# Patient Record
Sex: FEMALE | Race: WHITE | NOT HISPANIC OR LATINO | Employment: FULL TIME | URBAN - METROPOLITAN AREA
[De-identification: names, ages, dates, MRNs, and addresses within clinical notes are randomized per-mention and may not be internally consistent; named-entity substitution may affect disease eponyms.]

---

## 2020-09-11 ENCOUNTER — HOSPITAL ENCOUNTER (INPATIENT)
Facility: HOSPITAL | Age: 30
LOS: 3 days | Discharge: HOME | DRG: 639 | End: 2020-09-14
Attending: EMERGENCY MEDICINE | Admitting: INTERNAL MEDICINE
Payer: COMMERCIAL

## 2020-09-11 DIAGNOSIS — R73.9 HYPERGLYCEMIA: ICD-10-CM

## 2020-09-11 DIAGNOSIS — E10.65 UNCONTROLLED TYPE 1 DIABETES MELLITUS WITH HYPERGLYCEMIA: Primary | ICD-10-CM

## 2020-09-11 DIAGNOSIS — E11.10 DKA (DIABETIC KETOACIDOSES): ICD-10-CM

## 2020-09-11 LAB
ALBUMIN SERPL BCP-MCNC: 4.7 G/DL (ref 3.5–5.2)
ALLENS TEST: ABNORMAL
ALP SERPL-CCNC: 112 U/L (ref 55–135)
ALT SERPL W/O P-5'-P-CCNC: 14 U/L (ref 10–44)
ANION GAP SERPL CALC-SCNC: 23 MMOL/L (ref 8–16)
AST SERPL-CCNC: 12 U/L (ref 10–40)
B-HCG UR QL: NEGATIVE
B-OH-BUTYR BLD STRIP-SCNC: 6.8 MMOL/L (ref 0–0.5)
BACTERIA #/AREA URNS HPF: ABNORMAL /HPF
BASOPHILS # BLD AUTO: 0.03 K/UL (ref 0–0.2)
BASOPHILS NFR BLD: 0.4 % (ref 0–1.9)
BILIRUB SERPL-MCNC: 0.7 MG/DL (ref 0.1–1)
BILIRUB UR QL STRIP: NEGATIVE
BUN SERPL-MCNC: 8 MG/DL (ref 6–20)
CALCIUM SERPL-MCNC: 10.3 MG/DL (ref 8.7–10.5)
CHLORIDE SERPL-SCNC: 104 MMOL/L (ref 95–110)
CLARITY UR: CLEAR
CO2 SERPL-SCNC: 10 MMOL/L (ref 23–29)
COLOR UR: YELLOW
CREAT SERPL-MCNC: 1.2 MG/DL (ref 0.5–1.4)
CTP QC/QA: YES
DELSYS: ABNORMAL
DIFFERENTIAL METHOD: ABNORMAL
EOSINOPHIL # BLD AUTO: 0 K/UL (ref 0–0.5)
EOSINOPHIL NFR BLD: 0.1 % (ref 0–8)
ERYTHROCYTE [DISTWIDTH] IN BLOOD BY AUTOMATED COUNT: 13.9 % (ref 11.5–14.5)
EST. GFR  (AFRICAN AMERICAN): >60 ML/MIN/1.73 M^2
EST. GFR  (NON AFRICAN AMERICAN): 60 ML/MIN/1.73 M^2
GLUCOSE SERPL-MCNC: 330 MG/DL (ref 70–110)
GLUCOSE UR QL STRIP: ABNORMAL
HCO3 UR-SCNC: 9.3 MMOL/L (ref 24–28)
HCT VFR BLD AUTO: 48.4 % (ref 37–48.5)
HGB BLD-MCNC: 16.3 G/DL (ref 12–16)
HGB UR QL STRIP: ABNORMAL
HYALINE CASTS #/AREA URNS LPF: 0 /LPF
IMM GRANULOCYTES # BLD AUTO: 0.02 K/UL (ref 0–0.04)
IMM GRANULOCYTES NFR BLD AUTO: 0.3 % (ref 0–0.5)
KETONES UR QL STRIP: ABNORMAL
LEUKOCYTE ESTERASE UR QL STRIP: NEGATIVE
LYMPHOCYTES # BLD AUTO: 1.3 K/UL (ref 1–4.8)
LYMPHOCYTES NFR BLD: 17.3 % (ref 18–48)
MCH RBC QN AUTO: 30.2 PG (ref 27–31)
MCHC RBC AUTO-ENTMCNC: 33.7 G/DL (ref 32–36)
MCV RBC AUTO: 90 FL (ref 82–98)
MICROSCOPIC COMMENT: ABNORMAL
MONOCYTES # BLD AUTO: 0.6 K/UL (ref 0.3–1)
MONOCYTES NFR BLD: 7.5 % (ref 4–15)
NEUTROPHILS # BLD AUTO: 5.7 K/UL (ref 1.8–7.7)
NEUTROPHILS NFR BLD: 74.4 % (ref 38–73)
NITRITE UR QL STRIP: NEGATIVE
NRBC BLD-RTO: 0 /100 WBC
PCO2 BLDA: 23.5 MMHG (ref 35–45)
PH SMN: 7.21 [PH] (ref 7.35–7.45)
PH UR STRIP: 6 [PH] (ref 5–8)
PLATELET # BLD AUTO: 220 K/UL (ref 150–350)
PMV BLD AUTO: 11.2 FL (ref 9.2–12.9)
PO2 BLDA: 21 MMHG (ref 40–60)
POC BE: -19 MMOL/L
POC SATURATED O2: 26 % (ref 95–100)
POC TCO2: 10 MMOL/L (ref 24–29)
POCT GLUCOSE: 261 MG/DL (ref 70–110)
POCT GLUCOSE: 274 MG/DL (ref 70–110)
POCT GLUCOSE: 280 MG/DL (ref 70–110)
POTASSIUM SERPL-SCNC: 3.7 MMOL/L (ref 3.5–5.1)
POTASSIUM SERPL-SCNC: 4.1 MMOL/L (ref 3.5–5.1)
PROT SERPL-MCNC: 8 G/DL (ref 6–8.4)
PROT UR QL STRIP: ABNORMAL
RBC # BLD AUTO: 5.4 M/UL (ref 4–5.4)
RBC #/AREA URNS HPF: 2 /HPF (ref 0–4)
SAMPLE: ABNORMAL
SARS-COV-2 RDRP RESP QL NAA+PROBE: NEGATIVE
SITE: ABNORMAL
SODIUM SERPL-SCNC: 137 MMOL/L (ref 136–145)
SP GR UR STRIP: >=1.03 (ref 1–1.03)
SQUAMOUS #/AREA URNS HPF: 12 /HPF
URN SPEC COLLECT METH UR: ABNORMAL
UROBILINOGEN UR STRIP-ACNC: NEGATIVE EU/DL
WBC # BLD AUTO: 7.65 K/UL (ref 3.9–12.7)
WBC #/AREA URNS HPF: 1 /HPF (ref 0–5)
YEAST URNS QL MICRO: ABNORMAL

## 2020-09-11 PROCEDURE — 63600175 PHARM REV CODE 636 W HCPCS: Performed by: EMERGENCY MEDICINE

## 2020-09-11 PROCEDURE — 84132 ASSAY OF SERUM POTASSIUM: CPT

## 2020-09-11 PROCEDURE — 99291 CRITICAL CARE FIRST HOUR: CPT | Mod: 25

## 2020-09-11 PROCEDURE — 85025 COMPLETE CBC W/AUTO DIFF WBC: CPT

## 2020-09-11 PROCEDURE — 63600175 PHARM REV CODE 636 W HCPCS: Performed by: NURSE PRACTITIONER

## 2020-09-11 PROCEDURE — U0002 COVID-19 LAB TEST NON-CDC: HCPCS

## 2020-09-11 PROCEDURE — 94761 N-INVAS EAR/PLS OXIMETRY MLT: CPT

## 2020-09-11 PROCEDURE — 82803 BLOOD GASES ANY COMBINATION: CPT

## 2020-09-11 PROCEDURE — 96360 HYDRATION IV INFUSION INIT: CPT

## 2020-09-11 PROCEDURE — 25000003 PHARM REV CODE 250: Performed by: EMERGENCY MEDICINE

## 2020-09-11 PROCEDURE — 20000000 HC ICU ROOM

## 2020-09-11 PROCEDURE — 25000003 PHARM REV CODE 250: Performed by: NURSE PRACTITIONER

## 2020-09-11 PROCEDURE — 82010 KETONE BODYS QUAN: CPT

## 2020-09-11 PROCEDURE — 99900035 HC TECH TIME PER 15 MIN (STAT)

## 2020-09-11 PROCEDURE — 36415 COLL VENOUS BLD VENIPUNCTURE: CPT

## 2020-09-11 PROCEDURE — 81025 URINE PREGNANCY TEST: CPT | Performed by: EMERGENCY MEDICINE

## 2020-09-11 PROCEDURE — 93005 ELECTROCARDIOGRAM TRACING: CPT

## 2020-09-11 PROCEDURE — 93010 EKG 12-LEAD: ICD-10-PCS | Mod: ,,, | Performed by: INTERNAL MEDICINE

## 2020-09-11 PROCEDURE — 80053 COMPREHEN METABOLIC PANEL: CPT

## 2020-09-11 PROCEDURE — 93010 ELECTROCARDIOGRAM REPORT: CPT | Mod: ,,, | Performed by: INTERNAL MEDICINE

## 2020-09-11 PROCEDURE — 82962 GLUCOSE BLOOD TEST: CPT

## 2020-09-11 PROCEDURE — 81000 URINALYSIS NONAUTO W/SCOPE: CPT

## 2020-09-11 RX ORDER — DIPHENHYDRAMINE HYDROCHLORIDE 50 MG/ML
25 INJECTION INTRAMUSCULAR; INTRAVENOUS
Status: COMPLETED | OUTPATIENT
Start: 2020-09-11 | End: 2020-09-11

## 2020-09-11 RX ORDER — ENOXAPARIN SODIUM 100 MG/ML
40 INJECTION SUBCUTANEOUS EVERY 24 HOURS
Status: DISCONTINUED | OUTPATIENT
Start: 2020-09-11 | End: 2020-09-14 | Stop reason: HOSPADM

## 2020-09-11 RX ORDER — ACETAMINOPHEN 325 MG/1
650 TABLET ORAL EVERY 4 HOURS PRN
Status: DISCONTINUED | OUTPATIENT
Start: 2020-09-11 | End: 2020-09-14 | Stop reason: HOSPADM

## 2020-09-11 RX ORDER — FAMOTIDINE 20 MG/1
20 TABLET, FILM COATED ORAL 2 TIMES DAILY
Status: DISCONTINUED | OUTPATIENT
Start: 2020-09-11 | End: 2020-09-14 | Stop reason: HOSPADM

## 2020-09-11 RX ORDER — ONDANSETRON 2 MG/ML
8 INJECTION INTRAMUSCULAR; INTRAVENOUS
Status: COMPLETED | OUTPATIENT
Start: 2020-09-11 | End: 2020-09-11

## 2020-09-11 RX ORDER — TALC
6 POWDER (GRAM) TOPICAL NIGHTLY PRN
Status: DISCONTINUED | OUTPATIENT
Start: 2020-09-11 | End: 2020-09-14 | Stop reason: HOSPADM

## 2020-09-11 RX ORDER — SODIUM CHLORIDE 9 MG/ML
INJECTION, SOLUTION INTRAVENOUS CONTINUOUS
Status: DISCONTINUED | OUTPATIENT
Start: 2020-09-11 | End: 2020-09-12

## 2020-09-11 RX ORDER — ONDANSETRON 2 MG/ML
4 INJECTION INTRAMUSCULAR; INTRAVENOUS EVERY 6 HOURS PRN
Status: DISCONTINUED | OUTPATIENT
Start: 2020-09-11 | End: 2020-09-14 | Stop reason: HOSPADM

## 2020-09-11 RX ORDER — DEXTROSE MONOHYDRATE AND SODIUM CHLORIDE 5; .45 G/100ML; G/100ML
INJECTION, SOLUTION INTRAVENOUS CONTINUOUS PRN
Status: DISCONTINUED | OUTPATIENT
Start: 2020-09-11 | End: 2020-09-12

## 2020-09-11 RX ORDER — SODIUM CHLORIDE 0.9 % (FLUSH) 0.9 %
10 SYRINGE (ML) INJECTION
Status: DISCONTINUED | OUTPATIENT
Start: 2020-09-11 | End: 2020-09-14 | Stop reason: HOSPADM

## 2020-09-11 RX ADMIN — SODIUM CHLORIDE 1000 ML: 0.9 INJECTION, SOLUTION INTRAVENOUS at 08:09

## 2020-09-11 RX ADMIN — ONDANSETRON 8 MG: 2 INJECTION INTRAMUSCULAR; INTRAVENOUS at 09:09

## 2020-09-11 RX ADMIN — SODIUM CHLORIDE: 0.9 INJECTION, SOLUTION INTRAVENOUS at 10:09

## 2020-09-11 RX ADMIN — ENOXAPARIN SODIUM 40 MG: 40 INJECTION SUBCUTANEOUS at 11:09

## 2020-09-11 RX ADMIN — SODIUM CHLORIDE 4 UNITS/HR: 9 INJECTION, SOLUTION INTRAVENOUS at 09:09

## 2020-09-11 RX ADMIN — FAMOTIDINE 20 MG: 20 TABLET ORAL at 11:09

## 2020-09-11 RX ADMIN — DIPHENHYDRAMINE HYDROCHLORIDE 25 MG: 50 INJECTION INTRAMUSCULAR; INTRAVENOUS at 10:09

## 2020-09-12 LAB
ANION GAP SERPL CALC-SCNC: 12 MMOL/L (ref 8–16)
ANION GAP SERPL CALC-SCNC: 18 MMOL/L (ref 8–16)
ANION GAP SERPL CALC-SCNC: 8 MMOL/L (ref 8–16)
ANION GAP SERPL CALC-SCNC: 9 MMOL/L (ref 8–16)
ANION GAP SERPL CALC-SCNC: 9 MMOL/L (ref 8–16)
BASOPHILS # BLD AUTO: 0.02 K/UL (ref 0–0.2)
BASOPHILS NFR BLD: 0.3 % (ref 0–1.9)
BUN SERPL-MCNC: 5 MG/DL (ref 6–20)
BUN SERPL-MCNC: 7 MG/DL (ref 6–20)
BUN SERPL-MCNC: 7 MG/DL (ref 6–20)
CALCIUM SERPL-MCNC: 7.9 MG/DL (ref 8.7–10.5)
CALCIUM SERPL-MCNC: 8.1 MG/DL (ref 8.7–10.5)
CALCIUM SERPL-MCNC: 8.3 MG/DL (ref 8.7–10.5)
CALCIUM SERPL-MCNC: 8.3 MG/DL (ref 8.7–10.5)
CALCIUM SERPL-MCNC: 8.5 MG/DL (ref 8.7–10.5)
CHLORIDE SERPL-SCNC: 111 MMOL/L (ref 95–110)
CHLORIDE SERPL-SCNC: 113 MMOL/L (ref 95–110)
CHLORIDE SERPL-SCNC: 113 MMOL/L (ref 95–110)
CHLORIDE SERPL-SCNC: 114 MMOL/L (ref 95–110)
CHLORIDE SERPL-SCNC: 115 MMOL/L (ref 95–110)
CHOLEST SERPL-MCNC: 188 MG/DL (ref 120–199)
CHOLEST/HDLC SERPL: 5.7 {RATIO} (ref 2–5)
CO2 SERPL-SCNC: 10 MMOL/L (ref 23–29)
CO2 SERPL-SCNC: 13 MMOL/L (ref 23–29)
CO2 SERPL-SCNC: 15 MMOL/L (ref 23–29)
CO2 SERPL-SCNC: 16 MMOL/L (ref 23–29)
CO2 SERPL-SCNC: 9 MMOL/L (ref 23–29)
CREAT SERPL-MCNC: 0.8 MG/DL (ref 0.5–1.4)
CREAT SERPL-MCNC: 0.8 MG/DL (ref 0.5–1.4)
CREAT SERPL-MCNC: 0.9 MG/DL (ref 0.5–1.4)
DIFFERENTIAL METHOD: ABNORMAL
EOSINOPHIL # BLD AUTO: 0 K/UL (ref 0–0.5)
EOSINOPHIL NFR BLD: 0.1 % (ref 0–8)
ERYTHROCYTE [DISTWIDTH] IN BLOOD BY AUTOMATED COUNT: 14.1 % (ref 11.5–14.5)
EST. GFR  (AFRICAN AMERICAN): >60 ML/MIN/1.73 M^2
EST. GFR  (NON AFRICAN AMERICAN): >60 ML/MIN/1.73 M^2
ESTIMATED AVG GLUCOSE: 249 MG/DL (ref 68–131)
GLUCOSE SERPL-MCNC: 176 MG/DL (ref 70–110)
GLUCOSE SERPL-MCNC: 216 MG/DL (ref 70–110)
GLUCOSE SERPL-MCNC: 229 MG/DL (ref 70–110)
GLUCOSE SERPL-MCNC: 236 MG/DL (ref 70–110)
GLUCOSE SERPL-MCNC: 354 MG/DL (ref 70–110)
HBA1C MFR BLD HPLC: 10.3 % (ref 4–5.6)
HCT VFR BLD AUTO: 40.5 % (ref 37–48.5)
HDLC SERPL-MCNC: 33 MG/DL (ref 40–75)
HDLC SERPL: 17.6 % (ref 20–50)
HGB BLD-MCNC: 13.7 G/DL (ref 12–16)
IMM GRANULOCYTES # BLD AUTO: 0.02 K/UL (ref 0–0.04)
IMM GRANULOCYTES NFR BLD AUTO: 0.3 % (ref 0–0.5)
LACTATE SERPL-SCNC: 0.7 MMOL/L (ref 0.5–2.2)
LDLC SERPL CALC-MCNC: 134.4 MG/DL (ref 63–159)
LYMPHOCYTES # BLD AUTO: 1.3 K/UL (ref 1–4.8)
LYMPHOCYTES NFR BLD: 17.5 % (ref 18–48)
MAGNESIUM SERPL-MCNC: 1.9 MG/DL (ref 1.6–2.6)
MCH RBC QN AUTO: 29.7 PG (ref 27–31)
MCHC RBC AUTO-ENTMCNC: 33.8 G/DL (ref 32–36)
MCV RBC AUTO: 88 FL (ref 82–98)
MONOCYTES # BLD AUTO: 0.6 K/UL (ref 0.3–1)
MONOCYTES NFR BLD: 7.9 % (ref 4–15)
NEUTROPHILS # BLD AUTO: 5.5 K/UL (ref 1.8–7.7)
NEUTROPHILS NFR BLD: 73.9 % (ref 38–73)
NONHDLC SERPL-MCNC: 155 MG/DL
NRBC BLD-RTO: 0 /100 WBC
PHOSPHATE SERPL-MCNC: 1.1 MG/DL (ref 2.7–4.5)
PLATELET # BLD AUTO: 177 K/UL (ref 150–350)
PMV BLD AUTO: 11.4 FL (ref 9.2–12.9)
POCT GLUCOSE: 161 MG/DL (ref 70–110)
POCT GLUCOSE: 167 MG/DL (ref 70–110)
POCT GLUCOSE: 175 MG/DL (ref 70–110)
POCT GLUCOSE: 177 MG/DL (ref 70–110)
POCT GLUCOSE: 196 MG/DL (ref 70–110)
POCT GLUCOSE: 200 MG/DL (ref 70–110)
POCT GLUCOSE: 204 MG/DL (ref 70–110)
POCT GLUCOSE: 211 MG/DL (ref 70–110)
POCT GLUCOSE: 214 MG/DL (ref 70–110)
POCT GLUCOSE: 217 MG/DL (ref 70–110)
POCT GLUCOSE: 230 MG/DL (ref 70–110)
POCT GLUCOSE: 232 MG/DL (ref 70–110)
POCT GLUCOSE: 233 MG/DL (ref 70–110)
POCT GLUCOSE: 236 MG/DL (ref 70–110)
POCT GLUCOSE: 238 MG/DL (ref 70–110)
POCT GLUCOSE: 249 MG/DL (ref 70–110)
POCT GLUCOSE: 256 MG/DL (ref 70–110)
POCT GLUCOSE: 356 MG/DL (ref 70–110)
POTASSIUM SERPL-SCNC: 2.8 MMOL/L (ref 3.5–5.1)
POTASSIUM SERPL-SCNC: 3.1 MMOL/L (ref 3.5–5.1)
POTASSIUM SERPL-SCNC: 3.4 MMOL/L (ref 3.5–5.1)
POTASSIUM SERPL-SCNC: 3.4 MMOL/L (ref 3.5–5.1)
POTASSIUM SERPL-SCNC: 3.8 MMOL/L (ref 3.5–5.1)
RBC # BLD AUTO: 4.62 M/UL (ref 4–5.4)
SODIUM SERPL-SCNC: 136 MMOL/L (ref 136–145)
SODIUM SERPL-SCNC: 137 MMOL/L (ref 136–145)
SODIUM SERPL-SCNC: 140 MMOL/L (ref 136–145)
TRIGL SERPL-MCNC: 103 MG/DL (ref 30–150)
WBC # BLD AUTO: 7.49 K/UL (ref 3.9–12.7)

## 2020-09-12 PROCEDURE — 63600175 PHARM REV CODE 636 W HCPCS: Performed by: NURSE PRACTITIONER

## 2020-09-12 PROCEDURE — 83605 ASSAY OF LACTIC ACID: CPT

## 2020-09-12 PROCEDURE — 83735 ASSAY OF MAGNESIUM: CPT

## 2020-09-12 PROCEDURE — 84300 ASSAY OF URINE SODIUM: CPT

## 2020-09-12 PROCEDURE — C9399 UNCLASSIFIED DRUGS OR BIOLOG: HCPCS | Performed by: INTERNAL MEDICINE

## 2020-09-12 PROCEDURE — 81000 URINALYSIS NONAUTO W/SCOPE: CPT

## 2020-09-12 PROCEDURE — 82436 ASSAY OF URINE CHLORIDE: CPT

## 2020-09-12 PROCEDURE — 25000003 PHARM REV CODE 250: Performed by: NURSE PRACTITIONER

## 2020-09-12 PROCEDURE — 80048 BASIC METABOLIC PNL TOTAL CA: CPT | Mod: 91

## 2020-09-12 PROCEDURE — 94761 N-INVAS EAR/PLS OXIMETRY MLT: CPT

## 2020-09-12 PROCEDURE — 80061 LIPID PANEL: CPT

## 2020-09-12 PROCEDURE — 63600175 PHARM REV CODE 636 W HCPCS: Performed by: INTERNAL MEDICINE

## 2020-09-12 PROCEDURE — 85025 COMPLETE CBC W/AUTO DIFF WBC: CPT

## 2020-09-12 PROCEDURE — 25000003 PHARM REV CODE 250: Performed by: INTERNAL MEDICINE

## 2020-09-12 PROCEDURE — 84100 ASSAY OF PHOSPHORUS: CPT

## 2020-09-12 PROCEDURE — 80048 BASIC METABOLIC PNL TOTAL CA: CPT

## 2020-09-12 PROCEDURE — 36415 COLL VENOUS BLD VENIPUNCTURE: CPT

## 2020-09-12 PROCEDURE — 83036 HEMOGLOBIN GLYCOSYLATED A1C: CPT

## 2020-09-12 PROCEDURE — 20000000 HC ICU ROOM

## 2020-09-12 PROCEDURE — 84133 ASSAY OF URINE POTASSIUM: CPT

## 2020-09-12 PROCEDURE — S5010 5% DEXTROSE AND 0.45% SALINE: HCPCS | Performed by: NURSE PRACTITIONER

## 2020-09-12 RX ORDER — IBUPROFEN 200 MG
24 TABLET ORAL
Status: DISCONTINUED | OUTPATIENT
Start: 2020-09-12 | End: 2020-09-14 | Stop reason: HOSPADM

## 2020-09-12 RX ORDER — INSULIN ASPART 100 [IU]/ML
5 INJECTION, SOLUTION INTRAVENOUS; SUBCUTANEOUS
Status: DISCONTINUED | OUTPATIENT
Start: 2020-09-12 | End: 2020-09-13

## 2020-09-12 RX ORDER — POTASSIUM CHLORIDE 20 MEQ/1
40 TABLET, EXTENDED RELEASE ORAL ONCE
Status: COMPLETED | OUTPATIENT
Start: 2020-09-12 | End: 2020-09-12

## 2020-09-12 RX ORDER — POTASSIUM CHLORIDE 1500 MG/1
40 TABLET, EXTENDED RELEASE ORAL
Status: DISCONTINUED | OUTPATIENT
Start: 2020-09-12 | End: 2020-09-14 | Stop reason: SDUPTHER

## 2020-09-12 RX ORDER — IBUPROFEN 400 MG/1
400 TABLET ORAL EVERY 6 HOURS PRN
Status: DISCONTINUED | OUTPATIENT
Start: 2020-09-12 | End: 2020-09-14 | Stop reason: HOSPADM

## 2020-09-12 RX ORDER — GLUCAGON 1 MG
1 KIT INJECTION
Status: DISCONTINUED | OUTPATIENT
Start: 2020-09-12 | End: 2020-09-14 | Stop reason: HOSPADM

## 2020-09-12 RX ORDER — MAGNESIUM SULFATE HEPTAHYDRATE 40 MG/ML
4 INJECTION, SOLUTION INTRAVENOUS
Status: DISCONTINUED | OUTPATIENT
Start: 2020-09-12 | End: 2020-09-12

## 2020-09-12 RX ORDER — INSULIN ASPART 100 [IU]/ML
0-5 INJECTION, SOLUTION INTRAVENOUS; SUBCUTANEOUS
Status: DISCONTINUED | OUTPATIENT
Start: 2020-09-12 | End: 2020-09-14 | Stop reason: HOSPADM

## 2020-09-12 RX ORDER — POTASSIUM CHLORIDE 7.45 MG/ML
40 INJECTION INTRAVENOUS
Status: DISCONTINUED | OUTPATIENT
Start: 2020-09-12 | End: 2020-09-12

## 2020-09-12 RX ORDER — POTASSIUM CHLORIDE 1.5 G/1.58G
40 POWDER, FOR SOLUTION ORAL
Status: DISCONTINUED | OUTPATIENT
Start: 2020-09-12 | End: 2020-09-14

## 2020-09-12 RX ORDER — SODIUM,POTASSIUM PHOSPHATES 280-250MG
2 POWDER IN PACKET (EA) ORAL
Status: DISCONTINUED | OUTPATIENT
Start: 2020-09-12 | End: 2020-09-14

## 2020-09-12 RX ORDER — POTASSIUM CHLORIDE 1.5 G/1.58G
60 POWDER, FOR SOLUTION ORAL
Status: DISCONTINUED | OUTPATIENT
Start: 2020-09-12 | End: 2020-09-14

## 2020-09-12 RX ORDER — IBUPROFEN 200 MG
16 TABLET ORAL
Status: DISCONTINUED | OUTPATIENT
Start: 2020-09-12 | End: 2020-09-14 | Stop reason: HOSPADM

## 2020-09-12 RX ORDER — MAGNESIUM SULFATE HEPTAHYDRATE 40 MG/ML
2 INJECTION, SOLUTION INTRAVENOUS
Status: DISCONTINUED | OUTPATIENT
Start: 2020-09-12 | End: 2020-09-12

## 2020-09-12 RX ORDER — LANOLIN ALCOHOL/MO/W.PET/CERES
800 CREAM (GRAM) TOPICAL
Status: DISCONTINUED | OUTPATIENT
Start: 2020-09-12 | End: 2020-09-14 | Stop reason: HOSPADM

## 2020-09-12 RX ORDER — POTASSIUM CHLORIDE 1.5 G/1.58G
40 POWDER, FOR SOLUTION ORAL
Status: DISCONTINUED | OUTPATIENT
Start: 2020-09-12 | End: 2020-09-12 | Stop reason: SDUPTHER

## 2020-09-12 RX ORDER — POTASSIUM CHLORIDE 7.45 MG/ML
80 INJECTION INTRAVENOUS
Status: DISCONTINUED | OUTPATIENT
Start: 2020-09-12 | End: 2020-09-12

## 2020-09-12 RX ORDER — POTASSIUM CHLORIDE 7.45 MG/ML
60 INJECTION INTRAVENOUS
Status: DISCONTINUED | OUTPATIENT
Start: 2020-09-12 | End: 2020-09-12

## 2020-09-12 RX ADMIN — ACETAMINOPHEN 650 MG: 325 TABLET ORAL at 09:09

## 2020-09-12 RX ADMIN — INSULIN DETEMIR 15 UNITS: 100 INJECTION, SOLUTION SUBCUTANEOUS at 09:09

## 2020-09-12 RX ADMIN — DEXTROSE AND SODIUM CHLORIDE: 5; .45 INJECTION, SOLUTION INTRAVENOUS at 01:09

## 2020-09-12 RX ADMIN — POTASSIUM CHLORIDE 40 MEQ: 1500 TABLET, EXTENDED RELEASE ORAL at 05:09

## 2020-09-12 RX ADMIN — FAMOTIDINE 20 MG: 20 TABLET ORAL at 09:09

## 2020-09-12 RX ADMIN — POTASSIUM CHLORIDE 30 MEQ: 7.46 INJECTION, SOLUTION INTRAVENOUS at 01:09

## 2020-09-12 RX ADMIN — POTASSIUM CHLORIDE 40 MEQ: 1500 TABLET, EXTENDED RELEASE ORAL at 11:09

## 2020-09-12 RX ADMIN — INSULIN ASPART 5 UNITS: 100 INJECTION, SOLUTION INTRAVENOUS; SUBCUTANEOUS at 04:09

## 2020-09-12 RX ADMIN — INSULIN DETEMIR 5 UNITS: 100 INJECTION, SOLUTION SUBCUTANEOUS at 04:09

## 2020-09-12 RX ADMIN — DEXTROSE AND SODIUM CHLORIDE: 5; .45 INJECTION, SOLUTION INTRAVENOUS at 10:09

## 2020-09-12 RX ADMIN — ENOXAPARIN SODIUM 40 MG: 40 INJECTION SUBCUTANEOUS at 04:09

## 2020-09-12 RX ADMIN — POTASSIUM CHLORIDE 60 MEQ: 1.5 POWDER, FOR SOLUTION ORAL at 04:09

## 2020-09-12 RX ADMIN — IBUPROFEN 400 MG: 400 TABLET, FILM COATED ORAL at 12:09

## 2020-09-12 RX ADMIN — INSULIN ASPART 3 UNITS: 100 INJECTION, SOLUTION INTRAVENOUS; SUBCUTANEOUS at 09:09

## 2020-09-12 NOTE — ED PROVIDER NOTES
"Encounter Date: 9/11/2020    SCRIBE #1 NOTE: I, Angelicaeric Khan, am scribing for, and in the presence of, Delfin Fink MD.       History     Chief Complaint   Patient presents with    Hyperglycemia     Pt. reports 293 blood sugar, no hx. of diabetes    Shortness of Breath     2-3 days       Time seen by provider: 7:29 PM on 09/11/2020    Etta Bill is a 30 y.o. female who presents to the ED with complaints of SOB and weakness that started ~3-4 days ago. SOB worsens with walking or standing. Patient states initially being on a diet and assumed symptoms were secondary to "not eating enough". She reports increased thirst for the past few days. Today, she had a blood sugar of 293. She denies history of DM. Patient is a nonsmoker. She has seasonal allergies but denies daily medications. She reports onset of headache today that has since resolved. Patient denies urinary symptoms, N/V/D, menstrual cycle abnormalities, or other new symptoms. No PMHx. No PSHx.     The history is provided by the patient.     Review of patient's allergies indicates:  No Known Allergies  No past medical history on file.  No past surgical history on file.  No family history on file.  Social History     Tobacco Use    Smoking status: Not on file   Substance Use Topics    Alcohol use: Not on file    Drug use: Not on file     Review of Systems   Respiratory: Positive for shortness of breath.    Endocrine: Positive for polydipsia.   Neurological: Positive for weakness and headaches (resolved).   All other systems reviewed and are negative.      Physical Exam     Initial Vitals [09/11/20 1922]   BP Pulse Resp Temp SpO2   125/78 (!) 117 (!) 21 97.9 °F (36.6 °C) 100 %      MAP       --         Physical Exam    Nursing note and vitals reviewed.  Constitutional: She appears well-developed and well-nourished. She is not diaphoretic. No distress.   HENT:   Head: Normocephalic and atraumatic.   Eyes: EOM are normal.   Neck: Normal range of " motion. Neck supple.   Cardiovascular: Normal rate, regular rhythm and normal heart sounds. Exam reveals no gallop and no friction rub.    No murmur heard.  Pulmonary/Chest: Breath sounds normal. No respiratory distress. She has no wheezes. She has no rhonchi. She has no rales.   Abdominal: Soft. Bowel sounds are normal. There is no abdominal tenderness.   Musculoskeletal: Normal range of motion.   Neurological: She is alert and oriented to person, place, and time.   Skin: Skin is warm and dry.   Psychiatric: She has a normal mood and affect. Her behavior is normal. Judgment and thought content normal.         ED Course   Critical Care    Date/Time: 9/12/2020 12:47 AM  Performed by: Delfin Fink MD  Authorized by: Myrna Plaza MD   Direct patient critical care time: 38 minutes  Additional history critical care time: 6 minutes  Ordering / reviewing critical care time: 10 minutes  Documentation critical care time: 5 minutes  Consulting other physicians critical care time: 5 minutes  Consult with family critical care time: 5 minutes  Total critical care time (exclusive of procedural time) : 69 minutes  Critical care was necessary to treat or prevent imminent or life-threatening deterioration of the following conditions: endocrine crisis and metabolic crisis.  Critical care was time spent personally by me on the following activities: discussions with consultants, evaluation of patient's response to treatment, obtaining history from patient or surrogate, ordering and review of laboratory studies, pulse oximetry, review of old charts, examination of patient, ordering and performing treatments and interventions, ordering and review of radiographic studies and re-evaluation of patient's condition.        Labs Reviewed   CBC W/ AUTO DIFFERENTIAL - Abnormal; Notable for the following components:       Result Value    Hemoglobin 16.3 (*)     Gran% 74.4 (*)     Lymph% 17.3 (*)     All other components within normal  limits   POCT GLUCOSE - Abnormal; Notable for the following components:    POCT Glucose 280 (*)     All other components within normal limits   COMPREHENSIVE METABOLIC PANEL   BETA - HYDROXYBUTYRATE, SERUM   URINALYSIS, REFLEX TO URINE CULTURE   POCT URINE PREGNANCY   POCT GLUCOSE MONITORING CONTINUOUS          Imaging Results          X-Ray Chest AP Portable (In process)                  Medical Decision Making:   History:   Old Medical Records: I decided to obtain old medical records.  Independently Interpreted Test(s):   I have ordered and independently interpreted EKG Reading(s) - see prior notes  Clinical Tests:   Lab Tests: Reviewed and Ordered  Radiological Study: Reviewed and Ordered  Medical Tests: Reviewed and Ordered            Scribe Attestation:   Scribe #1: I performed the above scribed service and the documentation accurately describes the services I performed. I attest to the accuracy of the note.      I, Dr. Fink, personally performed the services described in this documentation. All medical record entries made by the scribe were at my direction and in my presence.  I have reviewed the chart and agree that the record reflects my personal performance and is accurate and complete.12:48 AM 09/12/2020              ED Course as of Sep 12 0047   Fri Sep 11, 2020   1928 SpO2: 100 % [EF]   1928 Resp(!): 21 [EF]   1928 Pulse(!): 117 [EF]   1928 Temp src: Oral [EF]   1928 Temp: 97.9 °F (36.6 °C) [EF]   1928 BP: 125/78 [EF]   2000 POC PH(!): 7.206 [EF]   2000 POC PCO2(!): 23.5 [EF]   2000 Beta-Hydroxybutyrate(!): 6.8 [EF]   2000 POC BE: -19 [EF]   2023 CO2(!): 10 [EF]   2023 Glucose(!): 330 [EF]   2032 Ashlee to admit for new onset DM and DKA    [EF]   2032 30-year-old female presents to the ER with malaise shortness of breath excessive thirst and urination.  Found to be in new onset diabetes with DKA.  Insulin drip started in the ER hospital Medicine will admit.  Well-appearing in the ER.   [EF]      ED  Course User Index  [EF] Delfin Fink MD            Clinical Impression:       ICD-10-CM ICD-9-CM   1. Hyperglycemia  R73.9 790.29   2. DKA (diabetic ketoacidoses)  E11.10 250.10                                  Delfin Fink MD  09/12/20 0048

## 2020-09-12 NOTE — CONSULTS
Food & Nutrition  Education    Diet Education: Carbohydrate Controlled Diet Education  Time Spent: 15 minutes  Learners: Patient      Nutrition Education provided with handouts:      Comments: 31 y/o female who presented to ED due to SOB and generalized weakness which began 3-4 days ago. Per pt, she was feeling ill for 2 days until her friend recommended her to have her blood sugar checked. Pt has never been dx with diabetes, however, her dad has DM per pt. Pt currently denies N/V/C/D or abdominal pain, and is beginning to have her appetite back. Pt states that she has been trying to lose weight in the past months and managed to lose 13 pounds, . Pt states her strategy for losing weight was by reducing the amount of carbs she ate.     Usual food intake goes as following:   Breakfast: eggs, cereal, coffee  Lunch: salad (lettuce, cheese,crutons)  Dinner: stew with carrots, beef or Jambalaya or beans     Talked to patient about how carbohydrates raise blood sugar and how insulin works to bring blood sugar to normal levels. Educated patient about macronutrient's and which foods had carbohydrates. Encourage patient to have a consistent amount of carbohydrates at each meal to prevent spikes in blood sugar. Encourage pt to check her blood sugar often and at what times. Encourage pt to meal prep to avoid skipping meals and to help her have readily healthy meals available. Encourage pt to keep weight loss a goal but to implement healthy ways of doing so to prevent complications such as mindful eating and implementing exercise if medically able to. Pt verbalized understanding. Unable to perform NFPE, RD charting remotely     All questions and concerns answered. Dietitian's contact information provided.       Follow-Up: Yes      Please Re-consult as needed        Thanks!      Jaimee Thompson RD

## 2020-09-12 NOTE — PLAN OF CARE
Arrived from ER per stretcher. Moved self from stretcher to bed. Alert and oriented and aware of diagnosis. Insulin drip infusing. Instructed to call nurse before getting OOB. C/O nausea/regurg and given emesis bag.

## 2020-09-12 NOTE — PLAN OF CARE
Patient remains in ICU, off insulin gtt since 1600, received 5 units of Levemir and and 5 units of Novolog before dinner. Replaced K+ per electrolyte sliding scale. Denies any nausea. Gave patient print outs on DKA and A1C's. Spouse at bedside, safety maintained.

## 2020-09-12 NOTE — HPI
Etta Bill is a 31 y/o female with no significant past medical history who presented to the ED with c/o SOB and generalized weakness which began 3-4 days ago.  Associated symptoms include polyuria and polydipsia.  States SOB worse with activity.  Pt states her friend checked pt's blood glucose with her accucheck machine and glucose was 293.  ED workup indicates that pt is in DKA. Denies hx diabetes or any previous similar episode.  She will be admitted to the service of hospital medicine and monitored closely in the ICU.

## 2020-09-12 NOTE — PROGRESS NOTES
[facility] Hospital Medicine  Progress Note    Patient Name: Etta Bill  MRN: [id number]  Patient Class: IP- Inpatient   Admission Date: 9/11/2020  Length of Stay: 1 days  Attending Physician: Myrna Plaza MD  Primary Care Provider: Primary Doctor No        Subjective:     Principal Problem:DKA (diabetic ketoacidoses)        HPI:  Etta Bill is a 29 y/o female with no significant past medical history who presented to the ED with c/o SOB and generalized weakness which began 3-4 days ago.  Associated symptoms include polyuria and polydipsia.  States SOB worse with activity.  Pt states her friend checked pt's blood glucose with her accucheck machine and glucose was 293.  ED workup indicates that pt is in DKA. Denies hx diabetes or any previous similar episode.  She will be admitted to the service of hospital medicine and monitored closely in the ICU.    Overview/Hospital Course:  No notes on file    Interval History:  Patient states that she feels much better anion gap closed.  Transitioned to scheduled insulin    Review of Systems   Constitutional: Negative for appetite change, chills, fatigue and fever.   HENT: Negative for congestion, hearing loss, rhinorrhea, sore throat, trouble swallowing and voice change.    Respiratory: Negative for cough, chest tightness, shortness of breath and wheezing.    Cardiovascular: Negative for chest pain, palpitations and leg swelling.   Gastrointestinal: Negative for abdominal pain, blood in stool, diarrhea, nausea and vomiting.   Genitourinary: Negative for difficulty urinating, frequency, hematuria and urgency.   Musculoskeletal: Negative for back pain, joint swelling and neck stiffness.   Skin: Negative for pallor and rash.   Neurological: Negative for tremors, seizures, syncope, speech difficulty, weakness, numbness and headaches.   Hematological: Negative for adenopathy.   Psychiatric/Behavioral: Negative for agitation, behavioral  problems, confusion and sleep disturbance.     Objective:     Vital Signs (Most Recent):  Temp: 98.1 °F (36.7 °C) (09/12/20 1111)  Pulse: 80 (09/12/20 1400)  Resp: (!) 22 (09/12/20 1400)  BP: 105/64 (09/12/20 1400)  SpO2: 100 % (09/12/20 1400) Vital Signs (24h Range):  Temp:  [97.7 °F (36.5 °C)-98.8 °F (37.1 °C)] 98.1 °F (36.7 °C)  Pulse:  [] 80  Resp:  [15-26] 22  SpO2:  [98 %-100 %] 100 %  BP: ()/(53-78) 105/64     Weight: 77.8 kg (171 lb 8.3 oz)  Body mass index is 32.41 kg/m².    Intake/Output Summary (Last 24 hours) at 9/12/2020 1532  Last data filed at 9/12/2020 1400  Gross per 24 hour   Intake 2524.24 ml   Output 2050 ml   Net 474.24 ml      Physical Exam  Constitutional:       General: She is not in acute distress.     Appearance: She is well-developed. She is not diaphoretic.   HENT:      Head: Normocephalic and atraumatic.      Right Ear: External ear normal.      Left Ear: External ear normal.      Nose: Nose normal.   Eyes:      General: No scleral icterus.        Right eye: No discharge.         Left eye: No discharge.      Conjunctiva/sclera: Conjunctivae normal.      Pupils: Pupils are equal, round, and reactive to light.   Neck:      Musculoskeletal: Normal range of motion and neck supple.      Thyroid: No thyromegaly.   Cardiovascular:      Rate and Rhythm: Normal rate and regular rhythm.      Heart sounds: Normal heart sounds. No murmur.   Pulmonary:      Effort: Pulmonary effort is normal. No respiratory distress.      Breath sounds: Normal breath sounds. No stridor. No wheezing or rales.   Abdominal:      General: Bowel sounds are normal. There is no distension.      Palpations: Abdomen is soft.      Tenderness: There is no abdominal tenderness.   Musculoskeletal:         General: No tenderness.   Lymphadenopathy:      Cervical: No cervical adenopathy.   Skin:     General: Skin is warm and dry.      Capillary Refill: Capillary refill takes less than 2 seconds.      Findings: No  erythema or rash.   Neurological:      Mental Status: She is alert and oriented to person, place, and time.      Cranial Nerves: No cranial nerve deficit.      Sensory: No sensory deficit.      Motor: No abnormal muscle tone.      Coordination: Coordination normal.   Psychiatric:         Behavior: Behavior normal.         Thought Content: Thought content normal.         Judgment: Judgment normal.         Significant Labs:   BMP:   Recent Labs   Lab 09/12/20  1010   *      K 3.8   *   CO2 15*   BUN 5*   CREATININE 0.9   CALCIUM 8.1*     CBC:   Recent Labs   Lab 09/11/20  1947 09/12/20  0402   WBC 7.65 7.49   HGB 16.3* 13.7   HCT 48.4 40.5    177       Significant Imaging: I have reviewed all pertinent imaging results/findings within the past 24 hours.      Assessment/Plan:      * DKA (diabetic ketoacidoses)  Appears to be new onset DM.  Anion gap closed but bicarb running low.  Patient may have concurrent non anion gap metabolic acidosis.  Or loss of bicarb mainly as a response to therapy  Will continue to monitor for now  Transition to scheduled insulin and will stop the insulin drip  Check hemoglobin A1c  Consult diabetic educator  Will need close outpatient follow up.          VTE Risk Mitigation (From admission, onward)         Ordered     enoxaparin injection 40 mg  Every 24 hours      09/11/20 2237     IP VTE HIGH RISK PATIENT  Once      09/11/20 2237     Place ROMELIA hose  Until discontinued      09/11/20 2237     Place sequential compression device  Until discontinued      09/11/20 2237                Discharge Planning   ADOLFO:      Code Status: Full Code   Is the patient medically ready for discharge?:     Reason for patient still in hospital (select all that apply): Treatment               Critical care time spent on the evaluation and treatment of severe organ dysfunction, review of pertinent labs and imaging studies, discussions with consulting providers and discussions with  patient/family: 60 minutes.      Myrna Plaza MD  Department of Hospital Medicine   [facility]

## 2020-09-12 NOTE — SUBJECTIVE & OBJECTIVE
Interval History:  Patient states that she feels much better anion gap closed.  Transitioned to scheduled insulin    Review of Systems   Constitutional: Negative for appetite change, chills, fatigue and fever.   HENT: Negative for congestion, hearing loss, rhinorrhea, sore throat, trouble swallowing and voice change.    Respiratory: Negative for cough, chest tightness, shortness of breath and wheezing.    Cardiovascular: Negative for chest pain, palpitations and leg swelling.   Gastrointestinal: Negative for abdominal pain, blood in stool, diarrhea, nausea and vomiting.   Genitourinary: Negative for difficulty urinating, frequency, hematuria and urgency.   Musculoskeletal: Negative for back pain, joint swelling and neck stiffness.   Skin: Negative for pallor and rash.   Neurological: Negative for tremors, seizures, syncope, speech difficulty, weakness, numbness and headaches.   Hematological: Negative for adenopathy.   Psychiatric/Behavioral: Negative for agitation, behavioral problems, confusion and sleep disturbance.     Objective:     Vital Signs (Most Recent):  Temp: 98.1 °F (36.7 °C) (09/12/20 1111)  Pulse: 80 (09/12/20 1400)  Resp: (!) 22 (09/12/20 1400)  BP: 105/64 (09/12/20 1400)  SpO2: 100 % (09/12/20 1400) Vital Signs (24h Range):  Temp:  [97.7 °F (36.5 °C)-98.8 °F (37.1 °C)] 98.1 °F (36.7 °C)  Pulse:  [] 80  Resp:  [15-26] 22  SpO2:  [98 %-100 %] 100 %  BP: ()/(53-78) 105/64     Weight: 77.8 kg (171 lb 8.3 oz)  Body mass index is 32.41 kg/m².    Intake/Output Summary (Last 24 hours) at 9/12/2020 1532  Last data filed at 9/12/2020 1400  Gross per 24 hour   Intake 2524.24 ml   Output 2050 ml   Net 474.24 ml      Physical Exam  Constitutional:       General: She is not in acute distress.     Appearance: She is well-developed. She is not diaphoretic.   HENT:      Head: Normocephalic and atraumatic.      Right Ear: External ear normal.      Left Ear: External ear normal.      Nose: Nose normal.    Eyes:      General: No scleral icterus.        Right eye: No discharge.         Left eye: No discharge.      Conjunctiva/sclera: Conjunctivae normal.      Pupils: Pupils are equal, round, and reactive to light.   Neck:      Musculoskeletal: Normal range of motion and neck supple.      Thyroid: No thyromegaly.   Cardiovascular:      Rate and Rhythm: Normal rate and regular rhythm.      Heart sounds: Normal heart sounds. No murmur.   Pulmonary:      Effort: Pulmonary effort is normal. No respiratory distress.      Breath sounds: Normal breath sounds. No stridor. No wheezing or rales.   Abdominal:      General: Bowel sounds are normal. There is no distension.      Palpations: Abdomen is soft.      Tenderness: There is no abdominal tenderness.   Musculoskeletal:         General: No tenderness.   Lymphadenopathy:      Cervical: No cervical adenopathy.   Skin:     General: Skin is warm and dry.      Capillary Refill: Capillary refill takes less than 2 seconds.      Findings: No erythema or rash.   Neurological:      Mental Status: She is alert and oriented to person, place, and time.      Cranial Nerves: No cranial nerve deficit.      Sensory: No sensory deficit.      Motor: No abnormal muscle tone.      Coordination: Coordination normal.   Psychiatric:         Behavior: Behavior normal.         Thought Content: Thought content normal.         Judgment: Judgment normal.         Significant Labs:   BMP:   Recent Labs   Lab 09/12/20  1010   *      K 3.8   *   CO2 15*   BUN 5*   CREATININE 0.9   CALCIUM 8.1*     CBC:   Recent Labs   Lab 09/11/20  1947 09/12/20  0402   WBC 7.65 7.49   HGB 16.3* 13.7   HCT 48.4 40.5    177       Significant Imaging: I have reviewed all pertinent imaging results/findings within the past 24 hours.

## 2020-09-12 NOTE — ASSESSMENT & PLAN NOTE
Appears to be new onset DM.  Admit to ICU  Initiate Insulin gtt - follow protocol - once DKA resolved can transition to basal bolus regimen.  IV hydration  Trend CMP and eletrolytes  Check hemoglobin A1c  Consult diabetic educator  Will need close outpatient follow up.

## 2020-09-12 NOTE — ASSESSMENT & PLAN NOTE
Appears to be new onset DM.  Anion gap closed but bicarb running low.  Patient may have concurrent non anion gap metabolic acidosis.  Or loss of bicarb mainly as a response to therapy  Will continue to monitor for now  Transition to scheduled insulin and will stop the insulin drip  Check hemoglobin A1c  Consult diabetic educator  Will need close outpatient follow up.

## 2020-09-12 NOTE — H&P
[facility] Medicine  History & Physical    Patient Name: Etta Bill  MRN: [id number]  Admission Date: 9/11/2020  Attending Physician: Myrna Plaza MD   Primary Care Provider: Primary Doctor No         Patient information was obtained from patient, parent and ER records.     Subjective:     Principal Problem:DKA (diabetic ketoacidoses)    Chief Complaint:   Chief Complaint   Patient presents with    Hyperglycemia     Pt. reports 293 blood sugar, no hx. of diabetes    Shortness of Breath     2-3 days        HPI: Etta Bill is a 29 y/o female with no significant past medical history who presented to the ED with c/o SOB and generalized weakness which began 3-4 days ago.  Associated symptoms include polyuria and polydipsia.  States SOB worse with activity.  Pt states her friend checked pt's blood glucose with her accucheck machine and glucose was 293.  ED workup indicates that pt is in DKA. Denies hx diabetes or any previous similar episode.  She will be admitted to the service of hospital medicine and monitored closely in the ICU.    History reviewed. No pertinent past medical history.    History reviewed. No pertinent surgical history.    Review of patient's allergies indicates:  No Known Allergies    No current facility-administered medications on file prior to encounter.      No current outpatient medications on file prior to encounter.     Family History     Problem Relation (Age of Onset)    Diabetes Cousin        Tobacco Use    Smoking status: Never Smoker    Smokeless tobacco: Never Used   Substance and Sexual Activity    Alcohol use: Yes     Comment: on occasion    Drug use: Never    Sexual activity: Not on file     Review of Systems   Constitutional: Positive for fatigue. Negative for chills and fever.   HENT: Negative for congestion and sore throat.    Eyes: Negative for photophobia and visual disturbance.   Respiratory: Positive for shortness of  breath. Negative for cough.    Cardiovascular: Negative for chest pain and palpitations.   Gastrointestinal: Negative for abdominal pain and diarrhea.   Endocrine: Positive for polydipsia and polyuria.   Genitourinary: Negative for dysuria and flank pain.   Musculoskeletal: Negative for arthralgias and back pain.   Skin: Negative for rash and wound.   Neurological: Positive for weakness and headaches. Negative for dizziness.   Hematological: Negative for adenopathy.   Psychiatric/Behavioral: Negative for agitation and confusion.   All other systems reviewed and are negative.    Objective:     Vital Signs (Most Recent):  Temp: 98.7 °F (37.1 °C) (09/11/20 2240)  Pulse: 97 (09/11/20 2240)  Resp: (!) 25 (09/11/20 2240)  BP: 119/70 (09/11/20 2240)  SpO2: 100 % (09/11/20 2240) Vital Signs (24h Range):  Temp:  [97.9 °F (36.6 °C)-98.7 °F (37.1 °C)] 98.7 °F (37.1 °C)  Pulse:  [] 97  Resp:  [21-25] 25  SpO2:  [100 %] 100 %  BP: (119-125)/(70-78) 119/70     Weight: 77.8 kg (171 lb 8.3 oz)  Body mass index is 32.41 kg/m².    Physical Exam  Vitals signs and nursing note reviewed.   Constitutional:       General: She is not in acute distress.     Appearance: She is well-developed. She is ill-appearing.   HENT:      Head: Normocephalic and atraumatic.      Mouth/Throat:      Mouth: Mucous membranes are dry.   Eyes:      Conjunctiva/sclera: Conjunctivae normal.      Pupils: Pupils are equal, round, and reactive to light.   Neck:      Musculoskeletal: Normal range of motion and neck supple.   Cardiovascular:      Rate and Rhythm: Regular rhythm. Tachycardia present.      Pulses: Normal pulses.      Heart sounds: Normal heart sounds.   Pulmonary:      Effort: Pulmonary effort is normal. Tachypnea present. No respiratory distress.      Breath sounds: Normal breath sounds.   Abdominal:      General: Bowel sounds are normal. There is no distension.      Palpations: Abdomen is soft.      Tenderness: There is no abdominal  tenderness.   Musculoskeletal: Normal range of motion.         General: No swelling.   Skin:     General: Skin is warm and dry.      Capillary Refill: Capillary refill takes less than 2 seconds.      Findings: No rash.   Neurological:      General: No focal deficit present.      Mental Status: She is alert and oriented to person, place, and time.   Psychiatric:         Mood and Affect: Mood is anxious.         Behavior: Behavior normal.         Thought Content: Thought content normal.         Judgment: Judgment normal.           CRANIAL NERVES     CN III, IV, VI   Pupils are equal, round, and reactive to light.       Significant Labs:   CBC:   Recent Labs   Lab 09/11/20 1947   WBC 7.65   HGB 16.3*   HCT 48.4        CMP:   Recent Labs   Lab 09/11/20 1947 09/11/20 2040     --    K 3.7 4.1     --    CO2 10*  --    *  --    BUN 8  --    CREATININE 1.2  --    CALCIUM 10.3  --    PROT 8.0  --    ALBUMIN 4.7  --    BILITOT 0.7  --    ALKPHOS 112  --    AST 12  --    ALT 14  --    ANIONGAP 23*  --    EGFRNONAA 60  --      Urine Studies:   Recent Labs   Lab 09/11/20 1956   COLORU Yellow   APPEARANCEUA Clear   PHUR 6.0   SPECGRAV >=1.030*   PROTEINUA 1+*   GLUCUA 3+*   KETONESU 3+*   BILIRUBINUA Negative   OCCULTUA 1+*   NITRITE Negative   UROBILINOGEN Negative   LEUKOCYTESUR Negative   RBCUA 2   WBCUA 1   BACTERIA Many*   SQUAMEPITHEL 12   HYALINECASTS 0       Significant Imaging: CXR: No acute cardiopulmonary abnormality.       Assessment/Plan:     * DKA (diabetic ketoacidoses)  Appears to be new onset DM.  Admit to ICU  Initiate Insulin gtt - follow protocol - once DKA resolved can transition to basal bolus regimen.  IV hydration  Trend CMP and eletrolytes  Check hemoglobin A1c  Consult diabetic educator  Will need close outpatient follow up.          VTE Risk Mitigation (From admission, onward)         Ordered     enoxaparin injection 40 mg  Every 24 hours      09/11/20 2237     IP VTE HIGH  RISK PATIENT  Once      09/11/20 2237     Place ROMELIA hose  Until discontinued      09/11/20 2237     Place sequential compression device  Until discontinued      09/11/20 2237              Critical care time spent on the evaluation and treatment of severe organ dysfunction, review of pertinent labs and imaging studies, discussions with consulting providers and discussions with patient/family: 45 minutes.     CAMMIE Griffith  Department of Hospital Medicine   [facility]

## 2020-09-12 NOTE — SUBJECTIVE & OBJECTIVE
History reviewed. No pertinent past medical history.    History reviewed. No pertinent surgical history.    Review of patient's allergies indicates:  No Known Allergies    No current facility-administered medications on file prior to encounter.      No current outpatient medications on file prior to encounter.     Family History     Problem Relation (Age of Onset)    Diabetes Cousin        Tobacco Use    Smoking status: Never Smoker    Smokeless tobacco: Never Used   Substance and Sexual Activity    Alcohol use: Yes     Comment: on occasion    Drug use: Never    Sexual activity: Not on file     Review of Systems   Constitutional: Positive for fatigue. Negative for chills and fever.   HENT: Negative for congestion and sore throat.    Eyes: Negative for photophobia and visual disturbance.   Respiratory: Positive for shortness of breath. Negative for cough.    Cardiovascular: Negative for chest pain and palpitations.   Gastrointestinal: Negative for abdominal pain and diarrhea.   Endocrine: Positive for polydipsia and polyuria.   Genitourinary: Negative for dysuria and flank pain.   Musculoskeletal: Negative for arthralgias and back pain.   Skin: Negative for rash and wound.   Neurological: Positive for weakness and headaches. Negative for dizziness.   Hematological: Negative for adenopathy.   Psychiatric/Behavioral: Negative for agitation and confusion.   All other systems reviewed and are negative.    Objective:     Vital Signs (Most Recent):  Temp: 98.7 °F (37.1 °C) (09/11/20 2240)  Pulse: 97 (09/11/20 2240)  Resp: (!) 25 (09/11/20 2240)  BP: 119/70 (09/11/20 2240)  SpO2: 100 % (09/11/20 2240) Vital Signs (24h Range):  Temp:  [97.9 °F (36.6 °C)-98.7 °F (37.1 °C)] 98.7 °F (37.1 °C)  Pulse:  [] 97  Resp:  [21-25] 25  SpO2:  [100 %] 100 %  BP: (119-125)/(70-78) 119/70     Weight: 77.8 kg (171 lb 8.3 oz)  Body mass index is 32.41 kg/m².    Physical Exam  Vitals signs and nursing note reviewed.    Constitutional:       General: She is not in acute distress.     Appearance: She is well-developed. She is ill-appearing.   HENT:      Head: Normocephalic and atraumatic.      Mouth/Throat:      Mouth: Mucous membranes are dry.   Eyes:      Conjunctiva/sclera: Conjunctivae normal.      Pupils: Pupils are equal, round, and reactive to light.   Neck:      Musculoskeletal: Normal range of motion and neck supple.   Cardiovascular:      Rate and Rhythm: Regular rhythm. Tachycardia present.      Pulses: Normal pulses.      Heart sounds: Normal heart sounds.   Pulmonary:      Effort: Pulmonary effort is normal. Tachypnea present. No respiratory distress.      Breath sounds: Normal breath sounds.   Abdominal:      General: Bowel sounds are normal. There is no distension.      Palpations: Abdomen is soft.      Tenderness: There is no abdominal tenderness.   Musculoskeletal: Normal range of motion.         General: No swelling.   Skin:     General: Skin is warm and dry.      Capillary Refill: Capillary refill takes less than 2 seconds.      Findings: No rash.   Neurological:      General: No focal deficit present.      Mental Status: She is alert and oriented to person, place, and time.   Psychiatric:         Mood and Affect: Mood is anxious.         Behavior: Behavior normal.         Thought Content: Thought content normal.         Judgment: Judgment normal.           CRANIAL NERVES     CN III, IV, VI   Pupils are equal, round, and reactive to light.       Significant Labs:   CBC:   Recent Labs   Lab 09/11/20 1947   WBC 7.65   HGB 16.3*   HCT 48.4        CMP:   Recent Labs   Lab 09/11/20 1947 09/11/20  2040     --    K 3.7 4.1     --    CO2 10*  --    *  --    BUN 8  --    CREATININE 1.2  --    CALCIUM 10.3  --    PROT 8.0  --    ALBUMIN 4.7  --    BILITOT 0.7  --    ALKPHOS 112  --    AST 12  --    ALT 14  --    ANIONGAP 23*  --    EGFRNONAA 60  --      Urine Studies:   Recent Labs   Lab  09/11/20 1956   COLORU Yellow   APPEARANCEUA Clear   PHUR 6.0   SPECGRAV >=1.030*   PROTEINUA 1+*   GLUCUA 3+*   KETONESU 3+*   BILIRUBINUA Negative   OCCULTUA 1+*   NITRITE Negative   UROBILINOGEN Negative   LEUKOCYTESUR Negative   RBCUA 2   WBCUA 1   BACTERIA Many*   SQUAMEPITHEL 12   HYALINECASTS 0       Significant Imaging: CXR: No acute cardiopulmonary abnormality.

## 2020-09-12 NOTE — PLAN OF CARE
Insulin drip at 3U/hr. Gap decreasing. Voiding well per bedside toilet. Received KCL coverage. Safety maintained.

## 2020-09-12 NOTE — SIGNIFICANT EVENT
Results for MELISA GEORGES (MRN [id number]) as of 9/12/2020 02:42   Ref. Range 9/12/2020 00:23   CO2 Latest Ref Range: 23 - 29 mmol/L 9 (LL)   Mundo NP notified.

## 2020-09-13 PROBLEM — E83.39 HYPOPHOSPHATEMIA: Status: ACTIVE | Noted: 2020-09-13

## 2020-09-13 PROBLEM — E87.6 HYPOKALEMIA: Status: ACTIVE | Noted: 2020-09-13

## 2020-09-13 PROBLEM — R73.9 HYPERGLYCEMIA: Status: ACTIVE | Noted: 2020-09-13

## 2020-09-13 LAB
ANION GAP SERPL CALC-SCNC: 10 MMOL/L (ref 8–16)
ANION GAP SERPL CALC-SCNC: 13 MMOL/L (ref 8–16)
BACTERIA #/AREA URNS HPF: ABNORMAL /HPF
BASOPHILS # BLD AUTO: 0.02 K/UL (ref 0–0.2)
BASOPHILS NFR BLD: 0.4 % (ref 0–1.9)
BILIRUB UR QL STRIP: NEGATIVE
BUN SERPL-MCNC: 4 MG/DL (ref 6–20)
BUN SERPL-MCNC: 4 MG/DL (ref 6–20)
CALCIUM SERPL-MCNC: 8.6 MG/DL (ref 8.7–10.5)
CALCIUM SERPL-MCNC: 9 MG/DL (ref 8.7–10.5)
CHLORIDE SERPL-SCNC: 106 MMOL/L (ref 95–110)
CHLORIDE SERPL-SCNC: 111 MMOL/L (ref 95–110)
CHLORIDE UR-SCNC: 133 MMOL/L (ref 25–200)
CLARITY UR: CLEAR
CO2 SERPL-SCNC: 16 MMOL/L (ref 23–29)
CO2 SERPL-SCNC: 23 MMOL/L (ref 23–29)
COLOR UR: YELLOW
CREAT SERPL-MCNC: 0.8 MG/DL (ref 0.5–1.4)
CREAT SERPL-MCNC: 0.8 MG/DL (ref 0.5–1.4)
DIFFERENTIAL METHOD: NORMAL
EOSINOPHIL # BLD AUTO: 0.1 K/UL (ref 0–0.5)
EOSINOPHIL NFR BLD: 2.5 % (ref 0–8)
ERYTHROCYTE [DISTWIDTH] IN BLOOD BY AUTOMATED COUNT: 14.3 % (ref 11.5–14.5)
EST. GFR  (AFRICAN AMERICAN): >60 ML/MIN/1.73 M^2
EST. GFR  (AFRICAN AMERICAN): >60 ML/MIN/1.73 M^2
EST. GFR  (NON AFRICAN AMERICAN): >60 ML/MIN/1.73 M^2
EST. GFR  (NON AFRICAN AMERICAN): >60 ML/MIN/1.73 M^2
GLUCOSE SERPL-MCNC: 225 MG/DL (ref 70–110)
GLUCOSE SERPL-MCNC: 350 MG/DL (ref 70–110)
GLUCOSE UR QL STRIP: ABNORMAL
HCT VFR BLD AUTO: 38.6 % (ref 37–48.5)
HGB BLD-MCNC: 13.2 G/DL (ref 12–16)
HGB UR QL STRIP: NEGATIVE
IMM GRANULOCYTES # BLD AUTO: 0.01 K/UL (ref 0–0.04)
IMM GRANULOCYTES NFR BLD AUTO: 0.2 % (ref 0–0.5)
KETONES UR QL STRIP: ABNORMAL
LEUKOCYTE ESTERASE UR QL STRIP: NEGATIVE
LYMPHOCYTES # BLD AUTO: 2 K/UL (ref 1–4.8)
LYMPHOCYTES NFR BLD: 39.3 % (ref 18–48)
MAGNESIUM SERPL-MCNC: 1.8 MG/DL (ref 1.6–2.6)
MCH RBC QN AUTO: 29.6 PG (ref 27–31)
MCHC RBC AUTO-ENTMCNC: 34.2 G/DL (ref 32–36)
MCV RBC AUTO: 87 FL (ref 82–98)
MICROSCOPIC COMMENT: ABNORMAL
MONOCYTES # BLD AUTO: 0.5 K/UL (ref 0.3–1)
MONOCYTES NFR BLD: 9 % (ref 4–15)
NEUTROPHILS # BLD AUTO: 2.5 K/UL (ref 1.8–7.7)
NEUTROPHILS NFR BLD: 48.6 % (ref 38–73)
NITRITE UR QL STRIP: NEGATIVE
NRBC BLD-RTO: 0 /100 WBC
PH UR STRIP: 6 [PH] (ref 5–8)
PHOSPHATE SERPL-MCNC: 1.2 MG/DL (ref 2.7–4.5)
PLATELET # BLD AUTO: 164 K/UL (ref 150–350)
PMV BLD AUTO: 11.2 FL (ref 9.2–12.9)
POCT GLUCOSE: 216 MG/DL (ref 70–110)
POCT GLUCOSE: 240 MG/DL (ref 70–110)
POCT GLUCOSE: 270 MG/DL (ref 70–110)
POCT GLUCOSE: 309 MG/DL (ref 70–110)
POCT GLUCOSE: 312 MG/DL (ref 70–110)
POTASSIUM SERPL-SCNC: 3.3 MMOL/L (ref 3.5–5.1)
POTASSIUM SERPL-SCNC: 3.6 MMOL/L (ref 3.5–5.1)
POTASSIUM UR-SCNC: 33 MMOL/L (ref 15–95)
PROT UR QL STRIP: NEGATIVE
RBC # BLD AUTO: 4.46 M/UL (ref 4–5.4)
RBC #/AREA URNS HPF: 0 /HPF (ref 0–4)
SODIUM SERPL-SCNC: 139 MMOL/L (ref 136–145)
SODIUM SERPL-SCNC: 140 MMOL/L (ref 136–145)
SODIUM UR-SCNC: 52 MMOL/L (ref 20–250)
SP GR UR STRIP: 1.02 (ref 1–1.03)
SQUAMOUS #/AREA URNS HPF: 4 /HPF
URN SPEC COLLECT METH UR: ABNORMAL
UROBILINOGEN UR STRIP-ACNC: NEGATIVE EU/DL
WBC # BLD AUTO: 5.11 K/UL (ref 3.9–12.7)
WBC #/AREA URNS HPF: 2 /HPF (ref 0–5)
YEAST URNS QL MICRO: ABNORMAL

## 2020-09-13 PROCEDURE — 25000003 PHARM REV CODE 250: Performed by: NURSE PRACTITIONER

## 2020-09-13 PROCEDURE — 25000003 PHARM REV CODE 250: Performed by: INTERNAL MEDICINE

## 2020-09-13 PROCEDURE — 85025 COMPLETE CBC W/AUTO DIFF WBC: CPT

## 2020-09-13 PROCEDURE — 36415 COLL VENOUS BLD VENIPUNCTURE: CPT

## 2020-09-13 PROCEDURE — 94761 N-INVAS EAR/PLS OXIMETRY MLT: CPT

## 2020-09-13 PROCEDURE — 83735 ASSAY OF MAGNESIUM: CPT

## 2020-09-13 PROCEDURE — 80048 BASIC METABOLIC PNL TOTAL CA: CPT

## 2020-09-13 PROCEDURE — 63600175 PHARM REV CODE 636 W HCPCS: Performed by: NURSE PRACTITIONER

## 2020-09-13 PROCEDURE — 20000000 HC ICU ROOM

## 2020-09-13 PROCEDURE — 84100 ASSAY OF PHOSPHORUS: CPT

## 2020-09-13 RX ORDER — POTASSIUM CHLORIDE 7.45 MG/ML
80 INJECTION INTRAVENOUS
Status: DISCONTINUED | OUTPATIENT
Start: 2020-09-13 | End: 2020-09-14

## 2020-09-13 RX ORDER — POTASSIUM CHLORIDE 7.45 MG/ML
40 INJECTION INTRAVENOUS
Status: DISCONTINUED | OUTPATIENT
Start: 2020-09-13 | End: 2020-09-14

## 2020-09-13 RX ORDER — INSULIN ASPART 100 [IU]/ML
6 INJECTION, SOLUTION INTRAVENOUS; SUBCUTANEOUS
Status: DISCONTINUED | OUTPATIENT
Start: 2020-09-14 | End: 2020-09-14 | Stop reason: HOSPADM

## 2020-09-13 RX ORDER — MAGNESIUM SULFATE HEPTAHYDRATE 40 MG/ML
2 INJECTION, SOLUTION INTRAVENOUS
Status: DISCONTINUED | OUTPATIENT
Start: 2020-09-13 | End: 2020-09-14

## 2020-09-13 RX ORDER — POTASSIUM CHLORIDE 7.45 MG/ML
60 INJECTION INTRAVENOUS
Status: DISCONTINUED | OUTPATIENT
Start: 2020-09-13 | End: 2020-09-14

## 2020-09-13 RX ORDER — MAGNESIUM SULFATE HEPTAHYDRATE 40 MG/ML
4 INJECTION, SOLUTION INTRAVENOUS
Status: DISCONTINUED | OUTPATIENT
Start: 2020-09-13 | End: 2020-09-14

## 2020-09-13 RX ADMIN — FAMOTIDINE 20 MG: 20 TABLET ORAL at 09:09

## 2020-09-13 RX ADMIN — POTASSIUM CHLORIDE 40 MEQ: 1500 TABLET, EXTENDED RELEASE ORAL at 01:09

## 2020-09-13 RX ADMIN — SODIUM PHOSPHATE, MONOBASIC, MONOHYDRATE 30 MMOL: 276; 142 INJECTION, SOLUTION INTRAVENOUS at 11:09

## 2020-09-13 RX ADMIN — INSULIN ASPART 5 UNITS: 100 INJECTION, SOLUTION INTRAVENOUS; SUBCUTANEOUS at 04:09

## 2020-09-13 RX ADMIN — INSULIN ASPART 5 UNITS: 100 INJECTION, SOLUTION INTRAVENOUS; SUBCUTANEOUS at 11:09

## 2020-09-13 RX ADMIN — INSULIN ASPART 5 UNITS: 100 INJECTION, SOLUTION INTRAVENOUS; SUBCUTANEOUS at 07:09

## 2020-09-13 RX ADMIN — POTASSIUM CHLORIDE 40 MEQ: 1500 TABLET, EXTENDED RELEASE ORAL at 09:09

## 2020-09-13 RX ADMIN — INSULIN ASPART 2 UNITS: 100 INJECTION, SOLUTION INTRAVENOUS; SUBCUTANEOUS at 09:09

## 2020-09-13 RX ADMIN — INSULIN ASPART 2 UNITS: 100 INJECTION, SOLUTION INTRAVENOUS; SUBCUTANEOUS at 11:09

## 2020-09-13 RX ADMIN — Medication 800 MG: at 09:09

## 2020-09-13 RX ADMIN — INSULIN ASPART 4 UNITS: 100 INJECTION, SOLUTION INTRAVENOUS; SUBCUTANEOUS at 04:09

## 2020-09-13 RX ADMIN — IBUPROFEN 400 MG: 400 TABLET, FILM COATED ORAL at 09:09

## 2020-09-13 RX ADMIN — ENOXAPARIN SODIUM 40 MG: 40 INJECTION SUBCUTANEOUS at 05:09

## 2020-09-13 RX ADMIN — Medication 800 MG: at 01:09

## 2020-09-13 RX ADMIN — FAMOTIDINE 20 MG: 20 TABLET ORAL at 08:09

## 2020-09-13 RX ADMIN — INSULIN ASPART 2 UNITS: 100 INJECTION, SOLUTION INTRAVENOUS; SUBCUTANEOUS at 07:09

## 2020-09-13 NOTE — PLAN OF CARE
Patient remains in ICU with transfer orders to the floor. Vital signs stable. Blood sugars currently not controlled. Notified Dr. Plaza of blood glucose 309 before dinner. Levemir increased from 15 to 20 units and Novolog with meals increased from 5 to 6 units.  at bedside most of day. Safety maintained.

## 2020-09-13 NOTE — PLAN OF CARE
Pt w/o significant complaints this shift;  able to ambulate independently with standby assistance [place] [facility]; CBG elevated to 356 after dinner but decreased to 270 with recheck; initiated Levemir; pt educated on short vs long acting insulins and their therapeutic actions. Pt able to demonstrate self insulin injection; Re-emphasized the importance of clean injection technique, site rotation and priming insulin pen prior to injection; verbalized understanding; all questions answered; safety maintained. GARCIA COLON RN 09/13/2020 6:08 AM

## 2020-09-13 NOTE — PLAN OF CARE
SW spoke with patient and  to complete a discharge planning assessment. Patient and Caregiver presents as alert and oriented x 4, pleasant, good eye contact, well groomed, recall good, concentration/judgement good, average intelligence, calm, communicative, cooperative, and asking and answering questions appropriately. SW verified all information on demographic sheet is correct. Patient reports living with  and minor daughter  and that she is independent with ADLs at this time and does drive. Patient reports  will transport pt home.    Patient reports does not have home health. Patient reports that she is not receiving outside resources. Patient reports having no medical equipment. Patient reports Cindy Barthelemy, NP as pt's PCP and has [employer] as their insurance. Patient reports receiving medications from "[facility] in [place], [place] and reports that she is able to afford medications at this time and at time of discharge. Patient reports that she is not on dialysis at this time. Patient reports that she is not receiving services with the coumadin clinic. Patient reports has not been admitted to the hospital within the last 30 days.    Patient reports does not have a Living Will or Healthcare Power of . Patient indicates mental health issues. Pt reports anxiety and depression.    Patient and Caregiver expressed no other needs at this time. SW remains available.       09/12/20 1105   Discharge Assessment   Assessment Type Discharge Planning Assessment   Confirmed/corrected address and phone number on facesheet? Yes   Assessment information obtained from? Patient;Caregiver   Prior to hospitilization cognitive status: Alert/Oriented   Prior to hospitalization functional status: Independent   Current cognitive status: Alert/Oriented   Current Functional Status: Independent   Lives With child(renuka), dependent;spouse   Able to Return to Prior Arrangements yes   Is patient able to care for  self after discharge? Yes   Who are your caregiver(s) and their phone number(s)? Yanira Stratton (mother) [phone]   Patient's perception of discharge disposition home or selfcare   Readmission Within the Last 30 Days no previous admission in last 30 days   Patient currently being followed by outpatient case management? No   Patient currently receives any other outside agency services? No   Equipment Currently Used at Home none   Part D Coverage Pt has private insurance   Do you have any problems affording any of your prescribed medications? No   Is the patient taking medications as prescribed? yes   Does the patient have transportation home? Yes   Transportation Anticipated car, drives self;family or friend will provide   Dialysis Name and Scheduled days N/A   Does the patient receive services at the Coumadin Clinic? No   Discharge Plan A Home;Home with family   Discharge Plan B Home;Home with family   DME Needed Upon Discharge  glucometer   Patient/Family in Agreement with Plan yes

## 2020-09-13 NOTE — NURSING
HS CBG- 356; rechecked at 0030 CBG- 270; Notified Fartun Garcia NP; no additional dose of insulin at this time; recheck in am with breakfast as pt started on Levemir. GARCIA COLON RN 09/13/2020 1:29 AM

## 2020-09-13 NOTE — PROGRESS NOTES
[facility] Medicine  Progress Note    Patient Name: Etta Bill  MRN: [id number]  Patient Class: IP- Inpatient   Admission Date: 9/11/2020  Length of Stay: 2 days  Attending Physician: Myrna Plaza MD  Primary Care Provider: Primary Doctor No        Subjective:     Principal Problem:Uncontrolled diabetes mellitus        HPI:  Etta Bill is a 31 y/o female with no significant past medical history who presented to the ED with c/o SOB and generalized weakness which began 3-4 days ago.  Associated symptoms include polyuria and polydipsia.  States SOB worse with activity.  Pt states her friend checked pt's blood glucose with her accucheck machine and glucose was 293.  ED workup indicates that pt is in DKA. Denies hx diabetes or any previous similar episode.  She will be admitted to the service of hospital medicine and monitored closely in the ICU.    Overview/Hospital Course:  No notes on file    Interval History:  Patient currently doing well blood sugar in the 200s no acute events overnight    Review of Systems   Constitutional: Negative for appetite change, chills, fatigue and fever.   HENT: Negative for congestion, hearing loss, rhinorrhea, sore throat, trouble swallowing and voice change.    Respiratory: Negative for cough, chest tightness, shortness of breath and wheezing.    Cardiovascular: Negative for chest pain, palpitations and leg swelling.   Gastrointestinal: Negative for abdominal pain, blood in stool, diarrhea, nausea and vomiting.   Genitourinary: Negative for difficulty urinating, frequency, hematuria and urgency.   Musculoskeletal: Negative for back pain, joint swelling and neck stiffness.   Skin: Negative for pallor and rash.   Neurological: Negative for tremors, seizures, syncope, speech difficulty, weakness, numbness and headaches.   Hematological: Negative for adenopathy.   Psychiatric/Behavioral: Negative for agitation, behavioral problems,  confusion and sleep disturbance.     Objective:     Vital Signs (Most Recent):  Temp: 98.1 °F (36.7 °C) (09/13/20 1115)  Pulse: 84 (09/13/20 1500)  Resp: 20 (09/13/20 1500)  BP: 97/61 (09/13/20 1500)  SpO2: 100 % (09/13/20 1500) Vital Signs (24h Range):  Temp:  [97.5 °F (36.4 °C)-98.1 °F (36.7 °C)] 98.1 °F (36.7 °C)  Pulse:  [] 84  Resp:  [15-53] 20  SpO2:  [98 %-100 %] 100 %  BP: ()/(52-81) 97/61     Weight: 77.8 kg (171 lb 8.3 oz)  Body mass index is 32.41 kg/m².    Intake/Output Summary (Last 24 hours) at 9/13/2020 1655  Last data filed at 9/13/2020 1600  Gross per 24 hour   Intake 490 ml   Output 2150 ml   Net -1660 ml      Physical Exam  Constitutional:       General: She is not in acute distress.     Appearance: She is well-developed. She is not diaphoretic.   HENT:      Head: Normocephalic and atraumatic.      Right Ear: External ear normal.      Left Ear: External ear normal.      Nose: Nose normal.   Eyes:      General: No scleral icterus.        Right eye: No discharge.         Left eye: No discharge.      Conjunctiva/sclera: Conjunctivae normal.      Pupils: Pupils are equal, round, and reactive to light.   Neck:      Musculoskeletal: Normal range of motion and neck supple.      Thyroid: No thyromegaly.   Cardiovascular:      Rate and Rhythm: Normal rate and regular rhythm.      Heart sounds: Normal heart sounds. No murmur.   Pulmonary:      Effort: Pulmonary effort is normal. No respiratory distress.      Breath sounds: Normal breath sounds. No stridor. No wheezing or rales.   Abdominal:      General: Bowel sounds are normal. There is no distension.      Palpations: Abdomen is soft.      Tenderness: There is no abdominal tenderness.   Musculoskeletal:         General: No tenderness.   Lymphadenopathy:      Cervical: No cervical adenopathy.   Skin:     General: Skin is warm and dry.      Capillary Refill: Capillary refill takes less than 2 seconds.      Findings: No erythema or rash.    Neurological:      Mental Status: She is alert and oriented to person, place, and time.      Cranial Nerves: No cranial nerve deficit.      Sensory: No sensory deficit.      Motor: No abnormal muscle tone.      Coordination: Coordination normal.   Psychiatric:         Behavior: Behavior normal.         Thought Content: Thought content normal.         Judgment: Judgment normal.         Significant Labs:   BMP:   Recent Labs   Lab 09/13/20  0426 09/13/20  0808   GLU  --  225*   NA  --  140   K  --  3.3*   CL  --  111*   CO2  --  16*   BUN  --  4*   CREATININE  --  0.8   CALCIUM  --  8.6*   MG 1.8  --      CBC:   Recent Labs   Lab 09/11/20  1947 09/12/20  0402 09/13/20  0426   WBC 7.65 7.49 5.11   HGB 16.3* 13.7 13.2   HCT 48.4 40.5 38.6    177 164       Significant Imaging: I have reviewed all pertinent imaging results/findings within the past 24 hours.      Assessment/Plan:      * Uncontrolled diabetes mellitus  Appears to be new onset DM.  Patient's FSGs are not controlled on current hypoglycemics.   Last A1c reviewed-   Lab Results   Component Value Date    HGBA1C 10.3 (H) 09/12/2020     Most recent fingerstick glucose reviewed-   Recent Labs   Lab 09/13/20  0041 09/13/20  0733 09/13/20  1113 09/13/20  1646   POCTGLUCOSE 270* 216* 240* 309*     Current correctional scale  Low  Increase anti-hyperglycemic dose as follows-   Antihyperglycemics (From admission, onward)    Start     Stop Route Frequency Ordered    09/14/20 0715  insulin aspart U-100 pen 6 Units      -- SubQ 3 times daily with meals 09/13/20 1654    09/13/20 2100  insulin detemir U-100 pen 20 Units      -- SubQ Nightly 09/13/20 1654    09/12/20 1629  insulin aspart U-100 pen 0-5 Units      -- SubQ Before meals & nightly PRN 09/12/20 1530    09/11/20 2145  insulin regular 100 Units in sodium chloride 0.9% 100 mL infusion     Question:  Insulin Rate Adjustment (DO NOT MODIFY ANSWER)  Answer:   [id number]    09/12 1700 IV Continuous 09/11/20 2032        Consult diabetic educator  Will need close outpatient follow up.        Hypokalemia  Most likely secondary to poor oral intake and intracellular shift  Replete as needed    Hypophosphatemia  Most likely secondary to poor oral intake  Will repeat as needed        VTE Risk Mitigation (From admission, onward)         Ordered     enoxaparin injection 40 mg  Every 24 hours      09/11/20 2237     IP VTE HIGH RISK PATIENT  Once      09/11/20 2237     Place ROMELIA hose  Until discontinued      09/11/20 2237     Place sequential compression device  Until discontinued      09/11/20 2237                Discharge Planning   ADOLFO:      Code Status: Full Code   Is the patient medically ready for discharge?:     Reason for patient still in hospital (select all that apply): Treatment  Discharge Plan A: Home, Home with family            Critical care time spent on the evaluation and treatment of severe organ dysfunction, review of pertinent labs and imaging studies, discussions with consulting providers and discussions with patient/family: 60 minutes.      Myrna Plaza MD  Department of Hospital Medicine   [facility]

## 2020-09-13 NOTE — SUBJECTIVE & OBJECTIVE
Interval History:  Patient currently doing well blood sugar in the 200s no acute events overnight    Review of Systems   Constitutional: Negative for appetite change, chills, fatigue and fever.   HENT: Negative for congestion, hearing loss, rhinorrhea, sore throat, trouble swallowing and voice change.    Respiratory: Negative for cough, chest tightness, shortness of breath and wheezing.    Cardiovascular: Negative for chest pain, palpitations and leg swelling.   Gastrointestinal: Negative for abdominal pain, blood in stool, diarrhea, nausea and vomiting.   Genitourinary: Negative for difficulty urinating, frequency, hematuria and urgency.   Musculoskeletal: Negative for back pain, joint swelling and neck stiffness.   Skin: Negative for pallor and rash.   Neurological: Negative for tremors, seizures, syncope, speech difficulty, weakness, numbness and headaches.   Hematological: Negative for adenopathy.   Psychiatric/Behavioral: Negative for agitation, behavioral problems, confusion and sleep disturbance.     Objective:     Vital Signs (Most Recent):  Temp: 98.1 °F (36.7 °C) (09/13/20 1115)  Pulse: 84 (09/13/20 1500)  Resp: 20 (09/13/20 1500)  BP: 97/61 (09/13/20 1500)  SpO2: 100 % (09/13/20 1500) Vital Signs (24h Range):  Temp:  [97.5 °F (36.4 °C)-98.1 °F (36.7 °C)] 98.1 °F (36.7 °C)  Pulse:  [] 84  Resp:  [15-53] 20  SpO2:  [98 %-100 %] 100 %  BP: ()/(52-81) 97/61     Weight: 77.8 kg (171 lb 8.3 oz)  Body mass index is 32.41 kg/m².    Intake/Output Summary (Last 24 hours) at 9/13/2020 1655  Last data filed at 9/13/2020 1600  Gross per 24 hour   Intake 490 ml   Output 2150 ml   Net -1660 ml      Physical Exam  Constitutional:       General: She is not in acute distress.     Appearance: She is well-developed. She is not diaphoretic.   HENT:      Head: Normocephalic and atraumatic.      Right Ear: External ear normal.      Left Ear: External ear normal.      Nose: Nose normal.   Eyes:      General: No  scleral icterus.        Right eye: No discharge.         Left eye: No discharge.      Conjunctiva/sclera: Conjunctivae normal.      Pupils: Pupils are equal, round, and reactive to light.   Neck:      Musculoskeletal: Normal range of motion and neck supple.      Thyroid: No thyromegaly.   Cardiovascular:      Rate and Rhythm: Normal rate and regular rhythm.      Heart sounds: Normal heart sounds. No murmur.   Pulmonary:      Effort: Pulmonary effort is normal. No respiratory distress.      Breath sounds: Normal breath sounds. No stridor. No wheezing or rales.   Abdominal:      General: Bowel sounds are normal. There is no distension.      Palpations: Abdomen is soft.      Tenderness: There is no abdominal tenderness.   Musculoskeletal:         General: No tenderness.   Lymphadenopathy:      Cervical: No cervical adenopathy.   Skin:     General: Skin is warm and dry.      Capillary Refill: Capillary refill takes less than 2 seconds.      Findings: No erythema or rash.   Neurological:      Mental Status: She is alert and oriented to person, place, and time.      Cranial Nerves: No cranial nerve deficit.      Sensory: No sensory deficit.      Motor: No abnormal muscle tone.      Coordination: Coordination normal.   Psychiatric:         Behavior: Behavior normal.         Thought Content: Thought content normal.         Judgment: Judgment normal.         Significant Labs:   BMP:   Recent Labs   Lab 09/13/20  0426 09/13/20  0808   GLU  --  225*   NA  --  140   K  --  3.3*   CL  --  111*   CO2  --  16*   BUN  --  4*   CREATININE  --  0.8   CALCIUM  --  8.6*   MG 1.8  --      CBC:   Recent Labs   Lab 09/11/20  1947 09/12/20  0402 09/13/20  0426   WBC 7.65 7.49 5.11   HGB 16.3* 13.7 13.2   HCT 48.4 40.5 38.6    177 164       Significant Imaging: I have reviewed all pertinent imaging results/findings within the past 24 hours.

## 2020-09-13 NOTE — ASSESSMENT & PLAN NOTE
Appears to be new onset DM.  Patient's FSGs are not controlled on current hypoglycemics.   Last A1c reviewed-   Lab Results   Component Value Date    HGBA1C 10.3 (H) 09/12/2020     Most recent fingerstick glucose reviewed-   Recent Labs   Lab 09/13/20  0041 09/13/20  0733 09/13/20  1113 09/13/20  1646   POCTGLUCOSE 270* 216* 240* 309*     Current correctional scale  Low  Increase anti-hyperglycemic dose as follows-   Antihyperglycemics (From admission, onward)    Start     Stop Route Frequency Ordered    09/14/20 0715  insulin aspart U-100 pen 6 Units      -- SubQ 3 times daily with meals 09/13/20 1654    09/13/20 2100  insulin detemir U-100 pen 20 Units      -- SubQ Nightly 09/13/20 1654    09/12/20 1629  insulin aspart U-100 pen 0-5 Units      -- SubQ Before meals & nightly PRN 09/12/20 1530    09/11/20 2145  insulin regular 100 Units in sodium chloride 0.9% 100 mL infusion     Question:  Insulin Rate Adjustment (DO NOT MODIFY ANSWER)  Answer:  [email or web address]    09/12 1700 IV Continuous 09/11/20 2032        Consult diabetic educator  Will need close outpatient follow up.

## 2020-09-14 VITALS
DIASTOLIC BLOOD PRESSURE: 67 MMHG | HEIGHT: 61 IN | OXYGEN SATURATION: 100 % | BODY MASS INDEX: 32.38 KG/M2 | RESPIRATION RATE: 11 BRPM | WEIGHT: 171.5 LBS | TEMPERATURE: 97 F | SYSTOLIC BLOOD PRESSURE: 102 MMHG | HEART RATE: 84 BPM

## 2020-09-14 PROBLEM — E83.39 HYPOPHOSPHATEMIA: Status: RESOLVED | Noted: 2020-09-13 | Resolved: 2020-09-14

## 2020-09-14 PROBLEM — R73.9 HYPERGLYCEMIA: Status: RESOLVED | Noted: 2020-09-13 | Resolved: 2020-09-14

## 2020-09-14 PROBLEM — E87.6 HYPOKALEMIA: Status: RESOLVED | Noted: 2020-09-13 | Resolved: 2020-09-14

## 2020-09-14 LAB
ANION GAP SERPL CALC-SCNC: 12 MMOL/L (ref 8–16)
BASOPHILS # BLD AUTO: 0.02 K/UL (ref 0–0.2)
BASOPHILS NFR BLD: 0.4 % (ref 0–1.9)
BUN SERPL-MCNC: 4 MG/DL (ref 6–20)
CALCIUM SERPL-MCNC: 9.2 MG/DL (ref 8.7–10.5)
CHLORIDE SERPL-SCNC: 106 MMOL/L (ref 95–110)
CO2 SERPL-SCNC: 25 MMOL/L (ref 23–29)
CREAT SERPL-MCNC: 0.8 MG/DL (ref 0.5–1.4)
DIFFERENTIAL METHOD: NORMAL
EOSINOPHIL # BLD AUTO: 0.1 K/UL (ref 0–0.5)
EOSINOPHIL NFR BLD: 1.8 % (ref 0–8)
ERYTHROCYTE [DISTWIDTH] IN BLOOD BY AUTOMATED COUNT: 14.3 % (ref 11.5–14.5)
EST. GFR  (AFRICAN AMERICAN): >60 ML/MIN/1.73 M^2
EST. GFR  (NON AFRICAN AMERICAN): >60 ML/MIN/1.73 M^2
GLUCOSE SERPL-MCNC: 165 MG/DL (ref 70–110)
HCT VFR BLD AUTO: 37.7 % (ref 37–48.5)
HGB BLD-MCNC: 12.9 G/DL (ref 12–16)
IMM GRANULOCYTES # BLD AUTO: 0.01 K/UL (ref 0–0.04)
IMM GRANULOCYTES NFR BLD AUTO: 0.2 % (ref 0–0.5)
LYMPHOCYTES # BLD AUTO: 2 K/UL (ref 1–4.8)
LYMPHOCYTES NFR BLD: 40.3 % (ref 18–48)
MAGNESIUM SERPL-MCNC: 2.2 MG/DL (ref 1.6–2.6)
MCH RBC QN AUTO: 29.3 PG (ref 27–31)
MCHC RBC AUTO-ENTMCNC: 34.2 G/DL (ref 32–36)
MCV RBC AUTO: 86 FL (ref 82–98)
MONOCYTES # BLD AUTO: 0.5 K/UL (ref 0.3–1)
MONOCYTES NFR BLD: 10.8 % (ref 4–15)
NEUTROPHILS # BLD AUTO: 2.3 K/UL (ref 1.8–7.7)
NEUTROPHILS NFR BLD: 46.5 % (ref 38–73)
NRBC BLD-RTO: 0 /100 WBC
PHOSPHATE SERPL-MCNC: 3 MG/DL (ref 2.7–4.5)
PLATELET # BLD AUTO: 160 K/UL (ref 150–350)
PMV BLD AUTO: 11.5 FL (ref 9.2–12.9)
POCT GLUCOSE: 174 MG/DL (ref 70–110)
POCT GLUCOSE: 295 MG/DL (ref 70–110)
POTASSIUM SERPL-SCNC: 3.8 MMOL/L (ref 3.5–5.1)
RBC # BLD AUTO: 4.41 M/UL (ref 4–5.4)
SODIUM SERPL-SCNC: 143 MMOL/L (ref 136–145)
WBC # BLD AUTO: 5.01 K/UL (ref 3.9–12.7)

## 2020-09-14 PROCEDURE — 25000003 PHARM REV CODE 250: Performed by: INTERNAL MEDICINE

## 2020-09-14 PROCEDURE — 94761 N-INVAS EAR/PLS OXIMETRY MLT: CPT

## 2020-09-14 PROCEDURE — 97803 MED NUTRITION INDIV SUBSEQ: CPT

## 2020-09-14 PROCEDURE — 80048 BASIC METABOLIC PNL TOTAL CA: CPT

## 2020-09-14 PROCEDURE — 83735 ASSAY OF MAGNESIUM: CPT

## 2020-09-14 PROCEDURE — 36415 COLL VENOUS BLD VENIPUNCTURE: CPT

## 2020-09-14 PROCEDURE — 84100 ASSAY OF PHOSPHORUS: CPT

## 2020-09-14 PROCEDURE — 85025 COMPLETE CBC W/AUTO DIFF WBC: CPT

## 2020-09-14 RX ORDER — LANCETS
1 EACH MISCELLANEOUS
Qty: 120 EACH | Refills: 1 | Status: SHIPPED | OUTPATIENT
Start: 2020-09-14

## 2020-09-14 RX ORDER — BLOOD SUGAR DIAGNOSTIC
1 STRIP MISCELLANEOUS
Qty: 120 EACH | Refills: 0 | Status: SHIPPED | OUTPATIENT
Start: 2020-09-14 | End: 2020-10-05 | Stop reason: SDUPTHER

## 2020-09-14 RX ORDER — INSULIN DETEMIR 100 [IU]/ML
20 INJECTION, SOLUTION SUBCUTANEOUS NIGHTLY
Qty: 18 ML | Refills: 3 | Status: SHIPPED | OUTPATIENT
Start: 2020-09-14 | End: 2020-09-28

## 2020-09-14 RX ORDER — INSULIN ASPART 100 [IU]/ML
6 INJECTION, SOLUTION INTRAVENOUS; SUBCUTANEOUS
Qty: 16.2 ML | Refills: 3 | Status: SHIPPED | OUTPATIENT
Start: 2020-09-14 | End: 2020-09-28

## 2020-09-14 RX ORDER — POTASSIUM CHLORIDE 1500 MG/1
40 TABLET, EXTENDED RELEASE ORAL
Status: DISCONTINUED | OUTPATIENT
Start: 2020-09-14 | End: 2020-09-14 | Stop reason: HOSPADM

## 2020-09-14 RX ORDER — INSULIN PUMP SYRINGE, 3 ML
EACH MISCELLANEOUS
Qty: 1 EACH | Refills: 0 | Status: SHIPPED | OUTPATIENT
Start: 2020-09-14 | End: 2024-03-25

## 2020-09-14 RX ADMIN — FAMOTIDINE 20 MG: 20 TABLET ORAL at 07:09

## 2020-09-14 RX ADMIN — INSULIN ASPART 3 UNITS: 100 INJECTION, SOLUTION INTRAVENOUS; SUBCUTANEOUS at 11:09

## 2020-09-14 RX ADMIN — INSULIN ASPART 6 UNITS: 100 INJECTION, SOLUTION INTRAVENOUS; SUBCUTANEOUS at 11:09

## 2020-09-14 RX ADMIN — INSULIN ASPART 6 UNITS: 100 INJECTION, SOLUTION INTRAVENOUS; SUBCUTANEOUS at 07:09

## 2020-09-14 NOTE — NURSING
Patient informed to download the [employer] dasha to attempt a discount on her medications due to high deductible. Patient discharged to home at this time with spouse escorted by Nito with transport.

## 2020-09-14 NOTE — CONSULTS
Extensive education with patient regarding her new diagnosis of Type 1 diabetes. Educated patient regarding diagnosis, medication management and monitoring of glucose. Patient given information regarding every aspect of her new diagnosis. Spoke with pharmacist at [facility] regarding if patients medications and glucose monitoring device is covered by her insurance. Verified with the patients pharmacy that her co-pay for both insulins together are $290.00 and the continuous monitoring system is not covered. Due to patient having a high deductible she will have to pay full cost for a glucometer and supplies. Instructed the patient to buy a glucometer over the counter to save costs.

## 2020-09-14 NOTE — NURSING
Discharge instructions reviewed with patient and patients spouse. Both verbalize understanding regarding discharge instructions. Patient cleared to discharge awaiting transport to escort pt to vehicle.

## 2020-09-14 NOTE — PROGRESS NOTES
Food & Nutrition                                                           Education    Diet Education: diabetic diet  Time Spent: 10 minutes  Learners: Patient and family member      Nutrition Education provided with handouts: yes      Comments: Patient discharging today. Was educated ont he diabetic diet over the phone this weekend. Reviewed carbohydrate counting with pt, answered her questions, and gave her a carbohydrate counting booklet and demonstration. Teach back method utilized. Agreeable to plan.    Assessment: NFPE done 9/14/20, no wasting seen. Good appetite.  All questions and concerns answered. Dietitian's contact information provided.       Follow-Up: yes    Please Re-consult as needed        Thanks!

## 2020-09-14 NOTE — PLAN OF CARE
Pt is cleared from  for D/C.       09/14/20 4383   Final Note   Assessment Type Final Discharge Note   Anticipated Discharge Disposition Home   Hospital Follow Up  Appt(s) scheduled? Yes

## 2020-09-14 NOTE — PLAN OF CARE
Pt to be discharged home to self care pending diabetic educator rounding, uneventful shift, blood glucose checked and scheduled novalog plus sliding scale administered prior to meals, patient able to administer to self.

## 2020-09-15 ENCOUNTER — PATIENT OUTREACH (OUTPATIENT)
Dept: ADMINISTRATIVE | Facility: CLINIC | Age: 30
End: 2020-09-15

## 2020-09-15 NOTE — PROGRESS NOTES
C3 nurse attempted to contact patient. No answer. The following message was left for the patient to return the call:  Good afternoon,  I am a nurse calling on behalf of [facility] from the [facility].  This is a Transitional Care Call for Etta Bill. When you have a moment please contact us at [phone] or [phone] Monday through Friday, between the hours of 8 am to 4 pm. We look forward to speaking with you. On behalf of [facility] have a nice day.    The patient has a scheduled [facility] appointment with, [facility],  Glory Ward NP on 9/22 @1000,video call.

## 2020-09-17 NOTE — HOSPITAL COURSE
Patient presented with hypoglycemia and diabetic ketoacidosis.  Patient was placed on insulin drip, patient's anion gap was monitored.  When patient anion gap closed patient was transitioned from insulin drip to scheduled insulin.  Blood sugar was monitored over the course of next 2 days and insulin was readjusted.  Patient symptoms improved over the course of hospital stay and was clinically stable for discharge.  Diabetic educator was consulted, Discharge instructions reviewed with patient and patients spouse. Both verbalize understanding regarding discharge instructions.  Patient was discharged on Levemir and NovoLog

## 2020-09-17 NOTE — PROGRESS NOTES
C3 nurse attempted to contact Etta Bill for a TCC post hospital discharge follow up call. No answer. C3 nurse left a voice message and OOC # given. The patient has a scheduled HOSFU appointment with, [facility],  Glory Ward NP on 9/22 @1000,video call.

## 2020-09-17 NOTE — DISCHARGE SUMMARY
[facility] Hospital Medicine  Discharge Summary      Patient Name: Etta Bill  MRN: [id number]  Admission Date: 9/11/2020  Hospital Length of Stay: 3 days  Discharge Date and Time: 9/14/2020 3 PM  Attending Physician: Jacqueline att. providers found   Discharging Provider: Rachel Plaza MD  Primary Care Provider: Primary Doctor Jacqueline      HPI:   Etta Bill is a 31 y/o female with no significant past medical history who presented to the ED with c/o SOB and generalized weakness which began 3-4 days ago.  Associated symptoms include polyuria and polydipsia.  States SOB worse with activity.  Pt states her friend checked pt's blood glucose with her accucheck machine and glucose was 293.  ED workup indicates that pt is in DKA. Denies hx diabetes or any previous similar episode.  She will be admitted to the service of hospital medicine and monitored closely in the ICU.    * No surgery found *      Hospital Course:   Patient presented with hypoglycemia and diabetic ketoacidosis.  Patient was placed on insulin drip, patient's anion gap was monitored.  When patient anion gap closed patient was transitioned from insulin drip to scheduled insulin.  Blood sugar was monitored over the course of next 2 days and insulin was readjusted.  Patient symptoms improved over the course of hospital stay and was clinically stable for discharge.  Diabetic educator was consulted, Discharge instructions reviewed with patient and patients spouse. Both verbalize understanding regarding discharge instructions.  Patient was discharged on Levemir and NovoLog     Consults:   Consults (From admission, onward)        Status Ordering Provider     Inpatient consult to Diabetes educator  Once     Provider:  (Not yet assigned)    Completed RACHEL PLAZA     Inpatient consult to Registered Dietitian/Nutritionist  Once     Provider:  (Not yet assigned)    Completed SANG VASQUEZ new Assessment & Plan  notes have been filed under this hospital service since the last note was generated.  Service: Hospital Medicine    Final Active Diagnoses:    Diagnosis Date Noted POA    PRINCIPAL PROBLEM:  Uncontrolled diabetes mellitus [E11.65] 09/11/2020 Yes      Problems Resolved During this Admission:    Diagnosis Date Noted Date Resolved POA    Hyperglycemia [R73.9] 09/13/2020 09/14/2020 Yes    Hypophosphatemia [E83.39] 09/13/2020 09/14/2020 Yes    Hypokalemia [E87.6] 09/13/2020 09/14/2020 Yes       Discharged Condition: stable    Disposition: Home or Self Care    Follow Up:  Follow-up Information     [facility] On 9/22/2020.    Why: Pt sees Glory Ward NP. hospital f/u appt:  Tuesday, 9/22/2020 @ 10:00am video call  Contact information:  [address]  [address] [address]  [phone]                 Patient Instructions:      Ambulatory referral/consult to Endocrinology   Standing Status: Future   Referral Priority: Routine Referral Type: Consultation   Requested Specialty: Endocrinology   Number of Visits Requested: 1     Ambulatory referral/consult to Diabetes Education   Standing Status: Future   Referral Priority: Routine Referral Type: Consultation   Referral Reason: Specialty Services Required   Requested Specialty: Diabetes   Number of Visits Requested: 1 Expiration Date: 09/14/21     Notify your health care provider if you experience any of the following:  temperature >100.4     Notify your health care provider if you experience any of the following:  severe uncontrolled pain     POCT GLUCOSE MONITORING CONTINUOUS     Activity as tolerated       Significant Diagnostic Studies: Labs: BMP: No results for input(s): GLU, NA, K, CL, CO2, BUN, CREATININE, CALCIUM, MG in the last 48 hours. and CBC No results for input(s): WBC, HGB, HCT, PLT in the last 48 hours.  Microbiology: Blood Culture No results found for: LABBLOO and Urine Culture  No results found for:  "LABURIN    Pending Diagnostic Studies:     None         Medications:  Reconciled Home Medications:      Medication List      START taking these medications    blood-glucose meter kit  Commonly known as: FREESTYLE SYSTEM KIT  Use as instructed     insulin aspart U-100 100 unit/mL injection  Commonly known as: NovoLOG U-100 Insulin aspart  Inject 6 Units into the skin 3 (three) times daily before meals.     lancets Misc  Commonly known as: ONETOUCH ULTRASOFT LANCETS  1 each by Misc.(Non-Drug; Combo Route) route 4 (four) times daily before meals and nightly.     LEVEMIR FLEXTOUCH U-100 INSULN 100 unit/mL (3 mL) Inpn pen  Generic drug: insulin detemir U-100  Inject 20 Units into the skin every evening.     pen needle, diabetic 32 gauge x 1/4" Ndle  Commonly known as: BD ULTRA-FINE MICRO PEN NEEDLE  1 Units by Misc.(Non-Drug; Combo Route) route 4 (four) times daily with meals and nightly.            Indwelling Lines/Drains at time of discharge:   Lines/Drains/Airways     None                 Time spent on the discharge of patient: 60 minutes  Patient was seen and examined on the date of discharge and determined to be suitable for discharge.    Critical care time spent on the evaluation and treatment of severe organ dysfunction, review of pertinent labs and imaging studies, discussions with consulting providers and discussions with patient/family: 60 minutes.     Myrna Plaza MD  Department of Hospital Medicine  [facility]  "

## 2020-09-18 NOTE — PHYSICIAN QUERY
"PT Name: Etta Bill  MR #: [id number]     Diabetic Condition Clarification     CDS/: David Curtis RN               Contact information:   [email or web address]      This form is a permanent document in the medical record.     Query Date: 2020    By submitting this query, we are merely seeking further clarification of documentation to reflect the severity of illness of your patient. Please utilize your independent clinical judgment when addressing the question(s) below.    The medical record reflects the following:     Indicators   Supporting Clinical Findings Location in Medical Record   x Diabetes uncontrolled documented DKA (diabetic ketoacidoses)  Appears to be new onset DM.    Extensive education with patient regarding her new diagnosis of Type 1 diabetes...    PN, Hospital medicine       consult- Diabetes, SUSAN Hyde   x Lab Value(s), POCT glucose value(s)   Glucose: 330    Glucose: 216    Glucose: 225, 350    Glucose: 165   Labs  "  "  "   x Beta-OHxy Butyric Acid levels     BHB: 6.8 Labs      Serum Osmolarity     x pH    VBG: pH: 7.206   Point of care   x Anion gap/ Bicarb levels    A/ Bicarb 10    A/  Bicarb 9 Labs   "    Treatment/Medication      Other       Provider, please specify the type of Diabetes :    [   ] Diabetes mellitus Type 1 with hyperglycemia   [x   ] Diabetes mellitus Type 2 with hyperglycemia   [   ] Other diabetes complication (please specify): ____________   [   ]  Clinically Undetermined       Please document in your progress notes daily for the duration of treatment until resolved, and include in your discharge summary.  " There are no  lab orders in the patient's chart.  To PCP please advise on lab orders?  Upcoming appointment on 19.  Last labs done on 18.

## 2020-09-23 ENCOUNTER — CLINICAL SUPPORT (OUTPATIENT)
Dept: DIABETES | Facility: CLINIC | Age: 30
End: 2020-09-23
Payer: COMMERCIAL

## 2020-09-23 VITALS — BODY MASS INDEX: 32.17 KG/M2 | WEIGHT: 170.38 LBS | HEIGHT: 61 IN

## 2020-09-23 DIAGNOSIS — E10.65 UNCONTROLLED TYPE 1 DIABETES MELLITUS WITH HYPERGLYCEMIA: Primary | ICD-10-CM

## 2020-09-23 PROCEDURE — G0108 PR DIAB MANAGE TRN  PER INDIV: ICD-10-PCS | Mod: 95,,, | Performed by: DIETITIAN, REGISTERED

## 2020-09-23 PROCEDURE — G0108 DIAB MANAGE TRN  PER INDIV: HCPCS | Mod: 95,,, | Performed by: DIETITIAN, REGISTERED

## 2020-09-23 NOTE — PATIENT INSTRUCTIONS
Diabetes: Meal Planning    You can help keep your blood sugar level in your target range by eating healthy foods. Your healthcare team can help you create a low-fat, nutritious meal plan. Take an active role in your diabetes management by following your meal plan and working with your healthcare team.  Make your meal plan  A meal plan gives guidelines for the types and amounts of food you should eat. The goal is to balance food and insulin (or other diabetes medications) so your blood sugars will be in your target range. Your dietitian will help you make a flexible meal plan that includes many foods that you like.  Watch serving sizes  Your meal plan will group foods by servings. To learn how much a serving is, start by measuring food portions at each meal. Soon youll know what a serving looks like on your plate. Ask your healthcare provider about how to balance servings of different foods.  Eat from all the food groups  The basis of a healthy meal plan is variety (eating lots of different foods). Choose lean meats, fresh fruits and vegetables, whole grains, and low-fat or nonfat dairy products. Eating a wide variety of foods provides the nutrients your body needs. It can also keep you from getting bored with your meal plan.  Learn about carbohydrates, fats, and protein  · Carbohydrates are starches, sugars, and fiber. They are found in many foods, including fruit, bread, pasta, milk, and sweets. Of all the foods you eat, carbohydrates have the most effect on your blood sugar. Your dietitian may teach you about carb counting, a way to figure out the number of carbohydrates in a meal.  · Fats have the most calories. They also have the most effect on your weight and your risk of heart disease. When you have diabetes, its important to control your weight and protect your heart. Foods that are high in fat include whole milk, cheese, snack foods, and desserts.  · Protein is important for building and repairing  muscles and bones. Choose low-fat protein sources, such as fish, egg whites, and skinless chicken.  Reduce liquid sugars  Extra calories from sodas, sports drinks, and fruit drinks make it hard to keep blood sugar in range. Cut as many liquid sugars from your meal plan as you can.  This includes most fruit juices, which are often high in natural or added sugar. Instead, drink plenty of water and other sugar-free beverages.  Eat less fat  If you need to lose weight, try to reduce the amount of fat in your diet. This can also help lower your cholesterol level to keep blood vessels healthier. Cut fat by using only small amounts of liquid oil for cooking. Read food labels carefully to avoid foods with unhealthy trans fats.  Timing your meals  When it comes to blood sugar control, when you eat is as important as what you eat. You may need to eat several small meals spaced evenly throughout the day to stay in your target range. So dont skip breakfast or wait until late in the day to get most of your calories. Doing so can cause your blood sugar to rise too high or fall too low.   Date Last Reviewed: 3/1/2016  © 6433-5756 [employer]. [address] [place] [address]. All rights reserved. This information is not intended as a substitute for professional medical care. Always follow your healthcare professional's instructions.        Understanding Carbohydrates, Fats, and Protein  Food is a source of fuel and nourishment for your body. Its also a source of pleasure. Having diabetes doesnt mean you have to eat special foods or give up desserts. Instead, your dietitian can show you how to plan meals to suit your body. To start, learn how different foods affect blood sugar.  Carbohydrates  Carbohydrates are the main source of fuel for the body. Carbohydrates raise blood sugar. Many people think carbohydrates are only found in pasta or bread. But carbohydrates are actually in many kinds of  foods:  · Sugars occur naturally in foods such as fruit, milk, honey, and molasses. Sugars can also be added to many foods, from cereals and yogurt to candy and desserts. Sugars raise blood sugar.  · Starches are found in bread, cereals, pasta, and dried beans. Theyre also found in corn, peas, potatoes, yam, acorn squash, and butternut squash. Starches also raise blood sugar.   · Fiber is found in foods such as vegetables, fruits, beans, and whole grains. Unlike other carbs, fiber isnt digested or absorbed. So it doesnt raise blood sugar. In fact, fiber can help keep blood sugar from rising too fast. It also helps keep blood cholesterol at a healthy level.  Did you know?  Even though carbohydrates raise blood sugar, its best to have some in every meal. They are an important part of a healthy diet.   Fat  Fat is an energy source that can be stored until needed. Fat does not raise blood sugar. However, it can raise blood cholesterol, increasing the risk of heart disease. Fat is also high in calories, which can cause weight gain. Not all types of fat are the same.  More Healthy:  · Monounsaturated fats are mostly found in vegetable oils, such as olive, canola, and peanut oils. They are also found in avocados and some nuts. Monounsaturated fats are healthy for your heart. Thats because they lower LDL (unhealthy) cholesterol.  · Polyunsaturated fats are mostly found in vegetable oils, such as corn, safflower, and soybean oils. They are also found in some seeds, nuts, and fish. Polyunsaturated fats lower LDL (unhealthy) cholesterol. So, choosing them instead of saturated fats is healthy for your heart. Certain unsaturated fats can help lower triglycerides.   Less Healthy:  · Saturated fats are found in animal products, such as meat, poultry, whole milk, lard, and butter. Saturated fats raise LDL cholesterol and are not healthy for your heart.  · Hydrogenated oils and trans fats are formed when vegetable oils are  processed into solid fats. They are found in many processed foods. Hydrogenated oils and trans fats raise LDL cholesterol and lower HDL (healthy) cholesterol. They are not healthy for your heart.  Protein  Protein helps the body build and repair muscle and other tissue. Protein has little or no effect on blood sugar. However, many foods that contain protein also contain saturated fat. By choosing low-fat protein sources, you can get the benefits of protein without the extra fat:  · Plant protein is found in dry beans and peas, nuts, and soy products, such as tofu and soymilk. These sources tend to be cholesterol-free and low in saturated fat.  · Animal protein is found in fish, poultry, meat, cheese, milk, and eggs. These contain cholesterol and can be high in saturated fat. Aim for lean, lower-fat choices.  Date Last Reviewed: 3/1/2016  © 9933-0753 [employer]. [address] [place] [address], [place] [address]. All rights reserved. This information is not intended as a substitute for professional medical care. Always follow your healthcare professional's instructions.

## 2020-09-23 NOTE — PROGRESS NOTES
Diabetes Education  Author: Aretha Chiu RD, CDE  Date: 9/23/2020    Diabetes Care Management Summary  Diabetes Education Record Assessment/Progress: Initial  Current Diabetes Risk Level: High     Last A1c:   Lab Results   Component Value Date    HGBA1C 10.3 (H) 09/12/2020     Last Visit with Diabetes Educator: Last Education Visit: Not Found  Last OPCM Referral: : Not Found   Enrolled in OPCM: No    Diabetes Care Specialist Virtual Visit Note   It was in the patient's best interest to receive diabetes self-management education and support services in this format to prevent the exposure to COVID-19.        The patient location is: home   The chief complaint leading to consultation is: Diabetes  Visit type: audiovisual  Total time spent with patient: 45 min   Each patient to whom he or she provides medical services by telemedicine is:  (1) informed of the relationship between the physician and patient and the respective role of any other health care provider with respect to management of the patient; and (2) notified that he or she may decline to receive medical services by telemedicine and may withdraw from such care at any time.    Diabetes Type  Diabetes Type : Type II(new diagnosis - was told Type 1 by hospital and type 2 by [facility])    Diabetes History  Diabetes Diagnosis: 0-1 year  Current Treatment: Diet, Insulin  Reviewed Problem List with Patient: Yes    Health Maintenance was reviewed today with patient. Discussed with patient importance of routine eye exams, foot exams/foot care, blood work (i.e.: A1c, microalbumin, and lipid), dental visits, yearly flu vaccine, and pneumonia vaccine as indicated by PCP. Patient verbalized understanding.     Health Maintenance Topics with due status: Not Due       Topic Last Completion Date    Lipid Panel 09/12/2020    Hemoglobin A1c 09/12/2020     Health Maintenance Due   Topic Date Due    Hepatitis C Screening  1990    Foot Exam  02/24/1998     Eye Exam  02/24/1998    Urine Microalbumin  02/24/1998    HIV Screening  02/24/2003    TETANUS VACCINE  02/24/2006    Pneumococcal Vaccine (Medium Risk) (1 of 1 - PPSV23) 02/24/2007    Cervical Cancer Screening  02/24/2009    Influenza Vaccine (1) 08/01/2020       Nutrition  Meal Planning: water, eats out seldom, 3 meals per day, skipping meals(has been following a keto diet since before July - was feeling really bad, started to add carbs back and was still feeling terrible, after getting off keto lost a good bit of weight unintentionally)  What type of sweetener do you use?: none  What type of beverages do you drink?: water  Meal Plan 24 Hour Recall - Breakfast: was previously not eating breakfast but now is eating a vegetarian omelet  Meal Plan 24 Hour Recall - Lunch: salad with okra  Meal Plan 24 Hour Recall - Dinner: cabbage with sausage and a salad  Meal Plan 24 Hour Recall - Snack: greek yogurt with strawberries, 4 triscuits with cheese, and almonds    Monitoring   Monitoring: Other  Self Monitoring : BS: 155, 166, 181, 188, 518, 294, 335, 230, 206, 211, 166, 293  Blood Glucose Logs: Yes  Do you use a personal continuous glucose monitor?: No  In the last month, how often have you had a low blood sugar reaction?: never(feeling bad in the 100-200 range)  What are your symptoms of low blood sugar?: weak, jittery, light headed, nauseated  How do you treat low blood sugar?: eat something  Can you tell when your blood sugar is too high?: yes(polyuria, polydipsia, dry mouth)  How do you treat high blood sugar?: medication, exercise    Exercise   Exercise Type: none(plans to start)  Frequency: Never    Current Diabetes Treatment   Current Treatment: Diet, Insulin    Social History  Preferred Learning Method: Face to Face, Reading Materials, Hands On  Primary Support: Self, Family  Educational Level: High School  Occupation: works at a law firm  Smoking Status: Never a Smoker  Alcohol Use: Rarely            DDS-2  Score  ( > 3 = SIGNIFICANT DISTRESS): 1                   Barriers to Change  Barriers to Change: None  Learning Challenges : None    Readiness to Learn   Readiness to Learn : Acceptance    Cultural Influences  Cultural Influences: No    Diabetes Education Assessment/Progress  Diabetes Disease Process (diabetes disease process and treatment options): Individual Session, Written Materials Provided, Instructed, Discussion, Comprehends Key Points  Nutrition (Incorporating nutritional management into one's lifestyle): Individual Session, Written Materials Provided, Instructed, Discussion, Comprehends Key Points  Physical Activity (incorporating physical activity into one's lifestyle): Individual Session, Written Materials Provided, Instructed, Discussion, Comprehends Key Points  Medications (states correct name, dose, onset, peak, duration, side effects & timing of meds): Individual Session, Written Materials Provided, Instructed, Discussion, Comprehends Key Points  Monitoring (monitoring blood glucose/other parameters & using results): Individual Session, Written Materials Provided, Instructed, Discussion, Comprehends Key Points  Acute Complications (preventing, detecting, and treating acute complications): Individual Session, Written Materials Provided, Instructed, Discussion, Comprehends Key Points  Chronic Complications (preventing, detecting, and treating chronic complications): Individual Session, Written Materials Provided, Instructed, Discussion, Comprehends Key Points  Clinical (diabetes, other pertinent medical history, and relevant comorbidities reviewed during visit): Individual Session, Written Materials Provided, Instructed, Discussion, Comprehends Key Points  Cognitive (knowledge of self-management skills, functional health literacy): Individual Session, Written Materials Provided, Instructed, Discussion, Comprehends Key Points  Psychosocial (emotional response to diabetes): Individual Session, Written  Materials Provided, Instructed, Comprehends Key Points, Discussion  Diabetes Distress and Support Systems: Individual Session, Written Materials Provided, Instructed, Discussion, Comprehends Key Points  Behavioral (readiness for change, lifestyle practices, self-care behaviors): Individual Session, Written Materials Provided, Instructed, Discussion, Comprehends Key Points  Patient educated on what is DM, T1DM, T2DM, risk factors, managing DM, DM diet, carbohydrate counting, meal planning, reading a food label, healthy snack options, benefits of physical activity, diabetes care schedule, foot care guidelines, diabetes and retinopathy screening, s/s hypo and hyperglycemia, long/short term complication of uncontrolled DM, importance of compliance with treatment plan, how to use a glucometer, reviewed understanding diabetes distress, medications for treating DM, their mechanism of action and possible side effects, reviewed current level and goal level for HgbA1c, blood glucose, microalbumin, and lipids. Patient provided with written literature, diabetes management resources and support, DM Management program contact information.    Goals  Patient has selected/evaluated goals during today's session: Yes, selected  Healthy Eating: Set  Start Date: 09/23/20  Target Date: 10/23/20  Physical Activity: Set  Start Date: 09/23/20  Target Date: 12/23/20         Diabetes Care Plan/Intervention  Education Plan/Intervention: Individual Follow-Up DSMT    Diabetes Meal Plan  Restrictions: Low Fat, Low Sodium, Restricted Carbohydrate  Calories: 1200  Carbohydrate Per Meal: 20-30g  Carbohydrate Per Snack : 7-15g  Fat: 35  Protein: 90    Today's Self-Management Care Plan was developed with the patient's input and is based on barriers identified during today's assessment.    The long and short-term goals in the care plan were written with the patient/caregiver's input. The patient has agreed to work toward these goals to improve her  overall diabetes control.      The patient received a copy of today's self-management plan and verbalized understanding of the care plan, goals, and all of today's instructions.      The patient was encouraged to communicate with her physician and care team regarding her condition(s) and treatment.  I provided the patient with my contact information today and encouraged her to contact me via phone or patient portal as needed.     Education Units of Time   Time Spent: 45 min

## 2020-09-28 ENCOUNTER — OFFICE VISIT (OUTPATIENT)
Dept: DIABETES | Facility: CLINIC | Age: 30
End: 2020-09-28
Payer: COMMERCIAL

## 2020-09-28 VITALS
OXYGEN SATURATION: 96 % | SYSTOLIC BLOOD PRESSURE: 117 MMHG | HEIGHT: 61 IN | HEART RATE: 93 BPM | BODY MASS INDEX: 34.06 KG/M2 | WEIGHT: 180.38 LBS | DIASTOLIC BLOOD PRESSURE: 75 MMHG

## 2020-09-28 DIAGNOSIS — Z71.9 HEALTH EDUCATION/COUNSELING: ICD-10-CM

## 2020-09-28 DIAGNOSIS — E10.649 TYPE 1 DIABETES MELLITUS WITH HYPOGLYCEMIA UNAWARENESS: ICD-10-CM

## 2020-09-28 DIAGNOSIS — E10.65 UNCONTROLLED TYPE 1 DIABETES MELLITUS WITH HYPERGLYCEMIA: Primary | ICD-10-CM

## 2020-09-28 DIAGNOSIS — E10.649 TYPE 1 DIABETES MELLITUS WITH HYPOGLYCEMIA AND WITHOUT COMA: ICD-10-CM

## 2020-09-28 PROCEDURE — 99999 PR PBB SHADOW E&M-EST. PATIENT-LVL IV: CPT | Mod: PBBFAC,,, | Performed by: NURSE PRACTITIONER

## 2020-09-28 PROCEDURE — 3046F PR MOST RECENT HEMOGLOBIN A1C LEVEL > 9.0%: ICD-10-PCS | Mod: CPTII,S$GLB,, | Performed by: NURSE PRACTITIONER

## 2020-09-28 PROCEDURE — 99204 PR OFFICE/OUTPT VISIT, NEW, LEVL IV, 45-59 MIN: ICD-10-PCS | Mod: S$GLB,,, | Performed by: NURSE PRACTITIONER

## 2020-09-28 PROCEDURE — 3008F BODY MASS INDEX DOCD: CPT | Mod: CPTII,S$GLB,, | Performed by: NURSE PRACTITIONER

## 2020-09-28 PROCEDURE — 3046F HEMOGLOBIN A1C LEVEL >9.0%: CPT | Mod: CPTII,S$GLB,, | Performed by: NURSE PRACTITIONER

## 2020-09-28 PROCEDURE — 99204 OFFICE O/P NEW MOD 45 MIN: CPT | Mod: S$GLB,,, | Performed by: NURSE PRACTITIONER

## 2020-09-28 PROCEDURE — 99999 PR PBB SHADOW E&M-EST. PATIENT-LVL IV: ICD-10-PCS | Mod: PBBFAC,,, | Performed by: NURSE PRACTITIONER

## 2020-09-28 PROCEDURE — 3008F PR BODY MASS INDEX (BMI) DOCUMENTED: ICD-10-PCS | Mod: CPTII,S$GLB,, | Performed by: NURSE PRACTITIONER

## 2020-09-28 RX ORDER — INSULIN ASPART INJECTION 100 [IU]/ML
INJECTION, SOLUTION SUBCUTANEOUS
Qty: 8 SYRINGE | Refills: 6 | Status: SHIPPED | OUTPATIENT
Start: 2020-09-28 | End: 2021-03-02

## 2020-09-28 RX ORDER — BLOOD-GLUCOSE SENSOR
EACH MISCELLANEOUS
Qty: 3 DEVICE | Refills: 11 | Status: SHIPPED | OUTPATIENT
Start: 2020-09-28

## 2020-09-28 RX ORDER — ATORVASTATIN CALCIUM 10 MG/1
10 TABLET, FILM COATED ORAL DAILY
COMMUNITY
End: 2021-04-22 | Stop reason: SDUPTHER

## 2020-09-28 RX ORDER — INSULIN DEGLUDEC 200 U/ML
30 INJECTION, SOLUTION SUBCUTANEOUS NIGHTLY
Qty: 3 SYRINGE | Refills: 6 | Status: SHIPPED | OUTPATIENT
Start: 2020-09-28 | End: 2021-03-02

## 2020-09-28 RX ORDER — BLOOD-GLUCOSE TRANSMITTER
EACH MISCELLANEOUS
Qty: 1 DEVICE | Refills: 4 | Status: SHIPPED | OUTPATIENT
Start: 2020-09-28

## 2020-09-28 RX ORDER — LOSARTAN POTASSIUM 25 MG/1
25 TABLET ORAL DAILY
COMMUNITY
End: 2021-02-08

## 2020-09-28 RX ORDER — GLUCAGON 3 MG/1
POWDER NASAL
Qty: 2 EACH | Refills: 1 | Status: SHIPPED | OUTPATIENT
Start: 2020-09-28

## 2020-09-28 NOTE — ASSESSMENT & PLAN NOTE
Uncontrolled.   Will get labs to confirm type 1 diabetes  She is interested in diabetes technology  Would like to start with personal CGMs. --the Dexcom sensor sample was applied to patient in clinic today.   If confirm type 1 diabetic--may consider insulin pump in the future after she learns to carbohydrate count      -- Medication Changes:   Change from Levemir and Novolog to Tresiba and Fiasp. Coupon cards provided   Until she starts Tresiba - continue Levemir 36 units daily   When she starts Tresiba - cut back to 30 units daily.   Fiasp 15 units TID AC   Start using correction scale goal 150, +1 --0 review with her today in clinic.     Moving forward with CHO counting in the future- ICR 1:8          -- Reviewed goals of therapy are to get the best control we can without hypoglycemia.  -- Reviewed patient's current insulin regimen. Clarified proper insulin dose and timing in relation to meals, etc. Insulin injection sites and proper rotation instructed.    -- Advised frequent self blood glucose monitoring.  Patient encouraged to document glucose results and bring them to every clinic visit. Continue to monitor 4 times per day. Wearing the Dexcom sample now. Will submit paperwork for the Dexcom.   -- Hypoglycemia precautions discussed. Instructed on precautions before driving.    -- Call for Bg repeatedly < 90 or > 180.   -- Close adherence to lifestyle changes recommended.   -- Periodic follow ups for eye evaluations, foot care and dental care suggested.  -- Refer to diabetes education- Dexcom download, start CHO counting

## 2020-09-28 NOTE — LETTER
September 28, 2020      Myrna Plaza MD  [address] [place] [address] [address] Omar [address] [id number]           [facility]  [address]  [place] [address]  Phone: [phone]  Fax: [phone]          Patient: Etta Bill   MR Number: [id number]   YOB: 1990   Date of Visit: 9/28/2020       Dear Dr. Myrna Plaza:    Thank you for referring Etta Bill to me for evaluation. Attached you will find relevant portions of my assessment and plan of care.    If you have questions, please do not hesitate to call me. I look forward to following Etta Bill along with you.    Sincerely,    Natalia Zuniga, ELMA    Enclosure  CC:  No Recipients    If you would like to receive this communication electronically, please contact [email or web address] or [phone] to request more information on [employer] Link access.    For providers and/or their staff who would like to refer a patient to [facility], please contact us through our one-stop-shop provider referral line, [facility], at [phone].    If you feel you have received this communication in error or would no longer like to receive these types of communications, please e-mail [email or web address]

## 2020-09-28 NOTE — PROGRESS NOTES
CC:   Chief Complaint   Patient presents with    Diabetes Mellitus     BG was 120 when she woke up and then 149 this am before breakfast       HPI: Etta Bill is a 30 y.o. female presents for an initial visit today for the management of T1DM. New onset T1DM.     She was diagnosed with Type 1 diabetes in September 2020 when she went into DKA and was hospitalized. She presented with fatigue, polyuria, polydipsia, nausea and vomiting. BG was 330 on chemistry panel. She was discharged home on insulin therapy ( MDI).   She does express some diabetes distress.   They did not draw C-peptide level or antibodies.  She is interested in diabetes technology.  Reports that the insulin has leaked out 2 times during injections.       Her primary care doctor started her on cholesterol medication an ARB.       Family hx of diabetes: grandmother   Hospitalized for diabetes: DKA at diagnosis.     No personal or FH of thyroid cancer or personal of pancreatic cancer or pancreatitis.     DIABETES COMPLICATIONS: none      Diabetes Management Status    ASA:  No    Statin: Taking - Lipitor   ACE/ARB: Taking -- cozaar     Screening or Prevention Patient's value Goal Complete/Controlled?   HgA1C Testing and Control   Lab Results   Component Value Date    HGBA1C 10.3 (H) 09/12/2020      Annually/Less than 8% No   Lipid profile : 09/12/2020 Annually Yes   LDL control Lab Results   Component Value Date    LDLCALC 134.4 09/12/2020    Annually/Less than 100 mg/dl  No   Nephropathy screening No results found for: LABMICR  Lab Results   Component Value Date    PROTEINUA Negative 09/12/2020    Annually Yes   Blood pressure BP Readings from Last 1 Encounters:   09/28/20 117/75    Less than 140/90 Yes   Dilated retinal exam Most Recent Eye Exam Date: Not Found Annually No   Foot exam   : 09/28/2020 Annually No       CURRENT A1C:    Hemoglobin A1C   Date Value Ref Range Status   09/12/2020 10.3 (H) 4.0 - 5.6 % Final     Comment:     ADA  Screening Guidelines:  5.7-6.4%  Consistent with prediabetes  >or=6.5%  Consistent with diabetes  High levels of fetal hemoglobin interfere with the HbA1C  assay. Heterozygous hemoglobin variants (HbS, HgC, etc)do  not significantly interfere with this assay.   However, presence of multiple variants may affect accuracy.         GOAL A1C: 6.5% without hypoglycemia     DM MEDICATIONS USED IN THE PAST: Levemir   Novolog     CURRENT DIABETES MEDICATIONS: Levemir 36 units nightly( discharged on 20 units) she has been titrating up on her Levemir based on fasting readings and Novolog 15 units TID AC   Insulin: pens vial and syringe on Novolog and Levemir pens -- she would like to just use pens.   Missed doses: denies       BLOOD GLUCOSE MONITORING:  She checks 4 times per day or more   Presents with logs from home for review.                              HYPOGLYCEMIA:yes- once down to 59 when she took her Novolog and didn't eat.   + possible hypoglycemia unawareness. Having bad night sweats that are waking her up from her sleep.       MEALS: eating 3 meals per day   BF: eggs with spinach, greek yogurt with strawberries   Lunch: salad with ranch/ greek yogurt-- turkey, cheese,  Dinner: salad - same as lunch   Snack: crackers and cheese and baby bell cheese or almonds and other nuts   Drinks: water   No sugary beverages        CURRENT EXERCISE:  No      Review of Systems  Review of Systems   Constitutional: Negative for appetite change, fatigue and unexpected weight change.   HENT: Negative for trouble swallowing.    Eyes: Negative for visual disturbance.   Respiratory: Negative for shortness of breath.    Cardiovascular: Negative for chest pain.   Gastrointestinal: Negative for nausea.   Endocrine: Negative for polydipsia, polyphagia and polyuria.   Genitourinary:        No Nocturia    Skin: Negative for wound.   Neurological: Negative for numbness.       Physical Exam   Physical Exam  Vitals signs and nursing note  reviewed.   Constitutional:       Appearance: She is well-developed.   HENT:      Head: Normocephalic and atraumatic.      Right Ear: External ear normal.      Left Ear: External ear normal.      Nose: Nose normal.   Neck:      Musculoskeletal: Normal range of motion and neck supple.      Thyroid: No thyromegaly.      Trachea: No tracheal deviation.   Cardiovascular:      Rate and Rhythm: Normal rate and regular rhythm.      Heart sounds: No murmur.   Pulmonary:      Effort: Pulmonary effort is normal. No respiratory distress.      Breath sounds: Normal breath sounds.   Abdominal:      Palpations: Abdomen is soft.      Tenderness: There is no abdominal tenderness.      Hernia: No hernia is present.   Skin:     General: Skin is warm and dry.      Capillary Refill: Capillary refill takes less than 2 seconds.      Findings: No rash.      Comments: Injection sites with scattered bruising. No lipo hypertropthy or atrophy     Neurological:      Mental Status: She is alert and oriented to person, place, and time.      Cranial Nerves: No cranial nerve deficit.   Psychiatric:         Behavior: Behavior normal.         Judgment: Judgment normal.           FOOT EXAMINATION: Appropriate footwear     Protective Sensation (w/ 10 gram monofilament):  Right: Intact  Left: Intact    Visual Inspection:  Normal -  Bilateral and Nails Intact - without Evidence of Foot Deformity- Bilateral    Pedal Pulses:   Right: Present  Left: Present    Posterior tibialis:   Right:Present  Left: Present           No results found for: TSH        Uncontrolled type 1 diabetes mellitus with hyperglycemia  Uncontrolled.   Will get labs to confirm type 1 diabetes  She is interested in diabetes technology  Would like to start with personal CGMs. --the Dexcom sensor sample was applied to patient in clinic today.   If confirm type 1 diabetic--may consider insulin pump in the future after she learns to carbohydrate count      -- Medication Changes:   Change  from Levemir and Novolog to Tresiba and Fiasp. Coupon cards provided   Until she starts Tresiba - continue Levemir 36 units daily   When she starts Tresiba - cut back to 30 units daily.   Fiasp 15 units TID AC   Start using correction scale goal 150, +1 --0 review with her today in clinic.     Moving forward with CHO counting in the future- ICR 1:8          -- Reviewed goals of therapy are to get the best control we can without hypoglycemia.  -- Reviewed patient's current insulin regimen. Clarified proper insulin dose and timing in relation to meals, etc. Insulin injection sites and proper rotation instructed.    -- Advised frequent self blood glucose monitoring.  Patient encouraged to document glucose results and bring them to every clinic visit. Continue to monitor 4 times per day. Wearing the Dexcom sample now. Will submit paperwork for the Dexcom.   -- Hypoglycemia precautions discussed. Instructed on precautions before driving.    -- Call for Bg repeatedly < 90 or > 180.   -- Close adherence to lifestyle changes recommended.   -- Periodic follow ups for eye evaluations, foot care and dental care suggested.  -- Refer to diabetes education- Dexcom download, start CHO counting         Type 1 diabetes mellitus with hypoglycemia and without coma  Reviewed hypoglycemia management: Treat with 1/2 glass of juice, 1/2 can regular coke, or 4 glucose tablets.   Monitor and repeat treatment every 15 minutes until BG is >70   Then have a snack, which includes a complex carbohydrate and protein.    - rx sent for Baqsimi   - possible hypoglycemia unawareness overnight. Will evaluate with Dexcom download in 7-10 days       Spent 65 minutes with patient with > 50% time spent in counseling on diagnosis, diet, carbohydrate counting, diabetes technology, diabetes to stress, pumps, sensors.   During her visit today a personal C GM dexcom was placed on patient and she was educated on the use.        Follow up in about 4 weeks  (around 10/26/2020).  Labs and urine this week   F/u with yifan in 7-10 days for Dexcom download   F/u with me in 4 weeks       Orders Placed This Encounter   Procedures    Glutamic acid decarboxylase     Standing Status:   Future     Standing Expiration Date:   3/28/2022    C-peptide     Standing Status:   Future     Standing Expiration Date:   3/28/2022    Anti-islet cell antibody     Standing Status:   Future     Standing Expiration Date:   11/27/2021    Glucose, fasting     Standing Status:   Future     Standing Expiration Date:   3/28/2022    Microalbumin/creatinine urine ratio     Standing Status:   Future     Standing Expiration Date:   3/28/2022     Order Specific Question:   Specimen Source     Answer:   Urine    Ambulatory referral/consult to Diabetes Education     Standing Status:   Future     Standing Expiration Date:   9/28/2021     Referral Priority:   Routine     Referral Type:   Consultation     Referral Reason:   Specialty Services Required     Referred to Provider:   Yifan Chiu RD, CDE     Requested Specialty:   Diabetes     Number of Visits Requested:   1       Recommendations were discussed with the patient in detail  The patient verbalized understanding and agrees with the plan outlined as above.     This note was partly generated with [employer] voice recognition software. I apologize for any possible typographical errors.

## 2020-09-28 NOTE — PATIENT INSTRUCTIONS
Hypoglycemia (Low Blood Sugar)     Fast-acting sugar includes a cup of nonfat milk.     Too little sugar (glucose) in your blood is called hypoglycemia or low blood sugar. Low blood sugar usually means anything lower than 70 mg/dL. Talk with your healthcare provider about your target range and what level is too low for you. Diabetes itself doesnt cause low blood sugar. But some of the treatments for diabetes, such as pills or insulin, may raise your risk for it. Low blood sugar may cause you to pass out or have a seizure. So always treat low blood sugar right away, but don't overeat.  Special note: Always carry a source of fast-acting sugar and a snack in case of hypoglycemia.   What you may notice  If you have low blood sugar, you may have one or more of these symptoms:  · Shakiness or dizziness  · Cold, clammy skin or sweating  · Feelings of hunger  · Headache  · Nervousness  · A hard, fast heartbeat  · Weakness  · Confusion or irritability  · Blurred vision  · Having nightmares or waking up confused or sweating  · Numbness or tingling in the lips or tongue  What you should do  Here are tips to follow if you have hypoglycemia:   · First check your blood sugar. If it is too low (out of your target range), eat or drink 15 to 20 grams of fast-acting sugar. This may be 3 to 4 glucose tablets, 4 ounces (half a cup) of fruit juice or regular (nondiet) soda, 8 ounces (1 cup) of fat-free milk, or 1 tablespoon of honey. Dont take more than this, or your blood sugar may go too high.  · Wait 15 minutes. Then recheck your blood sugar if you can.  · If your blood sugar is still too low, repeat the steps above and check your blood sugar again. If your blood sugar still has not returned to your target range, contact your healthcare provider or seek emergency care.  · Once your blood sugar returns to target range, eat a snack or meal.  Preventing low blood sugar  Things you can do include the following:   · If your  condition needs a strict treatment plan, eat your meals and snacks at the same times each day. Dont skip meals!  · If your treatment plan lets you change when you eat and what you eat, learn how to change the time and dose of your rapid-acting insulin to match this.   · Ask your healthcare provider if it is safe for you to drink alcohol. Never drink on an empty stomach.  · Take your medicine at the prescribed times.  · Always carry a source of fast-acting sugar and a snack when youre away from home.  Other things to do  Additional tips include the following:  · Carry a medical ID card, a compact USB drive, or wear a medical alert bracelet or necklace. It should say that you have diabetes. It should also say what to do if you pass out or have a seizure.  · Make sure your family, friends, and coworkers know the signs of low blood sugar. Tell them what to do if your blood sugar falls very low and you cant treat yourself.  · Keep a glucagon emergency kit handy. Be sure your family, friends, and coworkers know how and when to use it. Check it regularly and replace the glucagon before it expires.  · Talk with your health care team about other things you can do to prevent low blood sugar.     If you have unexplained hypoglycemia or hypoglycemia several times, call your healthcare provider.   Date Last Reviewed: 5/1/2016  © 5790-3158 [employer]. [address] [place] [address], [place] [address]. All rights reserved. This information is not intended as a substitute for professional medical care. Always follow your healthcare professional's instructions.         back, neck

## 2020-09-28 NOTE — ASSESSMENT & PLAN NOTE
Reviewed hypoglycemia management: Treat with 1/2 glass of juice, 1/2 can regular coke, or 4 glucose tablets.   Monitor and repeat treatment every 15 minutes until BG is >70   Then have a snack, which includes a complex carbohydrate and protein.    - rx sent for Baqsimi   - possible hypoglycemia unawareness overnight. Will evaluate with Dexcom download in 7-10 days

## 2020-09-29 ENCOUNTER — PATIENT MESSAGE (OUTPATIENT)
Dept: DIABETES | Facility: CLINIC | Age: 30
End: 2020-09-29

## 2020-10-05 ENCOUNTER — PATIENT MESSAGE (OUTPATIENT)
Dept: DIABETES | Facility: CLINIC | Age: 30
End: 2020-10-05

## 2020-10-05 DIAGNOSIS — E10.65 UNCONTROLLED TYPE 1 DIABETES MELLITUS WITH HYPERGLYCEMIA: ICD-10-CM

## 2020-10-05 DIAGNOSIS — E10.649 TYPE 1 DIABETES MELLITUS WITH HYPOGLYCEMIA AND WITHOUT COMA: Primary | ICD-10-CM

## 2020-10-05 RX ORDER — BLOOD SUGAR DIAGNOSTIC
STRIP MISCELLANEOUS
Qty: 150 EACH | Refills: 11 | Status: SHIPPED | OUTPATIENT
Start: 2020-10-05

## 2020-10-05 RX ORDER — SYRINGE AND NEEDLE,INSULIN,1ML 31GX15/64"
SYRINGE, EMPTY DISPOSABLE MISCELLANEOUS
Qty: 150 SYRINGE | Refills: 1 | Status: SHIPPED | OUTPATIENT
Start: 2020-10-05

## 2020-10-07 ENCOUNTER — CLINICAL SUPPORT (OUTPATIENT)
Dept: DIABETES | Facility: CLINIC | Age: 30
End: 2020-10-07
Payer: COMMERCIAL

## 2020-10-07 DIAGNOSIS — E10.65 UNCONTROLLED TYPE 1 DIABETES MELLITUS WITH HYPERGLYCEMIA: ICD-10-CM

## 2020-10-07 PROCEDURE — 95251 PR GLUCOSE MONITOR, 72 HOUR, PHYS INTERP: ICD-10-PCS | Mod: S$GLB,,, | Performed by: DIETITIAN, REGISTERED

## 2020-10-07 PROCEDURE — G0108 PR DIAB MANAGE TRN  PER INDIV: ICD-10-PCS | Mod: S$GLB,,, | Performed by: DIETITIAN, REGISTERED

## 2020-10-07 PROCEDURE — 95251 CONT GLUC MNTR ANALYSIS I&R: CPT | Mod: S$GLB,,, | Performed by: DIETITIAN, REGISTERED

## 2020-10-07 PROCEDURE — 99999 PR PBB SHADOW E&M-EST. PATIENT-LVL III: ICD-10-PCS | Mod: PBBFAC,,, | Performed by: DIETITIAN, REGISTERED

## 2020-10-07 PROCEDURE — G0108 DIAB MANAGE TRN  PER INDIV: HCPCS | Mod: S$GLB,,, | Performed by: DIETITIAN, REGISTERED

## 2020-10-07 PROCEDURE — 99999 PR PBB SHADOW E&M-EST. PATIENT-LVL III: CPT | Mod: PBBFAC,,, | Performed by: DIETITIAN, REGISTERED

## 2020-10-08 ENCOUNTER — PATIENT MESSAGE (OUTPATIENT)
Dept: DIABETES | Facility: CLINIC | Age: 30
End: 2020-10-08

## 2020-10-12 VITALS — BODY MASS INDEX: 34.04 KG/M2 | HEIGHT: 61 IN | WEIGHT: 180.31 LBS

## 2020-10-12 NOTE — PROGRESS NOTES
Diabetes Education  Author: Aretha Chiu RD, CDE  Date: 10/12/2020    Diabetes Care Management Summary  Diabetes Education Record Assessment/Progress: Comprehensive/Group  Current Diabetes Risk Level: High     Last A1c:   Lab Results   Component Value Date    HGBA1C 10.3 (H) 09/12/2020     Last Visit with Diabetes Educator: Last Education Visit: Not Found  Last OPCM Referral: : Not Found   Enrolled in OPCM: No      Diabetes Type  Diabetes Type : Type II(new diagnosis - was told Type 1 by hospital and type 2 by [facility])    Diabetes History  Diabetes Diagnosis: 0-1 year  Current Treatment: Insulin, Diet  Reviewed Problem List with Patient: Yes    Health Maintenance was reviewed today with patient. Discussed with patient importance of routine eye exams, foot exams/foot care, blood work (i.e.: A1c, microalbumin, and lipid), dental visits, yearly flu vaccine, and pneumonia vaccine as indicated by PCP. Patient verbalized understanding.     Health Maintenance Topics with due status: Not Due       Topic Last Completion Date    Lipid Panel 09/12/2020    Hemoglobin A1c 09/12/2020    Foot Exam 09/28/2020     Health Maintenance Due   Topic Date Due    Hepatitis C Screening  1988    Eye Exam  02/24/1998    HIV Screening  02/24/2003    TETANUS VACCINE  02/24/2006    Pneumococcal Vaccine (Medium Risk) (1 of 1 - PPSV23) 02/24/2007    Cervical Cancer Screening  02/24/2009    Influenza Vaccine (1) 08/01/2020                           Nutrition  Meal Planning: eats out seldom, snacks between meal, water, 3 meals per day  What type of sweetener do you use?: none  What type of beverages do you drink?: water  Meal Plan 24 Hour Recall - Breakfast: was previously not eating breakfast but now is eating a vegetarian omelet  Meal Plan 24 Hour Recall - Lunch: salad with okra  Meal Plan 24 Hour Recall - Dinner: cabbage with sausage and a salad  Meal Plan 24 Hour Recall - Snack: greek yogurt with strawberries, 4  triscuits with cheese, and almonds    Monitoring   Monitoring: Other  Self Monitoring : BS: 155, 166, 181, 188, 518, 294, 335, 230, 206, 211, 166, 293  Blood Glucose Logs: Yes  Do you use a personal continuous glucose monitor?: No  In the last month, how often have you had a low blood sugar reaction?: once  What are your symptoms of low blood sugar?: sweaty, weak, shaky  How do you treat low blood sugar?: juice, glucose tablets - currently hypoglycemic  Can you tell when your blood sugar is too high?: sometimes  How do you treat high blood sugar?: insulin    Exercise   Exercise Type: none  Frequency: Never    Current Diabetes Treatment   Current Treatment: Insulin, Diet    Social History  Preferred Learning Method: Face to Face, Hands On, Reading Materials  Primary Support: Self, Family  Educational Level: High School  Occupation: works at a law firm  Smoking Status: Never a Smoker  Alcohol Use: Rarely                                Barriers to Change  Barriers to Change: None  Learning Challenges : None    Readiness to Learn   Readiness to Learn : Acceptance    Cultural Influences  Cultural Influences: No    Diabetes Education Assessment/Progress  Diabetes Disease Process (diabetes disease process and treatment options): Individual Session, Instructed, Discussion, Comprehends Key Points  Nutrition (Incorporating nutritional management into one's lifestyle): Individual Session, Instructed, Discussion, Comprehends Key Points  Physical Activity (incorporating physical activity into one's lifestyle): Individual Session, Instructed, Discussion, Comprehends Key Points  Medications (states correct name, dose, onset, peak, duration, side effects & timing of meds): Individual Session, Instructed, Discussion, Comprehends Key Points  Monitoring (monitoring blood glucose/other parameters & using results): Individual Session, Instructed, Discussion, Comprehends Key Points, Demonstration, Return Demonstration, Demonstrates  Understanding/Competency (verbalizes/demonstrates)  Acute Complications (preventing, detecting, and treating acute complications): Individual Session, Instructed, Discussion, Comprehends Key Points  Chronic Complications (preventing, detecting, and treating chronic complications): Individual Session, Instructed, Discussion, Comprehends Key Points  Clinical (diabetes, other pertinent medical history, and relevant comorbidities reviewed during visit): Individual Session, Instructed, Discussion, Comprehends Key Points  Cognitive (knowledge of self-management skills, functional health literacy): Individual Session, Instructed, Discussion, Comprehends Key Points  Psychosocial (emotional response to diabetes): Individual Session, Written Materials Provided, Instructed, Comprehends Key Points, Discussion  Diabetes Distress and Support Systems: Individual Session, Instructed, Discussion, Comprehends Key Points  Behavioral (readiness for change, lifestyle practices, self-care behaviors): Individual Session, Instructed, Discussion, Comprehends Key Points    Goals  Patient has selected/evaluated goals during today's session: Yes, evaluated  Healthy Eating: In Progress  Physical Activity: In Progress         Diabetes Care Plan/Intervention  Education Plan/Intervention: Individual Follow-Up DSMT    Diabetes Meal Plan  Restrictions: Low Fat, Low Sodium, Restricted Carbohydrate  Calories: 1200  Carbohydrate Per Meal: 20-30g  Carbohydrate Per Snack : 7-15g  Fat: 35  Protein: 90    Today's Self-Management Care Plan was developed with the patient's input and is based on barriers identified during today's assessment.    The long and short-term goals in the care plan were written with the patient/caregiver's input. The patient has agreed to work toward these goals to improve her overall diabetes control.      The patient received a copy of today's self-management plan and verbalized understanding of the care plan, goals, and all of  today's instructions.      The patient was encouraged to communicate with her physician and care team regarding her condition(s) and treatment.  I provided the patient with my contact information today and encouraged her to contact me via phone or patient portal as needed.     Education Units of Time   Time Spent: 30 min

## 2020-10-16 ENCOUNTER — PATIENT MESSAGE (OUTPATIENT)
Dept: DIABETES | Facility: CLINIC | Age: 30
End: 2020-10-16

## 2020-10-26 ENCOUNTER — PATIENT MESSAGE (OUTPATIENT)
Dept: DIABETES | Facility: CLINIC | Age: 30
End: 2020-10-26

## 2020-10-27 ENCOUNTER — OFFICE VISIT (OUTPATIENT)
Dept: DIABETES | Facility: CLINIC | Age: 30
End: 2020-10-27
Payer: COMMERCIAL

## 2020-10-27 VITALS
DIASTOLIC BLOOD PRESSURE: 63 MMHG | WEIGHT: 187 LBS | SYSTOLIC BLOOD PRESSURE: 107 MMHG | OXYGEN SATURATION: 98 % | HEART RATE: 79 BPM | HEIGHT: 61 IN | BODY MASS INDEX: 35.3 KG/M2

## 2020-10-27 DIAGNOSIS — Z71.9 HEALTH EDUCATION/COUNSELING: ICD-10-CM

## 2020-10-27 DIAGNOSIS — E66.01 CLASS 2 SEVERE OBESITY DUE TO EXCESS CALORIES WITH SERIOUS COMORBIDITY AND BODY MASS INDEX (BMI) OF 35.0 TO 35.9 IN ADULT: ICD-10-CM

## 2020-10-27 DIAGNOSIS — E10.649 TYPE 1 DIABETES MELLITUS WITH HYPOGLYCEMIA AND WITHOUT COMA: ICD-10-CM

## 2020-10-27 DIAGNOSIS — E10.65 UNCONTROLLED TYPE 1 DIABETES MELLITUS WITH HYPERGLYCEMIA: Primary | ICD-10-CM

## 2020-10-27 PROBLEM — E66.812 CLASS 2 SEVERE OBESITY DUE TO EXCESS CALORIES WITH SERIOUS COMORBIDITY AND BODY MASS INDEX (BMI) OF 35.0 TO 35.9 IN ADULT: Status: ACTIVE | Noted: 2020-10-27

## 2020-10-27 PROCEDURE — 3008F PR BODY MASS INDEX (BMI) DOCUMENTED: ICD-10-PCS | Mod: CPTII,S$GLB,, | Performed by: NURSE PRACTITIONER

## 2020-10-27 PROCEDURE — 3046F PR MOST RECENT HEMOGLOBIN A1C LEVEL > 9.0%: ICD-10-PCS | Mod: CPTII,S$GLB,, | Performed by: NURSE PRACTITIONER

## 2020-10-27 PROCEDURE — 95251 CONT GLUC MNTR ANALYSIS I&R: CPT | Mod: S$GLB,,, | Performed by: NURSE PRACTITIONER

## 2020-10-27 PROCEDURE — 3046F HEMOGLOBIN A1C LEVEL >9.0%: CPT | Mod: CPTII,S$GLB,, | Performed by: NURSE PRACTITIONER

## 2020-10-27 PROCEDURE — 99214 OFFICE O/P EST MOD 30 MIN: CPT | Mod: S$GLB,,, | Performed by: NURSE PRACTITIONER

## 2020-10-27 PROCEDURE — 99999 PR PBB SHADOW E&M-EST. PATIENT-LVL V: ICD-10-PCS | Mod: PBBFAC,,, | Performed by: NURSE PRACTITIONER

## 2020-10-27 PROCEDURE — 99214 PR OFFICE/OUTPT VISIT, EST, LEVL IV, 30-39 MIN: ICD-10-PCS | Mod: S$GLB,,, | Performed by: NURSE PRACTITIONER

## 2020-10-27 PROCEDURE — 3008F BODY MASS INDEX DOCD: CPT | Mod: CPTII,S$GLB,, | Performed by: NURSE PRACTITIONER

## 2020-10-27 PROCEDURE — 99999 PR PBB SHADOW E&M-EST. PATIENT-LVL V: CPT | Mod: PBBFAC,,, | Performed by: NURSE PRACTITIONER

## 2020-10-27 PROCEDURE — 95251 PR GLUCOSE MONITOR, 72 HOUR, PHYS INTERP: ICD-10-PCS | Mod: S$GLB,,, | Performed by: NURSE PRACTITIONER

## 2020-10-27 NOTE — PROGRESS NOTES
CC:   Chief Complaint   Patient presents with    Diabetes Mellitus       HPI: Etta Bill is a 30 y.o. female presents for a follow up visit today for the management of T1DM.      She was diagnosed with Type 1 diabetes in September 2020 when she went into DKA and was hospitalized. She presented with fatigue, polyuria, polydipsia, nausea and vomiting. BG was 330 on chemistry panel. She was discharged home on insulin therapy ( MDI).   9/30/2020--LYDIA and anti islet cell antibodies--both positive.  C-peptide low at 0.53 with a fasting glucose of 243--confirms the diagnosis of type 1 diabetes      She presents today for a 4 week follow-up.  She was last seen by me at the end of September.  Following that visit we started her on the Dexcom personal C GM.   We also changed her Levemir and NovoLog to Tresiba and Fiasp.   She is interested in diabetes technology.  We discussed carbohydrate counting moving forward. She is still using Set doses.   + BG variability seen on dexcom download.  She ran high overnight last night after having red beans and rice.  Prior to read beans and rice she had an episode of hypoglycemia where she dropped into almost 50s--she took her Fiaso and didn't eat right away.   She is interested in CHO counting and insulin pumps.   She is frustrated with waking    Today in clinic on her Dexcom her BG is 78-89     Her primary care doctor started her on cholesterol medication an ARB.       Family hx of diabetes: grandmother   Hospitalized for diabetes: DKA at diagnosis.     No personal or FH of thyroid cancer or personal of pancreatic cancer or pancreatitis.     DIABETES COMPLICATIONS: none      Diabetes Management Status    ASA:  No    Statin: Taking - Lipitor   ACE/ARB: Taking -- cozaar     Screening or Prevention Patient's value Goal Complete/Controlled?   HgA1C Testing and Control   Lab Results   Component Value Date    HGBA1C 10.3 (H) 09/12/2020      Annually/Less than 8% No   Lipid  profile : 2020 Annually Yes   LDL control Lab Results   Component Value Date    LDLCALC 134.4 2020    Annually/Less than 100 mg/dl  No   Nephropathy screening Lab Results   Component Value Date    LABMICR 3.0 2020     Lab Results   Component Value Date    PROTEINUA Negative 2020    Annually Yes   Blood pressure BP Readings from Last 1 Encounters:   10/27/20 107/63    Less than 140/90 Yes   Dilated retinal exam Most Recent Eye Exam Date: Not Found-- due for eye exam  Annually No   Foot exam   : 2020 Annually No       CURRENT A1C:    Hemoglobin A1C   Date Value Ref Range Status   2020 10.3 (H) 4.0 - 5.6 % Final     Comment:     ADA Screening Guidelines:  5.7-6.4%  Consistent with prediabetes  >or=6.5%  Consistent with diabetes  High levels of fetal hemoglobin interfere with the HbA1C  assay. Heterozygous hemoglobin variants (HbS, HgC, etc)do  not significantly interfere with this assay.   However, presence of multiple variants may affect accuracy.         GOAL A1C: 6.5% without hypoglycemia     DM MEDICATIONS USED IN THE PAST: Levemir   Novolog   Tresiba   Fiasp     CURRENT DIABETES MEDICATIONS: Tresiba 32 units nightly ( 9PM)   Fiasp 15 units TID AC + correction scale goal 150, +1   Insulin: pens.   Missed doses: denies       BLOOD GLUCOSE MONITORING:    Sensor type: Dexcom G6   Average BG readin  Time in range: 53%  Limits:   Site change:q10 days    Dexcom supplies from- pumps     Sensor was downloaded in clinic today and reviewed with patient.   Please see attached document for download.         HYPOGLYCEMIA:  1% seen on dexcom--- taking her Fiasp and not eating.   Has Baqsimi nasal spray   + possible hypoglycemia unawareness. Having bad night sweats that are waking her up from her sleep.       MEALS: eating 3 meals per day   BF: eggs with spinach, greek yogurt with strawberries   Lunch: salad with ranch/ greek yogurt-- turkey, cheese,  Dinner: salad - same as lunch   Or red beans and rice   Snack: crackers and cheese and baby bell cheese or almonds and other nuts   Drinks: water   No sugary beverages        CURRENT EXERCISE:  No      Review of Systems  Review of Systems   Constitutional: Negative for appetite change, fatigue and unexpected weight change.   HENT: Negative for trouble swallowing.    Eyes: Negative for visual disturbance.   Respiratory: Negative for shortness of breath.    Cardiovascular: Negative for chest pain.   Gastrointestinal: Negative for nausea.   Endocrine: Negative for polydipsia, polyphagia and polyuria.   Genitourinary:        No Nocturia    Skin: Negative for wound.   Neurological: Negative for numbness.       Physical Exam   Physical Exam  Vitals signs and nursing note reviewed.   Constitutional:       Appearance: She is well-developed. She is obese.   HENT:      Head: Normocephalic and atraumatic.      Right Ear: External ear normal.      Left Ear: External ear normal.      Nose: Nose normal.   Neck:      Musculoskeletal: Normal range of motion and neck supple.      Thyroid: No thyromegaly.      Trachea: No tracheal deviation.   Cardiovascular:      Rate and Rhythm: Normal rate and regular rhythm.      Heart sounds: No murmur.   Pulmonary:      Effort: Pulmonary effort is normal. No respiratory distress.      Breath sounds: Normal breath sounds.   Abdominal:      Palpations: Abdomen is soft.      Tenderness: There is no abdominal tenderness.      Hernia: No hernia is present.   Skin:     General: Skin is warm and dry.      Capillary Refill: Capillary refill takes less than 2 seconds.      Findings: No rash.      Comments: Injection sites with scattered bruising. No lipo hypertropthy or atrophy     Neurological:      Mental Status: She is alert and oriented to person, place, and time.      Cranial Nerves: No cranial nerve deficit.   Psychiatric:         Behavior: Behavior normal.         Judgment: Judgment normal.           FOOT EXAMINATION:  Appropriate footwear       No results found for: TSH        Uncontrolled type 1 diabetes mellitus with hyperglycemia  Uncontrolled.   Labs confirm type 1 diabetes.   She is interested in diabetes technology-- would like to move forward with insulin pump. Will have her meet with DM education for insulin pump evaluation.   We will start CHO counting   Reviewed extensively during visit today on CHO counting       -- Medication Changes:   Continue Tresiba 32 units nightly   Change Fiasp to ICR- 1:8 TID AC and snacks   Discussed apps such as [employer] and [employer]  Target 120  ISF 1:30  Discussed covering all snacks with Fiasp.   Use the correction factor on pre meal BG readings ( at least 4 hours since eating)       -- Reviewed goals of therapy are to get the best control we can without hypoglycemia.  -- Reviewed patient's current insulin regimen. Clarified proper insulin dose and timing in relation to meals, etc. Insulin injection sites and proper rotation instructed.    -- Advised frequent self blood glucose monitoring.  Patient encouraged to document glucose results and bring them to every clinic visit. Continue to use Dexcom   -- Hypoglycemia precautions discussed. Instructed on precautions before driving.    -- Call for Bg repeatedly < 90 or > 180.   -- Close adherence to lifestyle changes recommended.   -- Periodic follow ups for eye evaluations, foot care and dental care suggested.  -- Refer to diabetes education- Dexcom download and review CHO counting -- see if we need to tighten ratio and Insulin pump evaluation. She would benefit from the Tandem pump     Referral for eye exam   Encouraged flu vaccine        Type 1 diabetes mellitus with hypoglycemia and without coma  Reviewed hypoglycemia management: Treat with 1/2 glass of juice, 1/2 can regular coke, or 4 glucose tablets.   Monitor and repeat treatment every 15 minutes until BG is >70   Then have a snack, which includes a complex carbohydrate and  protein.    - has Baqsimi     --discussed not taking her Fiasp and skipping the meal. She must take the Fiasp and eat.   -- will likely improve with carbohydrate counting    Class 2 severe obesity due to excess calories with serious comorbidity and body mass index (BMI) of 35.0 to 35.9 in adult  Body mass index is 35.33 kg/m².  Increases insulin resistance.   Discussed DM diet and exercise.       Spent 35 minutes with patient with > 50% time spent in counseling on CHO counting, correction factor, insulin pumps, diet       Follow up in about 9 weeks (around 12/29/2020).  Referral to diabetes education -- next 1-2 weeks for insulin pump evaluation and CHO counting evaluation.   Follow up with me in 9 weeks .   Schedule eye exam please- Dr. Alas or Dr. Joe       Orders Placed This Encounter   Procedures    Ambulatory referral/consult to Diabetes Education     Standing Status:   Future     Standing Expiration Date:   10/27/2021     Referral Priority:   Routine     Referral Type:   Consultation     Referral Reason:   Specialty Services Required     Referred to Provider:   Aretha Chiu RD, CDE     Requested Specialty:   Diabetes     Number of Visits Requested:   1    Ambulatory referral/consult to Optometry     Standing Status:   Future     Standing Expiration Date:   11/27/2021     Referral Priority:   Routine     Referral Type:   Vision (Optometry)     Referral Reason:   Specialty Services Required     Requested Specialty:   Optometry     Number of Visits Requested:   1       Recommendations were discussed with the patient in detail  The patient verbalized understanding and agrees with the plan outlined as above.     This note was partly generated with [employer] voice recognition software. I apologize for any possible typographical errors.

## 2020-10-27 NOTE — ASSESSMENT & PLAN NOTE
Body mass index is 35.33 kg/m².  Increases insulin resistance.   Discussed DM diet and exercise.

## 2020-10-27 NOTE — PATIENT INSTRUCTIONS
This is how much insulin you are taking with your meals   Insulin to carbohydrate ratio--- you will take 1 unit of Fiasp insulin for every 8 grams of carbohydrates that you are eating.   So you will add up the total number of carbohydrates and then divide by 8  That will be how many units of insulin ( Fiasp) you will give for the meal       This is your correction:  You will use a 1:30 correction factor.   This means 1 units of Fiasp will drop you 30 points.   Your target is 120 ( goal)     You will subtract you current blood PRE MEAL blood sugar readings and subtract 120 from it and then divide by 30-- this is how many units of insulin you need to take to correct your blood sugar elevation.       Continue Tresiba 32 units nightly

## 2020-10-27 NOTE — ASSESSMENT & PLAN NOTE
Uncontrolled.   Labs confirm type 1 diabetes.   She is interested in diabetes technology-- would like to move forward with insulin pump. Will have her meet with DM education for insulin pump evaluation.   We will start CHO counting   Reviewed extensively during visit today on CHO counting       -- Medication Changes:   Continue Tresiba 32 units nightly   Change Fiasp to ICR- 1:8 TID AC and snacks   Discussed apps such as [employer] and [employer]  Target 120  ISF 1:30  Discussed covering all snacks with Fiasp.   Use the correction factor on pre meal BG readings ( at least 4 hours since eating)       -- Reviewed goals of therapy are to get the best control we can without hypoglycemia.  -- Reviewed patient's current insulin regimen. Clarified proper insulin dose and timing in relation to meals, etc. Insulin injection sites and proper rotation instructed.    -- Advised frequent self blood glucose monitoring.  Patient encouraged to document glucose results and bring them to every clinic visit. Continue to use Dexcom   -- Hypoglycemia precautions discussed. Instructed on precautions before driving.    -- Call for Bg repeatedly < 90 or > 180.   -- Close adherence to lifestyle changes recommended.   -- Periodic follow ups for eye evaluations, foot care and dental care suggested.  -- Refer to diabetes education- Dexcom download and review CHO counting -- see if we need to tighten ratio and Insulin pump evaluation. She would benefit from the Tandem pump     Referral for eye exam   Encouraged flu vaccine

## 2020-10-27 NOTE — ASSESSMENT & PLAN NOTE
Reviewed hypoglycemia management: Treat with 1/2 glass of juice, 1/2 can regular coke, or 4 glucose tablets.   Monitor and repeat treatment every 15 minutes until BG is >70   Then have a snack, which includes a complex carbohydrate and protein.    - has Baqsimi     --discussed not taking her Fiasp and skipping the meal. She must take the Fiasp and eat.   -- will likely improve with carbohydrate counting

## 2020-11-06 ENCOUNTER — PATIENT MESSAGE (OUTPATIENT)
Dept: DIABETES | Facility: CLINIC | Age: 30
End: 2020-11-06

## 2020-11-10 ENCOUNTER — PATIENT MESSAGE (OUTPATIENT)
Dept: DIABETES | Facility: CLINIC | Age: 30
End: 2020-11-10

## 2020-11-12 ENCOUNTER — CLINICAL SUPPORT (OUTPATIENT)
Dept: DIABETES | Facility: CLINIC | Age: 30
End: 2020-11-12
Payer: COMMERCIAL

## 2020-11-12 ENCOUNTER — PATIENT MESSAGE (OUTPATIENT)
Dept: DIABETES | Facility: CLINIC | Age: 30
End: 2020-11-12

## 2020-11-12 DIAGNOSIS — E10.65 UNCONTROLLED TYPE 1 DIABETES MELLITUS WITH HYPERGLYCEMIA: ICD-10-CM

## 2020-11-12 DIAGNOSIS — E10.649 TYPE 1 DIABETES MELLITUS WITH HYPOGLYCEMIA AND WITHOUT COMA: Primary | ICD-10-CM

## 2020-11-12 DIAGNOSIS — E10.649 TYPE 1 DIABETES MELLITUS WITH HYPOGLYCEMIA UNAWARENESS: ICD-10-CM

## 2020-11-12 PROCEDURE — 99999 PR PBB SHADOW E&M-EST. PATIENT-LVL III: CPT | Mod: PBBFAC,,, | Performed by: DIETITIAN, REGISTERED

## 2020-11-12 PROCEDURE — G0108 PR DIAB MANAGE TRN  PER INDIV: ICD-10-PCS | Mod: S$GLB,,, | Performed by: DIETITIAN, REGISTERED

## 2020-11-12 PROCEDURE — G0108 DIAB MANAGE TRN  PER INDIV: HCPCS | Mod: S$GLB,,, | Performed by: DIETITIAN, REGISTERED

## 2020-11-12 PROCEDURE — 99999 PR PBB SHADOW E&M-EST. PATIENT-LVL III: ICD-10-PCS | Mod: PBBFAC,,, | Performed by: DIETITIAN, REGISTERED

## 2020-11-15 VITALS — HEIGHT: 61 IN | BODY MASS INDEX: 35.29 KG/M2 | WEIGHT: 186.94 LBS

## 2020-11-15 NOTE — PROGRESS NOTES
Diabetes Education  Author: Aretha Chiu RD, CDE  Date: 11/15/2020    Diabetes Care Management Summary  Diabetes Education Record Assessment/Progress: Comprehensive/Group  Current Diabetes Risk Level: High     Last A1c:   Lab Results   Component Value Date    HGBA1C 10.3 (H) 09/12/2020     Last Visit with Diabetes Educator: Last Education Visit: Not Found  Last OPCM Referral: : Not Found   Enrolled in OPCM: No      Diabetes Type  Diabetes Type : Type II(new diagnosis - was told Type 1 by hospital and type 2 by [facility])    Diabetes History  Diabetes Diagnosis: 0-1 year  Current Treatment: Diet, Exercise, Insulin  Reviewed Problem List with Patient: Yes    Health Maintenance was reviewed today with patient. Discussed with patient importance of routine eye exams, foot exams/foot care, blood work (i.e.: A1c, microalbumin, and lipid), dental visits, yearly flu vaccine, and pneumonia vaccine as indicated by PCP. Patient verbalized understanding.     Health Maintenance Topics with due status: Not Due       Topic Last Completion Date    Lipid Panel 09/12/2020    Hemoglobin A1c 09/12/2020    Foot Exam 09/28/2020     Health Maintenance Due   Topic Date Due    Hepatitis C Screening  1990    Eye Exam  02/24/1998    HIV Screening  02/24/2003    TETANUS VACCINE  02/24/2006    Pneumococcal Vaccine (Medium Risk) (1 of 1 - PPSV23) 02/24/2007    Cervical Cancer Screening  02/24/2009    Influenza Vaccine (1) 08/01/2020       Nutrition  Meal Planning: 3 meals per day, water, eats out seldom, snacks between meal  What type of sweetener do you use?: none  What type of beverages do you drink?: water  Meal Plan 24 Hour Recall - Breakfast: was previously not eating breakfast but now is eating a vegetarian omelet  Meal Plan 24 Hour Recall - Lunch: salad with okra  Meal Plan 24 Hour Recall - Dinner: cabbage with sausage and a salad  Meal Plan 24 Hour Recall - Snack: greek yogurt with strawberries, 4 triscuits  with cheese, and almonds    Monitoring   Self Monitoring : using Dexcom G6  Blood Glucose Logs: Yes  Do you use a personal continuous glucose monitor?: Yes  What kind of glucose monitor do you use?: Dexcom  In the last month, how often have you had a low blood sugar reaction?: more than once a week  What are your symptoms of low blood sugar?: weak, shaky  How do you treat low blood sugar?: glucose tablets, juice, candy  Can you tell when your blood sugar is too high?: sometimes  How do you treat high blood sugar?: insulin    Exercise   Exercise Type: none  Frequency: Never    Current Diabetes Treatment   Current Treatment: Diet, Exercise, Insulin    Social History  Preferred Learning Method: Face to Face  Primary Support: Self, Family  Educational Level: High School  Occupation: works at a law firm  Smoking Status: Never a Smoker  Alcohol Use: Rarely                                Barriers to Change  Barriers to Change: None  Learning Challenges : None    Readiness to Learn   Readiness to Learn : Acceptance    Cultural Influences  Cultural Influences: No    Diabetes Education Assessment/Progress  Diabetes Disease Process (diabetes disease process and treatment options): Individual Session, Instructed, Discussion, Comprehends Key Points  Nutrition (Incorporating nutritional management into one's lifestyle): Individual Session, Instructed, Discussion, Comprehends Key Points, Written Materials Provided, Demonstration, Demonstrates Understanding/Competency (verbalizes/demonstrates), Return Demonstration, Needs Review  Physical Activity (incorporating physical activity into one's lifestyle): Individual Session, Instructed, Discussion, Comprehends Key Points  Medications (states correct name, dose, onset, peak, duration, side effects & timing of meds): Individual Session, Instructed, Discussion, Comprehends Key Points, Written Materials Provided, Demonstration, Demonstrates  Understanding/Competency(verbalizes/demonstrates), Needs Review  Monitoring (monitoring blood glucose/other parameters & using results): Individual Session, Instructed, Discussion, Comprehends Key Points, Demonstration, Return Demonstration, Demonstrates Understanding/Competency (verbalizes/demonstrates)  Acute Complications (preventing, detecting, and treating acute complications): Individual Session, Instructed, Discussion, Comprehends Key Points  Chronic Complications (preventing, detecting, and treating chronic complications): Individual Session, Instructed, Discussion, Comprehends Key Points  Clinical (diabetes, other pertinent medical history, and relevant comorbidities reviewed during visit): Individual Session, Instructed, Discussion, Comprehends Key Points  Cognitive (knowledge of self-management skills, functional health literacy): Individual Session, Instructed, Discussion, Comprehends Key Points  Psychosocial (emotional response to diabetes): Individual Session, Written Materials Provided, Instructed, Comprehends Key Points, Discussion  Diabetes Distress and Support Systems: Individual Session, Instructed, Discussion, Comprehends Key Points  Behavioral (readiness for change, lifestyle practices, self-care behaviors): Individual Session, Instructed, Discussion, Comprehends Key Points  Insulin Pump Education  Discussed Medtronic 630G and 670G Pumps and the Guardian Sensor; Tandem T-Slim and possibility of connecting with Dexcom G6; and OmniPod /OmniPod Dash.   Patient educated on insulin pump therapy, the purpose of insulin pump therapy, advantages and disadvantages of insulin pump therapy and/vs multiple daily injections, what a basal rate and bolus dose are, when to change infusion site and tubing, demonstrated how to prepare the syringe/cartridge and tubing (except for OmniPod) for use in the pump  Discussed ease of usage, infusion sets, communication with the sensor, basal and bolus, carbohydrate to  insulin ration, correction factors, approved areas to insert set, carbohydrate counting, error messages, how to disconnect and reconnect the cartridge and tubing   Patient instructed that based on current insulin regimen she would be changing infusion set daily.    Goals  Patient has selected/evaluated goals during today's session: Yes, evaluated  Healthy Eating: In Progress  Physical Activity: In Progress         Diabetes Care Plan/Intervention  Education Plan/Intervention: Individual Follow-Up DSMT    Diabetes Meal Plan  Restrictions: Low Fat, Low Sodium, Restricted Carbohydrate  Calories: 1200  Carbohydrate Per Meal: 20-30g  Carbohydrate Per Snack : 7-15g  Fat: 35  Protein: 90    Today's Self-Management Care Plan was developed with the patient's input and is based on barriers identified during today's assessment.    The long and short-term goals in the care plan were written with the patient/caregiver's input. The patient has agreed to work toward these goals to improve her overall diabetes control.      The patient received a copy of today's self-management plan and verbalized understanding of the care plan, goals, and all of today's instructions.      The patient was encouraged to communicate with her physician and care team regarding her condition(s) and treatment.  I provided the patient with my contact information today and encouraged her to contact me via phone or patient portal as needed.     Education Units of Time   Time Spent: 60 min

## 2020-11-24 ENCOUNTER — PATIENT MESSAGE (OUTPATIENT)
Dept: DIABETES | Facility: CLINIC | Age: 30
End: 2020-11-24

## 2020-11-24 DIAGNOSIS — L65.9 HAIR LOSS: Primary | ICD-10-CM

## 2020-11-24 NOTE — TELEPHONE ENCOUNTER
Hey,   Did you submit paperwork for an insulin pump for her?  See attached message from her.   Thanks,   Natalia

## 2020-12-09 ENCOUNTER — TELEPHONE (OUTPATIENT)
Dept: OPTOMETRY | Facility: CLINIC | Age: 30
End: 2020-12-09

## 2020-12-10 ENCOUNTER — OFFICE VISIT (OUTPATIENT)
Dept: OPTOMETRY | Facility: CLINIC | Age: 30
End: 2020-12-10
Payer: COMMERCIAL

## 2020-12-10 DIAGNOSIS — E10.65 UNCONTROLLED TYPE 1 DIABETES MELLITUS WITH HYPERGLYCEMIA: Primary | ICD-10-CM

## 2020-12-10 PROCEDURE — 99999 PR PBB SHADOW E&M-EST. PATIENT-LVL III: ICD-10-PCS | Mod: PBBFAC,,, | Performed by: OPTOMETRIST

## 2020-12-10 PROCEDURE — 92004 COMPRE OPH EXAM NEW PT 1/>: CPT | Mod: S$GLB,,, | Performed by: OPTOMETRIST

## 2020-12-10 PROCEDURE — 2023F PR DILATED RETINAL EXAM W/O EVID OF RETINOPATHY: ICD-10-PCS | Mod: S$GLB,,, | Performed by: OPTOMETRIST

## 2020-12-10 PROCEDURE — 2023F DILAT RTA XM W/O RTNOPTHY: CPT | Mod: S$GLB,,, | Performed by: OPTOMETRIST

## 2020-12-10 PROCEDURE — 99999 PR PBB SHADOW E&M-EST. PATIENT-LVL III: CPT | Mod: PBBFAC,,, | Performed by: OPTOMETRIST

## 2020-12-10 PROCEDURE — 92004 PR EYE EXAM, NEW PATIENT,COMPREHESV: ICD-10-PCS | Mod: S$GLB,,, | Performed by: OPTOMETRIST

## 2020-12-10 NOTE — PROGRESS NOTES
HPI     Annual diabetic eye exam  Hemoglobin A1C       Date                     Value               Ref Range             Status                09/12/2020               10.3 (H)            4.0 - 5.6 %           Final                Last edited by Radha Dill, PCT on 12/10/2020  2:14 PM. (History)            Assessment /Plan     For exam results, see Encounter Report.    Uncontrolled type 1 diabetes mellitus with hyperglycemia  -     Ambulatory referral/consult to Optometry  -No retinopathy noted today.  Continued control with primary care physician and annual comprehensive eye exam.      RTC 1 yr

## 2020-12-10 NOTE — LETTER
December 10, 2020      Natalia Zuniga, ELMA  [address]  [address]  [place] [address]           Natanael Berry - Optometry 1st Fl  [address]  [facility] [address]  Phone: [phone]  Fax: [phone]          Patient: Etta Bill   MR Number: [id number]   YOB: 1990   Date of Visit: 12/10/2020       Dear Natalia Zuniga:    Thank you for referring Etta Bill to me for evaluation. Attached you will find relevant portions of my assessment and plan of care.    If you have questions, please do not hesitate to call me. I look forward to following Etta Bill along with you.    Sincerely,    Javier Alas, OD    Enclosure  CC:  No Recipients    If you would like to receive this communication electronically, please contact [email or web address] or [phone] to request more information on [employer] access.    For providers and/or their staff who would like to refer a patient to [facility], please contact us through our one-stop-shop provider referral line, [facility] , at [phone].    If you feel you have received this communication in error or would no longer like to receive these types of communications, please e-mail [email or web address]

## 2020-12-15 ENCOUNTER — PATIENT MESSAGE (OUTPATIENT)
Dept: DIABETES | Facility: CLINIC | Age: 30
End: 2020-12-15

## 2020-12-15 DIAGNOSIS — E10.649 TYPE 1 DIABETES MELLITUS WITH HYPOGLYCEMIA AND WITHOUT COMA: Primary | ICD-10-CM

## 2020-12-15 DIAGNOSIS — E10.65 UNCONTROLLED TYPE 1 DIABETES MELLITUS WITH HYPERGLYCEMIA: ICD-10-CM

## 2020-12-16 ENCOUNTER — PATIENT MESSAGE (OUTPATIENT)
Dept: DIABETES | Facility: CLINIC | Age: 30
End: 2020-12-16

## 2020-12-28 ENCOUNTER — PATIENT MESSAGE (OUTPATIENT)
Dept: DIABETES | Facility: CLINIC | Age: 30
End: 2020-12-28

## 2020-12-28 DIAGNOSIS — E55.9 VITAMIN D INSUFFICIENCY: ICD-10-CM

## 2020-12-28 DIAGNOSIS — R79.89 ELEVATED TSH: Primary | ICD-10-CM

## 2020-12-29 RX ORDER — ERGOCALCIFEROL 1.25 MG/1
50000 CAPSULE ORAL
Qty: 12 CAPSULE | Refills: 3 | Status: SHIPPED | OUTPATIENT
Start: 2020-12-29 | End: 2021-04-22

## 2020-12-30 ENCOUNTER — PATIENT MESSAGE (OUTPATIENT)
Dept: DIABETES | Facility: CLINIC | Age: 30
End: 2020-12-30

## 2021-01-05 ENCOUNTER — PATIENT MESSAGE (OUTPATIENT)
Dept: DIABETES | Facility: CLINIC | Age: 31
End: 2021-01-05

## 2021-01-29 ENCOUNTER — PATIENT MESSAGE (OUTPATIENT)
Dept: DIABETES | Facility: CLINIC | Age: 31
End: 2021-01-29

## 2021-02-08 ENCOUNTER — OFFICE VISIT (OUTPATIENT)
Dept: DIABETES | Facility: CLINIC | Age: 31
End: 2021-02-08
Payer: COMMERCIAL

## 2021-02-08 VITALS
HEIGHT: 61 IN | OXYGEN SATURATION: 97 % | SYSTOLIC BLOOD PRESSURE: 117 MMHG | TEMPERATURE: 98 F | BODY MASS INDEX: 36.06 KG/M2 | DIASTOLIC BLOOD PRESSURE: 82 MMHG | RESPIRATION RATE: 16 BRPM | HEART RATE: 94 BPM | WEIGHT: 191 LBS

## 2021-02-08 DIAGNOSIS — E55.9 VITAMIN D DEFICIENCY: ICD-10-CM

## 2021-02-08 DIAGNOSIS — Z71.9 HEALTH EDUCATION/COUNSELING: ICD-10-CM

## 2021-02-08 DIAGNOSIS — E10.649 TYPE 1 DIABETES MELLITUS WITH HYPOGLYCEMIA AND WITHOUT COMA: ICD-10-CM

## 2021-02-08 DIAGNOSIS — E10.65 UNCONTROLLED TYPE 1 DIABETES MELLITUS WITH HYPERGLYCEMIA: Primary | ICD-10-CM

## 2021-02-08 DIAGNOSIS — R79.89 ELEVATED TSH: ICD-10-CM

## 2021-02-08 DIAGNOSIS — E66.01 CLASS 2 SEVERE OBESITY DUE TO EXCESS CALORIES WITH SERIOUS COMORBIDITY AND BODY MASS INDEX (BMI) OF 35.0 TO 35.9 IN ADULT: ICD-10-CM

## 2021-02-08 PROCEDURE — 1126F AMNT PAIN NOTED NONE PRSNT: CPT | Mod: S$GLB,,, | Performed by: NURSE PRACTITIONER

## 2021-02-08 PROCEDURE — 1126F PR PAIN SEVERITY QUANTIFIED, NO PAIN PRESENT: ICD-10-PCS | Mod: S$GLB,,, | Performed by: NURSE PRACTITIONER

## 2021-02-08 PROCEDURE — 95251 CONT GLUC MNTR ANALYSIS I&R: CPT | Mod: S$GLB,,, | Performed by: NURSE PRACTITIONER

## 2021-02-08 PROCEDURE — 99214 OFFICE O/P EST MOD 30 MIN: CPT | Mod: S$GLB,,, | Performed by: NURSE PRACTITIONER

## 2021-02-08 PROCEDURE — 99214 PR OFFICE/OUTPT VISIT, EST, LEVL IV, 30-39 MIN: ICD-10-PCS | Mod: S$GLB,,, | Performed by: NURSE PRACTITIONER

## 2021-02-08 PROCEDURE — 3051F PR MOST RECENT HEMOGLOBIN A1C LEVEL 7.0 - < 8.0%: ICD-10-PCS | Mod: CPTII,S$GLB,, | Performed by: NURSE PRACTITIONER

## 2021-02-08 PROCEDURE — 3008F BODY MASS INDEX DOCD: CPT | Mod: CPTII,S$GLB,, | Performed by: NURSE PRACTITIONER

## 2021-02-08 PROCEDURE — 99999 PR PBB SHADOW E&M-EST. PATIENT-LVL V: CPT | Mod: PBBFAC,,, | Performed by: NURSE PRACTITIONER

## 2021-02-08 PROCEDURE — 3051F HG A1C>EQUAL 7.0%<8.0%: CPT | Mod: CPTII,S$GLB,, | Performed by: NURSE PRACTITIONER

## 2021-02-08 PROCEDURE — 3008F PR BODY MASS INDEX (BMI) DOCUMENTED: ICD-10-PCS | Mod: CPTII,S$GLB,, | Performed by: NURSE PRACTITIONER

## 2021-02-08 PROCEDURE — 99999 PR PBB SHADOW E&M-EST. PATIENT-LVL V: ICD-10-PCS | Mod: PBBFAC,,, | Performed by: NURSE PRACTITIONER

## 2021-02-08 PROCEDURE — 3072F PR LOW RISK FOR RETINOPATHY: ICD-10-PCS | Mod: S$GLB,,, | Performed by: NURSE PRACTITIONER

## 2021-02-08 PROCEDURE — 95251 PR GLUCOSE MONITOR, 72 HOUR, PHYS INTERP: ICD-10-PCS | Mod: S$GLB,,, | Performed by: NURSE PRACTITIONER

## 2021-02-08 PROCEDURE — 3072F LOW RISK FOR RETINOPATHY: CPT | Mod: S$GLB,,, | Performed by: NURSE PRACTITIONER

## 2021-02-12 DIAGNOSIS — E06.3 HYPOTHYROIDISM DUE TO HASHIMOTO'S THYROIDITIS: Primary | ICD-10-CM

## 2021-02-12 DIAGNOSIS — E03.8 HYPOTHYROIDISM DUE TO HASHIMOTO'S THYROIDITIS: Primary | ICD-10-CM

## 2021-02-12 RX ORDER — LEVOTHYROXINE SODIUM 50 UG/1
50 TABLET ORAL
Qty: 30 TABLET | Refills: 3 | Status: SHIPPED | OUTPATIENT
Start: 2021-02-12 | End: 2021-07-20

## 2021-02-15 ENCOUNTER — NUTRITION (OUTPATIENT)
Dept: DIABETES | Facility: CLINIC | Age: 31
End: 2021-02-15
Payer: COMMERCIAL

## 2021-02-15 DIAGNOSIS — E10.65 UNCONTROLLED TYPE 1 DIABETES MELLITUS WITH HYPERGLYCEMIA: ICD-10-CM

## 2021-02-15 PROCEDURE — G0108 DIAB MANAGE TRN  PER INDIV: HCPCS | Mod: S$GLB,,, | Performed by: DIETITIAN, REGISTERED

## 2021-02-15 PROCEDURE — 99999 PR PBB SHADOW E&M-EST. PATIENT-LVL I: CPT | Mod: PBBFAC,,, | Performed by: DIETITIAN, REGISTERED

## 2021-02-15 PROCEDURE — G0108 PR DIAB MANAGE TRN  PER INDIV: ICD-10-PCS | Mod: S$GLB,,, | Performed by: DIETITIAN, REGISTERED

## 2021-02-15 PROCEDURE — 99999 PR PBB SHADOW E&M-EST. PATIENT-LVL I: ICD-10-PCS | Mod: PBBFAC,,, | Performed by: DIETITIAN, REGISTERED

## 2021-03-02 ENCOUNTER — OFFICE VISIT (OUTPATIENT)
Dept: DIABETES | Facility: CLINIC | Age: 31
End: 2021-03-02
Payer: COMMERCIAL

## 2021-03-02 VITALS
OXYGEN SATURATION: 99 % | BODY MASS INDEX: 36.06 KG/M2 | HEIGHT: 61 IN | SYSTOLIC BLOOD PRESSURE: 102 MMHG | WEIGHT: 191 LBS | DIASTOLIC BLOOD PRESSURE: 61 MMHG | HEART RATE: 65 BPM

## 2021-03-02 DIAGNOSIS — E06.3 HYPOTHYROIDISM DUE TO HASHIMOTO'S THYROIDITIS: ICD-10-CM

## 2021-03-02 DIAGNOSIS — E03.8 HYPOTHYROIDISM DUE TO HASHIMOTO'S THYROIDITIS: ICD-10-CM

## 2021-03-02 DIAGNOSIS — E66.01 CLASS 2 SEVERE OBESITY DUE TO EXCESS CALORIES WITH SERIOUS COMORBIDITY AND BODY MASS INDEX (BMI) OF 36.0 TO 36.9 IN ADULT: ICD-10-CM

## 2021-03-02 DIAGNOSIS — Z46.81 INSULIN PUMP FITTING OR ADJUSTMENT: ICD-10-CM

## 2021-03-02 DIAGNOSIS — E55.9 VITAMIN D DEFICIENCY: ICD-10-CM

## 2021-03-02 DIAGNOSIS — E10.65 UNCONTROLLED TYPE 1 DIABETES MELLITUS WITH HYPERGLYCEMIA: Primary | ICD-10-CM

## 2021-03-02 DIAGNOSIS — E10.649 TYPE 1 DIABETES MELLITUS WITH HYPOGLYCEMIA AND WITHOUT COMA: ICD-10-CM

## 2021-03-02 DIAGNOSIS — Z96.41 INSULIN PUMP IN PLACE: ICD-10-CM

## 2021-03-02 DIAGNOSIS — Z71.9 HEALTH EDUCATION/COUNSELING: ICD-10-CM

## 2021-03-02 PROCEDURE — 1126F PR PAIN SEVERITY QUANTIFIED, NO PAIN PRESENT: ICD-10-PCS | Mod: S$GLB,,, | Performed by: NURSE PRACTITIONER

## 2021-03-02 PROCEDURE — 95251 PR GLUCOSE MONITOR, 72 HOUR, PHYS INTERP: ICD-10-PCS | Mod: S$GLB,,, | Performed by: NURSE PRACTITIONER

## 2021-03-02 PROCEDURE — 3051F HG A1C>EQUAL 7.0%<8.0%: CPT | Mod: CPTII,S$GLB,, | Performed by: NURSE PRACTITIONER

## 2021-03-02 PROCEDURE — 95251 CONT GLUC MNTR ANALYSIS I&R: CPT | Mod: S$GLB,,, | Performed by: NURSE PRACTITIONER

## 2021-03-02 PROCEDURE — 3072F LOW RISK FOR RETINOPATHY: CPT | Mod: S$GLB,,, | Performed by: NURSE PRACTITIONER

## 2021-03-02 PROCEDURE — 3008F PR BODY MASS INDEX (BMI) DOCUMENTED: ICD-10-PCS | Mod: CPTII,S$GLB,, | Performed by: NURSE PRACTITIONER

## 2021-03-02 PROCEDURE — 99999 PR PBB SHADOW E&M-EST. PATIENT-LVL IV: CPT | Mod: PBBFAC,,, | Performed by: NURSE PRACTITIONER

## 2021-03-02 PROCEDURE — 99214 OFFICE O/P EST MOD 30 MIN: CPT | Mod: S$GLB,,, | Performed by: NURSE PRACTITIONER

## 2021-03-02 PROCEDURE — 3072F PR LOW RISK FOR RETINOPATHY: ICD-10-PCS | Mod: S$GLB,,, | Performed by: NURSE PRACTITIONER

## 2021-03-02 PROCEDURE — 3008F BODY MASS INDEX DOCD: CPT | Mod: CPTII,S$GLB,, | Performed by: NURSE PRACTITIONER

## 2021-03-02 PROCEDURE — 99214 PR OFFICE/OUTPT VISIT, EST, LEVL IV, 30-39 MIN: ICD-10-PCS | Mod: S$GLB,,, | Performed by: NURSE PRACTITIONER

## 2021-03-02 PROCEDURE — 1126F AMNT PAIN NOTED NONE PRSNT: CPT | Mod: S$GLB,,, | Performed by: NURSE PRACTITIONER

## 2021-03-02 PROCEDURE — 3051F PR MOST RECENT HEMOGLOBIN A1C LEVEL 7.0 - < 8.0%: ICD-10-PCS | Mod: CPTII,S$GLB,, | Performed by: NURSE PRACTITIONER

## 2021-03-02 PROCEDURE — 99999 PR PBB SHADOW E&M-EST. PATIENT-LVL IV: ICD-10-PCS | Mod: PBBFAC,,, | Performed by: NURSE PRACTITIONER

## 2021-03-02 RX ORDER — INSULIN DEGLUDEC 200 U/ML
36 INJECTION, SOLUTION SUBCUTANEOUS NIGHTLY
Qty: 1 SYRINGE | Refills: 6 | Status: SHIPPED | OUTPATIENT
Start: 2021-03-02 | End: 2022-11-16 | Stop reason: SDUPTHER

## 2021-04-20 ENCOUNTER — OFFICE VISIT (OUTPATIENT)
Dept: DIABETES | Facility: CLINIC | Age: 31
End: 2021-04-20
Payer: COMMERCIAL

## 2021-04-20 VITALS
HEART RATE: 86 BPM | SYSTOLIC BLOOD PRESSURE: 120 MMHG | BODY MASS INDEX: 35.87 KG/M2 | HEIGHT: 61 IN | DIASTOLIC BLOOD PRESSURE: 71 MMHG | OXYGEN SATURATION: 100 % | WEIGHT: 190 LBS

## 2021-04-20 DIAGNOSIS — Z96.41 INSULIN PUMP IN PLACE: ICD-10-CM

## 2021-04-20 DIAGNOSIS — E55.9 VITAMIN D INSUFFICIENCY: ICD-10-CM

## 2021-04-20 DIAGNOSIS — E10.649 TYPE 1 DIABETES MELLITUS WITH HYPOGLYCEMIA AND WITHOUT COMA: ICD-10-CM

## 2021-04-20 DIAGNOSIS — E10.65 UNCONTROLLED TYPE 1 DIABETES MELLITUS WITH HYPERGLYCEMIA: Primary | ICD-10-CM

## 2021-04-20 DIAGNOSIS — Z71.9 HEALTH EDUCATION/COUNSELING: ICD-10-CM

## 2021-04-20 DIAGNOSIS — E55.9 VITAMIN D DEFICIENCY: ICD-10-CM

## 2021-04-20 DIAGNOSIS — E03.8 HYPOTHYROIDISM DUE TO HASHIMOTO'S THYROIDITIS: ICD-10-CM

## 2021-04-20 DIAGNOSIS — E66.01 CLASS 2 SEVERE OBESITY DUE TO EXCESS CALORIES WITH SERIOUS COMORBIDITY AND BODY MASS INDEX (BMI) OF 35.0 TO 35.9 IN ADULT: ICD-10-CM

## 2021-04-20 DIAGNOSIS — E06.3 HYPOTHYROIDISM DUE TO HASHIMOTO'S THYROIDITIS: ICD-10-CM

## 2021-04-20 DIAGNOSIS — Z46.81 INSULIN PUMP FITTING OR ADJUSTMENT: ICD-10-CM

## 2021-04-20 PROCEDURE — 99999 PR PBB SHADOW E&M-EST. PATIENT-LVL IV: CPT | Mod: PBBFAC,,, | Performed by: NURSE PRACTITIONER

## 2021-04-20 PROCEDURE — 95251 CONT GLUC MNTR ANALYSIS I&R: CPT | Mod: S$GLB,,, | Performed by: NURSE PRACTITIONER

## 2021-04-20 PROCEDURE — 3008F BODY MASS INDEX DOCD: CPT | Mod: CPTII,S$GLB,, | Performed by: NURSE PRACTITIONER

## 2021-04-20 PROCEDURE — 3072F LOW RISK FOR RETINOPATHY: CPT | Mod: S$GLB,,, | Performed by: NURSE PRACTITIONER

## 2021-04-20 PROCEDURE — 3072F PR LOW RISK FOR RETINOPATHY: ICD-10-PCS | Mod: S$GLB,,, | Performed by: NURSE PRACTITIONER

## 2021-04-20 PROCEDURE — 1126F PR PAIN SEVERITY QUANTIFIED, NO PAIN PRESENT: ICD-10-PCS | Mod: S$GLB,,, | Performed by: NURSE PRACTITIONER

## 2021-04-20 PROCEDURE — 3008F PR BODY MASS INDEX (BMI) DOCUMENTED: ICD-10-PCS | Mod: CPTII,S$GLB,, | Performed by: NURSE PRACTITIONER

## 2021-04-20 PROCEDURE — 3051F HG A1C>EQUAL 7.0%<8.0%: CPT | Mod: CPTII,S$GLB,, | Performed by: NURSE PRACTITIONER

## 2021-04-20 PROCEDURE — 95251 PR GLUCOSE MONITOR, 72 HOUR, PHYS INTERP: ICD-10-PCS | Mod: S$GLB,,, | Performed by: NURSE PRACTITIONER

## 2021-04-20 PROCEDURE — 99999 PR PBB SHADOW E&M-EST. PATIENT-LVL IV: ICD-10-PCS | Mod: PBBFAC,,, | Performed by: NURSE PRACTITIONER

## 2021-04-20 PROCEDURE — 99214 OFFICE O/P EST MOD 30 MIN: CPT | Mod: S$GLB,,, | Performed by: NURSE PRACTITIONER

## 2021-04-20 PROCEDURE — 1126F AMNT PAIN NOTED NONE PRSNT: CPT | Mod: S$GLB,,, | Performed by: NURSE PRACTITIONER

## 2021-04-20 PROCEDURE — 3051F PR MOST RECENT HEMOGLOBIN A1C LEVEL 7.0 - < 8.0%: ICD-10-PCS | Mod: CPTII,S$GLB,, | Performed by: NURSE PRACTITIONER

## 2021-04-20 PROCEDURE — 99214 PR OFFICE/OUTPT VISIT, EST, LEVL IV, 30-39 MIN: ICD-10-PCS | Mod: S$GLB,,, | Performed by: NURSE PRACTITIONER

## 2021-04-22 RX ORDER — ERGOCALCIFEROL 1.25 MG/1
50000 CAPSULE ORAL
Qty: 24 CAPSULE | Refills: 3 | Status: SHIPPED | OUTPATIENT
Start: 2021-04-22 | End: 2021-07-12 | Stop reason: SDUPTHER

## 2021-04-22 RX ORDER — ATORVASTATIN CALCIUM 10 MG/1
10 TABLET, FILM COATED ORAL DAILY
Qty: 90 TABLET | Refills: 1 | Status: SHIPPED | OUTPATIENT
Start: 2021-04-22 | End: 2022-02-08 | Stop reason: SDUPTHER

## 2021-04-23 ENCOUNTER — PATIENT MESSAGE (OUTPATIENT)
Dept: DIABETES | Facility: CLINIC | Age: 31
End: 2021-04-23

## 2021-04-23 DIAGNOSIS — E10.65 UNCONTROLLED TYPE 1 DIABETES MELLITUS WITH HYPERGLYCEMIA: Primary | ICD-10-CM

## 2021-04-23 RX ORDER — INSULIN ASPART 100 [IU]/ML
INJECTION, SOLUTION INTRAVENOUS; SUBCUTANEOUS
Qty: 4 VIAL | Refills: 6 | Status: SHIPPED | OUTPATIENT
Start: 2021-04-23 | End: 2022-01-25 | Stop reason: SDUPTHER

## 2021-04-26 ENCOUNTER — PATIENT MESSAGE (OUTPATIENT)
Dept: RESEARCH | Facility: HOSPITAL | Age: 31
End: 2021-04-26

## 2021-07-12 ENCOUNTER — PATIENT MESSAGE (OUTPATIENT)
Dept: DIABETES | Facility: CLINIC | Age: 31
End: 2021-07-12

## 2021-07-12 DIAGNOSIS — E10.65 UNCONTROLLED TYPE 1 DIABETES MELLITUS WITH HYPERGLYCEMIA: ICD-10-CM

## 2021-07-12 DIAGNOSIS — E55.9 VITAMIN D INSUFFICIENCY: ICD-10-CM

## 2021-07-12 DIAGNOSIS — E10.649 TYPE 1 DIABETES MELLITUS WITH HYPOGLYCEMIA AND WITHOUT COMA: Primary | ICD-10-CM

## 2021-07-12 RX ORDER — ORAL SEMAGLUTIDE 3 MG/1
3 TABLET ORAL DAILY
Qty: 30 TABLET | Refills: 0 | Status: SHIPPED | OUTPATIENT
Start: 2021-07-12 | End: 2021-07-20

## 2021-07-12 RX ORDER — ERGOCALCIFEROL 1.25 MG/1
50000 CAPSULE ORAL
Qty: 24 CAPSULE | Refills: 3 | Status: SHIPPED | OUTPATIENT
Start: 2021-07-12 | End: 2022-08-07

## 2021-07-13 ENCOUNTER — PATIENT MESSAGE (OUTPATIENT)
Dept: DIABETES | Facility: CLINIC | Age: 31
End: 2021-07-13

## 2021-07-13 ENCOUNTER — TELEPHONE (OUTPATIENT)
Dept: DIABETES | Facility: CLINIC | Age: 31
End: 2021-07-13

## 2021-07-15 ENCOUNTER — TELEPHONE (OUTPATIENT)
Dept: DIABETES | Facility: CLINIC | Age: 31
End: 2021-07-15

## 2021-07-20 ENCOUNTER — OFFICE VISIT (OUTPATIENT)
Dept: DIABETES | Facility: CLINIC | Age: 31
End: 2021-07-20
Payer: COMMERCIAL

## 2021-07-20 VITALS
OXYGEN SATURATION: 98 % | HEIGHT: 61 IN | TEMPERATURE: 98 F | BODY MASS INDEX: 36.69 KG/M2 | HEART RATE: 93 BPM | WEIGHT: 194.31 LBS | DIASTOLIC BLOOD PRESSURE: 70 MMHG | SYSTOLIC BLOOD PRESSURE: 120 MMHG

## 2021-07-20 DIAGNOSIS — E10.65 TYPE 1 DIABETES MELLITUS WITH HYPERGLYCEMIA: ICD-10-CM

## 2021-07-20 DIAGNOSIS — E66.01 CLASS 2 SEVERE OBESITY DUE TO EXCESS CALORIES WITH SERIOUS COMORBIDITY AND BODY MASS INDEX (BMI) OF 35.0 TO 35.9 IN ADULT: ICD-10-CM

## 2021-07-20 DIAGNOSIS — E10.649 TYPE 1 DIABETES MELLITUS WITH HYPOGLYCEMIA AND WITHOUT COMA: ICD-10-CM

## 2021-07-20 DIAGNOSIS — Z96.41 INSULIN PUMP IN PLACE: ICD-10-CM

## 2021-07-20 DIAGNOSIS — E88.819 INSULIN RESISTANCE: ICD-10-CM

## 2021-07-20 DIAGNOSIS — E55.9 VITAMIN D DEFICIENCY: ICD-10-CM

## 2021-07-20 DIAGNOSIS — E03.8 HYPOTHYROIDISM DUE TO HASHIMOTO'S THYROIDITIS: ICD-10-CM

## 2021-07-20 DIAGNOSIS — E06.3 HYPOTHYROIDISM DUE TO HASHIMOTO'S THYROIDITIS: ICD-10-CM

## 2021-07-20 DIAGNOSIS — Z46.81 INSULIN PUMP FITTING OR ADJUSTMENT: ICD-10-CM

## 2021-07-20 DIAGNOSIS — E10.65 UNCONTROLLED TYPE 1 DIABETES MELLITUS WITH HYPERGLYCEMIA: Primary | ICD-10-CM

## 2021-07-20 PROCEDURE — 99214 PR OFFICE/OUTPT VISIT, EST, LEVL IV, 30-39 MIN: ICD-10-PCS | Mod: S$GLB,,, | Performed by: NURSE PRACTITIONER

## 2021-07-20 PROCEDURE — 3072F PR LOW RISK FOR RETINOPATHY: ICD-10-PCS | Mod: CPTII,S$GLB,, | Performed by: NURSE PRACTITIONER

## 2021-07-20 PROCEDURE — 99214 OFFICE O/P EST MOD 30 MIN: CPT | Mod: S$GLB,,, | Performed by: NURSE PRACTITIONER

## 2021-07-20 PROCEDURE — 3008F BODY MASS INDEX DOCD: CPT | Mod: CPTII,S$GLB,, | Performed by: NURSE PRACTITIONER

## 2021-07-20 PROCEDURE — 99999 PR PBB SHADOW E&M-EST. PATIENT-LVL IV: ICD-10-PCS | Mod: PBBFAC,,, | Performed by: NURSE PRACTITIONER

## 2021-07-20 PROCEDURE — 1126F AMNT PAIN NOTED NONE PRSNT: CPT | Mod: CPTII,S$GLB,, | Performed by: NURSE PRACTITIONER

## 2021-07-20 PROCEDURE — 3008F PR BODY MASS INDEX (BMI) DOCUMENTED: ICD-10-PCS | Mod: CPTII,S$GLB,, | Performed by: NURSE PRACTITIONER

## 2021-07-20 PROCEDURE — 95251 CONT GLUC MNTR ANALYSIS I&R: CPT | Mod: S$GLB,,, | Performed by: NURSE PRACTITIONER

## 2021-07-20 PROCEDURE — 3044F HG A1C LEVEL LT 7.0%: CPT | Mod: CPTII,S$GLB,, | Performed by: NURSE PRACTITIONER

## 2021-07-20 PROCEDURE — 3044F PR MOST RECENT HEMOGLOBIN A1C LEVEL <7.0%: ICD-10-PCS | Mod: CPTII,S$GLB,, | Performed by: NURSE PRACTITIONER

## 2021-07-20 PROCEDURE — 99999 PR PBB SHADOW E&M-EST. PATIENT-LVL IV: CPT | Mod: PBBFAC,,, | Performed by: NURSE PRACTITIONER

## 2021-07-20 PROCEDURE — 95251 PR GLUCOSE MONITOR, 72 HOUR, PHYS INTERP: ICD-10-PCS | Mod: S$GLB,,, | Performed by: NURSE PRACTITIONER

## 2021-07-20 PROCEDURE — 3072F LOW RISK FOR RETINOPATHY: CPT | Mod: CPTII,S$GLB,, | Performed by: NURSE PRACTITIONER

## 2021-07-20 PROCEDURE — 1126F PR PAIN SEVERITY QUANTIFIED, NO PAIN PRESENT: ICD-10-PCS | Mod: CPTII,S$GLB,, | Performed by: NURSE PRACTITIONER

## 2021-07-20 RX ORDER — METFORMIN HYDROCHLORIDE 500 MG/1
500 TABLET, EXTENDED RELEASE ORAL 2 TIMES DAILY WITH MEALS
Qty: 60 TABLET | Refills: 6 | Status: SHIPPED | OUTPATIENT
Start: 2021-07-20 | End: 2022-01-25 | Stop reason: SDUPTHER

## 2021-07-20 RX ORDER — LEVOTHYROXINE SODIUM 25 UG/1
25 TABLET ORAL
Qty: 30 TABLET | Refills: 6 | Status: SHIPPED | OUTPATIENT
Start: 2021-07-20 | End: 2022-05-03

## 2021-07-21 ENCOUNTER — TELEPHONE (OUTPATIENT)
Dept: DIABETES | Facility: CLINIC | Age: 31
End: 2021-07-21

## 2021-08-02 ENCOUNTER — PATIENT MESSAGE (OUTPATIENT)
Dept: DIABETES | Facility: CLINIC | Age: 31
End: 2021-08-02

## 2021-10-26 ENCOUNTER — PATIENT MESSAGE (OUTPATIENT)
Dept: DIABETES | Facility: CLINIC | Age: 31
End: 2021-10-26
Payer: COMMERCIAL

## 2021-10-26 ENCOUNTER — TELEPHONE (OUTPATIENT)
Dept: DIABETES | Facility: CLINIC | Age: 31
End: 2021-10-26
Payer: COMMERCIAL

## 2021-12-07 ENCOUNTER — PATIENT MESSAGE (OUTPATIENT)
Dept: DIABETES | Facility: CLINIC | Age: 31
End: 2021-12-07
Payer: COMMERCIAL

## 2021-12-15 DIAGNOSIS — N63.23 LUMP IN LOWER OUTER QUADRANT OF LEFT BREAST: Primary | ICD-10-CM

## 2021-12-17 ENCOUNTER — TELEPHONE (OUTPATIENT)
Dept: RADIOLOGY | Facility: HOSPITAL | Age: 31
End: 2021-12-17
Payer: COMMERCIAL

## 2021-12-17 DIAGNOSIS — N63.23 LUMP IN LOWER OUTER QUADRANT OF LEFT BREAST: Primary | ICD-10-CM

## 2022-01-06 ENCOUNTER — HOSPITAL ENCOUNTER (OUTPATIENT)
Dept: RADIOLOGY | Facility: HOSPITAL | Age: 32
Discharge: HOME | End: 2022-01-06
Attending: NURSE PRACTITIONER
Payer: COMMERCIAL

## 2022-01-06 VITALS — WEIGHT: 190 LBS | BODY MASS INDEX: 35.87 KG/M2 | HEIGHT: 61 IN

## 2022-01-06 DIAGNOSIS — N63.23 LUMP IN LOWER OUTER QUADRANT OF LEFT BREAST: ICD-10-CM

## 2022-01-06 PROCEDURE — 77062 BREAST TOMOSYNTHESIS BI: CPT | Mod: 26,,, | Performed by: RADIOLOGY

## 2022-01-06 PROCEDURE — 76642 ULTRASOUND BREAST LIMITED: CPT | Mod: 26,LT,, | Performed by: RADIOLOGY

## 2022-01-06 PROCEDURE — 77066 MAMMO DIGITAL DIAGNOSTIC BILAT WITH TOMO: ICD-10-PCS | Mod: 26,,, | Performed by: RADIOLOGY

## 2022-01-06 PROCEDURE — 76642 US BREAST LEFT LIMITED: ICD-10-PCS | Mod: 26,LT,, | Performed by: RADIOLOGY

## 2022-01-06 PROCEDURE — 77066 DX MAMMO INCL CAD BI: CPT | Mod: TC

## 2022-01-06 PROCEDURE — 77066 DX MAMMO INCL CAD BI: CPT | Mod: 26,,, | Performed by: RADIOLOGY

## 2022-01-06 PROCEDURE — 76642 ULTRASOUND BREAST LIMITED: CPT | Mod: TC,LT

## 2022-01-06 PROCEDURE — 77062 MAMMO DIGITAL DIAGNOSTIC BILAT WITH TOMO: ICD-10-PCS | Mod: 26,,, | Performed by: RADIOLOGY

## 2022-01-25 ENCOUNTER — OFFICE VISIT (OUTPATIENT)
Dept: DIABETES | Facility: CLINIC | Age: 32
End: 2022-01-25
Payer: COMMERCIAL

## 2022-01-25 VITALS
WEIGHT: 192 LBS | OXYGEN SATURATION: 98 % | TEMPERATURE: 99 F | SYSTOLIC BLOOD PRESSURE: 118 MMHG | HEART RATE: 98 BPM | DIASTOLIC BLOOD PRESSURE: 68 MMHG | HEIGHT: 61 IN | BODY MASS INDEX: 36.25 KG/M2

## 2022-01-25 DIAGNOSIS — E03.8 HYPOTHYROIDISM DUE TO HASHIMOTO'S THYROIDITIS: ICD-10-CM

## 2022-01-25 DIAGNOSIS — E88.819 INSULIN RESISTANCE: ICD-10-CM

## 2022-01-25 DIAGNOSIS — E06.3 HYPOTHYROIDISM DUE TO HASHIMOTO'S THYROIDITIS: ICD-10-CM

## 2022-01-25 DIAGNOSIS — E10.65 UNCONTROLLED TYPE 1 DIABETES MELLITUS WITH HYPERGLYCEMIA: ICD-10-CM

## 2022-01-25 DIAGNOSIS — E10.649 TYPE 1 DIABETES MELLITUS WITH HYPOGLYCEMIA AND WITHOUT COMA: ICD-10-CM

## 2022-01-25 DIAGNOSIS — Z96.41 INSULIN PUMP IN PLACE: ICD-10-CM

## 2022-01-25 DIAGNOSIS — E10.65 TYPE 1 DIABETES MELLITUS WITH HYPERGLYCEMIA: Primary | ICD-10-CM

## 2022-01-25 DIAGNOSIS — E55.9 VITAMIN D DEFICIENCY: ICD-10-CM

## 2022-01-25 DIAGNOSIS — E66.01 CLASS 2 SEVERE OBESITY DUE TO EXCESS CALORIES WITH SERIOUS COMORBIDITY AND BODY MASS INDEX (BMI) OF 36.0 TO 36.9 IN ADULT: ICD-10-CM

## 2022-01-25 DIAGNOSIS — Z46.81 INSULIN PUMP FITTING OR ADJUSTMENT: ICD-10-CM

## 2022-01-25 PROCEDURE — 99214 OFFICE O/P EST MOD 30 MIN: CPT | Mod: S$GLB,,, | Performed by: NURSE PRACTITIONER

## 2022-01-25 PROCEDURE — 3008F BODY MASS INDEX DOCD: CPT | Mod: CPTII,S$GLB,, | Performed by: NURSE PRACTITIONER

## 2022-01-25 PROCEDURE — 3008F PR BODY MASS INDEX (BMI) DOCUMENTED: ICD-10-PCS | Mod: CPTII,S$GLB,, | Performed by: NURSE PRACTITIONER

## 2022-01-25 PROCEDURE — 1160F PR REVIEW ALL MEDS BY PRESCRIBER/CLIN PHARMACIST DOCUMENTED: ICD-10-PCS | Mod: CPTII,S$GLB,, | Performed by: NURSE PRACTITIONER

## 2022-01-25 PROCEDURE — 99999 PR PBB SHADOW E&M-EST. PATIENT-LVL V: ICD-10-PCS | Mod: PBBFAC,,, | Performed by: NURSE PRACTITIONER

## 2022-01-25 PROCEDURE — 3074F SYST BP LT 130 MM HG: CPT | Mod: CPTII,S$GLB,, | Performed by: NURSE PRACTITIONER

## 2022-01-25 PROCEDURE — 1160F RVW MEDS BY RX/DR IN RCRD: CPT | Mod: CPTII,S$GLB,, | Performed by: NURSE PRACTITIONER

## 2022-01-25 PROCEDURE — 99214 PR OFFICE/OUTPT VISIT, EST, LEVL IV, 30-39 MIN: ICD-10-PCS | Mod: S$GLB,,, | Performed by: NURSE PRACTITIONER

## 2022-01-25 PROCEDURE — 3078F DIAST BP <80 MM HG: CPT | Mod: CPTII,S$GLB,, | Performed by: NURSE PRACTITIONER

## 2022-01-25 PROCEDURE — 95251 CONT GLUC MNTR ANALYSIS I&R: CPT | Mod: S$GLB,,, | Performed by: NURSE PRACTITIONER

## 2022-01-25 PROCEDURE — 1159F MED LIST DOCD IN RCRD: CPT | Mod: CPTII,S$GLB,, | Performed by: NURSE PRACTITIONER

## 2022-01-25 PROCEDURE — 3078F PR MOST RECENT DIASTOLIC BLOOD PRESSURE < 80 MM HG: ICD-10-PCS | Mod: CPTII,S$GLB,, | Performed by: NURSE PRACTITIONER

## 2022-01-25 PROCEDURE — 3074F PR MOST RECENT SYSTOLIC BLOOD PRESSURE < 130 MM HG: ICD-10-PCS | Mod: CPTII,S$GLB,, | Performed by: NURSE PRACTITIONER

## 2022-01-25 PROCEDURE — 95251 PR GLUCOSE MONITOR, 72 HOUR, PHYS INTERP: ICD-10-PCS | Mod: S$GLB,,, | Performed by: NURSE PRACTITIONER

## 2022-01-25 PROCEDURE — 1159F PR MEDICATION LIST DOCUMENTED IN MEDICAL RECORD: ICD-10-PCS | Mod: CPTII,S$GLB,, | Performed by: NURSE PRACTITIONER

## 2022-01-25 PROCEDURE — 99999 PR PBB SHADOW E&M-EST. PATIENT-LVL V: CPT | Mod: PBBFAC,,, | Performed by: NURSE PRACTITIONER

## 2022-01-25 RX ORDER — INSULIN ASPART 100 [IU]/ML
INJECTION, SOLUTION INTRAVENOUS; SUBCUTANEOUS
Qty: 4 EACH | Refills: 6 | Status: SHIPPED | OUTPATIENT
Start: 2022-01-25 | End: 2022-03-18 | Stop reason: SDUPTHER

## 2022-01-25 RX ORDER — METFORMIN HYDROCHLORIDE 500 MG/1
1000 TABLET, EXTENDED RELEASE ORAL NIGHTLY
Qty: 60 TABLET | Refills: 6 | Status: SHIPPED | OUTPATIENT
Start: 2022-01-25 | End: 2022-09-06

## 2022-01-25 NOTE — ASSESSMENT & PLAN NOTE
BG readings are mostly at/ near goal on dexcom download   Due for A1c   Running higher after dinner sometimes-- she is hesitant to increase her insulin to carbohydrate ratio at dinner as she fears hypoglycemia.  She would like to increase her metformin and see if that helps.  If not the we will adjust her carbohydrate ratio at dinner to 3.5   She will let me know how things are going in a couple of weeks    Much better control with insulin pump therapy.         -- Medication Changes:      Adjust Metformin XR to 1000 mg nightly with dinner.     Continue to use Tandem insulin pump with the following settings   With Novolog insulin     Insulin pump settings: continue to remain in Control IQ   Basal:  Continue current basal settings   MN-3AM:   1.75 units/hr -  3AM- 8AM: 1.5 units/hr   8AM- MN: 1.4 units/hr     ICR   MN- 5 PM: 1:5  5PM- MN: 1:4-- consider tightening to 1:3.5 if needed.    ISF:   MN-5PM 1:20-- continue   5PM- MN 1:17- tighten     Target- 100-120     IOB: 3 hours       -- Reviewed goals of therapy are to get the best control we can without hypoglycemia.  -- Reviewed patient's current insulin regimen. Clarified proper insulin dose and timing in relation to meals, etc. Insulin injection sites and proper rotation instructed.    -- Advised frequent self blood glucose monitoring.  Patient encouraged to document glucose results and bring them to every clinic visit. Continue to use Dexcom   -- Hypoglycemia precautions discussed. Instructed on precautions before driving.    -- Call for Bg repeatedly < 70 or > 180.   -- Close adherence to lifestyle changes recommended.   -- Periodic follow ups for eye evaluations, foot care and dental care suggested.    -- schedule eye exam

## 2022-01-25 NOTE — ASSESSMENT & PLAN NOTE
Vit D, 25-Hydroxy   Date Value Ref Range Status   07/13/2021 27 (L) 30 - 96 ng/mL Final     Comment:     Vitamin D deficiency.........<10 ng/mL                              Vitamin D insufficiency......10-29 ng/mL       Vitamin D sufficiency........> or equal to 30 ng/mL  Vitamin D toxicity............>100 ng/mL         On Ergo 2 times per week   Repeat vit d level

## 2022-01-25 NOTE — PROGRESS NOTES
CC:   Chief Complaint   Patient presents with    Diabetes Mellitus       HPI: Etta Bill is a 31 y.o. female presents for a follow up visit today for the management of T1DM.      She was diagnosed with Type 1 diabetes in September 2020 when she went into DKA and was hospitalized. She presented with fatigue, polyuria, polydipsia, nausea and vomiting. BG was 330 on chemistry panel. She was discharged home on insulin therapy ( MDI).   9/30/2020--LYDIA and anti islet cell antibodies--both positive.  C-peptide low at 0.53 with a fasting glucose of 243--confirms the diagnosis of type 1 diabetes      Family hx of diabetes: grandmother   Hospitalized for diabetes: DKA at diagnosis.     No personal or FH of thyroid cancer or personal of pancreatic cancer or pancreatitis.       She started the insulin pump ( Tandem  T-Slim) on 2/15/2021.  She still feels confident with her carbohydrate counting  Blood sugar readings continue to improve on Dexcom and insulin pump.   Our last visit was in July of 2021. Still on the metformin with dinner -- denies diarrhea.  Actually suffering with constipation.  She has been taking 500 mg with dinner.  She did hold her metformin for a week or 2 to see if the constipation would improve and it did not.  No recent A1c on file   Blood sugar readings are generally running higher after dinner and overnight.  She is hesitant to adjust her carbohydrate ratio today.  She fears hypoglycemia overnight.  It also looks like on download that her correction factor is not strong enough in the evening time.       Hypothyroidism due to Hashimoto's-   Started on levothyroxine 50 mcg daily after + antibodies an elevated TSH . Her TSH level was low so we cut back on her LT4 to 50 mcg daily except 1/2 tablet 2 days per week. - however she was taking it incorrectly so we adjusted her levothyroxine to 25 mcg daily at last visit and her TSH was WNL       Vitamin-D  she is taking Ergo 2 times per week          DIABETES COMPLICATIONS: none      Diabetes Management Status    ASA:  No    Statin: Taking - Lipitor   ACE/ARB: Not taking     Screening or Prevention Patient's value Goal Complete/Controlled?   HgA1C Testing and Control   Lab Results   Component Value Date    HGBA1C 6.4 (H) 07/13/2021      Annually/Less than 8% No   Lipid profile : 04/13/2021 Annually Yes   LDL control Lab Results   Component Value Date    LDLCALC 71 04/13/2021    Annually/Less than 100 mg/dl  No   Nephropathy screening Lab Results   Component Value Date    LABMICR 3.0 09/30/2020     Lab Results   Component Value Date    PROTEINUA Negative 09/12/2020    Annually Yes   Blood pressure BP Readings from Last 1 Encounters:   01/25/22 118/68    Less than 140/90 Yes   Dilated retinal exam : 12/10/2020 Annually No   Foot exam   : 09/28/2020 Annually No       CURRENT A1C:    Hemoglobin A1C   Date Value Ref Range Status   07/13/2021 6.4 (H) 4.0 - 5.6 % Final     Comment:     ADA Screening Guidelines:  5.7-6.4%  Consistent with prediabetes  >or=6.5%  Consistent with diabetes    High levels of fetal hemoglobin interfere with the HbA1C  assay. Heterozygous hemoglobin variants (HbS, HgC, etc)do  not significantly interfere with this assay.   However, presence of multiple variants may affect accuracy.     04/13/2021 7.2 (H) 4.0 - 5.6 % Final     Comment:     ADA Screening Guidelines:  5.7-6.4%  Consistent with prediabetes  >or=6.5%  Consistent with diabetes    High levels of fetal hemoglobin interfere with the HbA1C  assay. Heterozygous hemoglobin variants (HbS, HgC, etc)do  not significantly interfere with this assay.   However, presence of multiple variants may affect accuracy.     12/22/2020 7.4 (H) 4.0 - 5.6 % Final     Comment:     ADA Screening Guidelines:  5.7-6.4%  Consistent with prediabetes  >or=6.5%  Consistent with diabetes  High levels of fetal hemoglobin interfere with the HbA1C  assay. Heterozygous hemoglobin variants (HbS, HgC, etc)do  not  significantly interfere with this assay.   However, presence of multiple variants may affect accuracy.         GOAL A1C: 6.5% without hypoglycemia     DM MEDICATIONS USED IN THE PAST: Levemir   Novolog   Tresiba   Fiasp   Tandem TSLim     CURRENT DIABETES MEDICATIONS: Metformin  mg with dinner     Insulin Pump: Tandem T-slim with Novolog  insulin     Pump settings: currently in Control IQ mode   Basal: adjusted basal based on average seen from Control IQ -   MN-3AM:   1.75 units/hr   3AM- 8AM: 1.5 units/hr   8AM- MN: 1.4 units/hr      ICR   MN-5PM:1:5  5PM-MN: 1:4      ISF: 1:20     Target: 120 ( but target in control IQ is 110)     IOB: 3 hours ( but control IQ is 5 hours)      Discussed sleep mode- changes target to 130      Temp basals: - she is using the sleep mode     Pump site change: q 2-3 days   Cartridge change: q 2-3 days  Insulin TDD: 75.85 units/day     Basal 45%   Bolus 41% correction bolus:  13%  2% override     Back up Lantus/Levemir: back up Tresiba at home       Patient's pump was downloaded in clinic today and reviewed with patient.   Please see attached documents for pump download.     Pump supplies come from: [employer]        BLOOD GLUCOSE MONITORING:    Sensor type: Dexcom G6   Average BG readin  Estimated A1c:  6.9%   Time in range:  79%  Limits:   Site change:q10 days    Dexcom supplies from- [employer]    Sensor was downloaded in clinic today and reviewed with patient.   Please see attached document for download.         HYPOGLYCEMIA: 1% low and  0% very low seen on dexcom  Has Baqsimi nasal spray   + possible hypoglycemia unawareness on the past before she started the Dexcom. Having bad night sweats that are waking her up from her sleep.       MEALS: eating 3 meals per day    She is CHO counting   She feels confident with CHO counting   Snack: crackers and cheese and baby bell cheese or almonds and other nuts   Drinks: water   No sugary beverages        CURRENT EXERCISE:   No      Review of Systems  Review of Systems   Constitutional: Negative for appetite change, fatigue and unexpected weight change.   HENT: Negative for trouble swallowing.    Eyes: Negative for visual disturbance.   Respiratory: Negative for shortness of breath.    Cardiovascular: Negative for chest pain.   Gastrointestinal: Positive for constipation. Negative for abdominal distention, abdominal pain and nausea.   Endocrine: Negative for polydipsia, polyphagia and polyuria.   Genitourinary:        No Nocturia    Skin: Negative for wound.   Neurological: Negative for numbness.       Physical Exam   Physical Exam  Vitals and nursing note reviewed.   Constitutional:       Appearance: She is well-developed. She is obese.   HENT:      Head: Normocephalic and atraumatic.      Right Ear: External ear normal.      Left Ear: External ear normal.      Nose: Nose normal.   Neck:      Thyroid: No thyromegaly.      Trachea: No tracheal deviation.   Cardiovascular:      Rate and Rhythm: Normal rate and regular rhythm.      Heart sounds: No murmur heard.      Pulmonary:      Effort: Pulmonary effort is normal. No respiratory distress.      Breath sounds: Normal breath sounds.   Abdominal:      Palpations: Abdomen is soft.      Tenderness: There is no abdominal tenderness.      Hernia: No hernia is present.   Musculoskeletal:      Cervical back: Normal range of motion and neck supple.   Skin:     General: Skin is warm and dry.      Capillary Refill: Capillary refill takes less than 2 seconds.      Findings: No rash.      Comments: Insulin pump sites and Dexcom sites without complication. No lipo hypertropthy or atrophy     Neurological:      Mental Status: She is alert and oriented to person, place, and time.      Cranial Nerves: No cranial nerve deficit.   Psychiatric:         Behavior: Behavior normal.         Judgment: Judgment normal.           FOOT EXAMINATION: Appropriate footwear       Lab Results   Component Value Date     TSH 3.041 08/17/2021           Type 1 diabetes mellitus with hyperglycemia  BG readings are mostly at/ near goal on dexcom download   Due for A1c   Running higher after dinner sometimes-- she is hesitant to increase her insulin to carbohydrate ratio at dinner as she fears hypoglycemia.  She would like to increase her metformin and see if that helps.  If not the we will adjust her carbohydrate ratio at dinner to 3.5   She will let me know how things are going in a couple of weeks    Much better control with insulin pump therapy.         -- Medication Changes:      Adjust Metformin XR to 1000 mg nightly with dinner.     Continue to use Tandem insulin pump with the following settings   With Novolog insulin     Insulin pump settings: continue to remain in Control IQ   Basal:  Continue current basal settings   MN-3AM:   1.75 units/hr -  3AM- 8AM: 1.5 units/hr   8AM- MN: 1.4 units/hr     ICR   MN- 5 PM: 1:5  5PM- MN: 1:4-- consider tightening to 1:3.5 if needed.    ISF:   MN-5PM 1:20-- continue   5PM- MN 1:17- tighten     Target- 100-120     IOB: 3 hours       -- Reviewed goals of therapy are to get the best control we can without hypoglycemia.  -- Reviewed patient's current insulin regimen. Clarified proper insulin dose and timing in relation to meals, etc. Insulin injection sites and proper rotation instructed.    -- Advised frequent self blood glucose monitoring.  Patient encouraged to document glucose results and bring them to every clinic visit. Continue to use Dexcom   -- Hypoglycemia precautions discussed. Instructed on precautions before driving.    -- Call for Bg repeatedly < 70 or > 180.   -- Close adherence to lifestyle changes recommended.   -- Periodic follow ups for eye evaluations, foot care and dental care suggested.    -- schedule eye exam     Type 1 diabetes mellitus with hypoglycemia and without coma  Reviewed hypoglycemia management: Treat with 1/2 glass of juice, 1/2 can regular coke, or 4 glucose  tablets.   Monitor and repeat treatment every 15 minutes until BG is >70   Then have a snack, which includes a complex carbohydrate and protein.    - has Baqsimi     -- has improved with pump with control IQ    Class 2 severe obesity due to excess calories with serious comorbidity and body mass index (BMI) of 36.0 to 36.9 in adult  Body mass index is 36.28 kg/m².  Increases insulin resistance.   Discussed DM diet and exercise.   Her insurance denied Rybelsus  Now on Metformin       Vitamin D deficiency  Vit D, 25-Hydroxy   Date Value Ref Range Status   07/13/2021 27 (L) 30 - 96 ng/mL Final     Comment:     Vitamin D deficiency.........<10 ng/mL                              Vitamin D insufficiency......10-29 ng/mL       Vitamin D sufficiency........> or equal to 30 ng/mL  Vitamin D toxicity............>100 ng/mL         On Ergo 2 times per week   Repeat vit d level     Hypothyroidism due to Hashimoto's thyroiditis  TPO +   Confirms hypothyroidism  TSH above goal  There was some confusion with the last instructions for the levothyroxine  Below goal when taking levothyroxine 50 mcg daily       On levothyroxine 25 mcg daily    reinforced take by itself on empty stomach, first thing in the morning and wait at least 30-60 minutes to eat, drink, or take other medications.    Check TSH level             Insulin pump fitting or adjustment  See above for adjustments     Insulin pump in place  See above           Follow up in about 5 months (around 6/25/2022).  Schedule fasting labs this week or next week   Schedule 5 months follow up please   Schedule with Dr. Badillo for eye exam please         No orders of the defined types were placed in this encounter.      Recommendations were discussed with the patient in detail  The patient verbalized understanding and agrees with the plan outlined as above.     This note was partly generated with M2[employer] voice recognition software. I apologize for any possible typographical  errors.

## 2022-01-25 NOTE — ASSESSMENT & PLAN NOTE
Body mass index is 36.28 kg/m².  Increases insulin resistance.   Discussed DM diet and exercise.   Her insurance denied Rybelsus  Now on Metformin

## 2022-01-25 NOTE — ASSESSMENT & PLAN NOTE
Reviewed hypoglycemia management: Treat with 1/2 glass of juice, 1/2 can regular coke, or 4 glucose tablets.   Monitor and repeat treatment every 15 minutes until BG is >70   Then have a snack, which includes a complex carbohydrate and protein.    - has Baqsimi     -- has improved with pump with control IQ

## 2022-01-25 NOTE — ASSESSMENT & PLAN NOTE
TPO +   Confirms hypothyroidism  TSH above goal  There was some confusion with the last instructions for the levothyroxine  Below goal when taking levothyroxine 50 mcg daily       On levothyroxine 25 mcg daily    reinforced take by itself on empty stomach, first thing in the morning and wait at least 30-60 minutes to eat, drink, or take other medications.    Check TSH level

## 2022-02-03 ENCOUNTER — PATIENT MESSAGE (OUTPATIENT)
Dept: DIABETES | Facility: CLINIC | Age: 32
End: 2022-02-03
Payer: COMMERCIAL

## 2022-03-17 ENCOUNTER — PATIENT MESSAGE (OUTPATIENT)
Dept: DIABETES | Facility: CLINIC | Age: 32
End: 2022-03-17
Payer: COMMERCIAL

## 2022-03-18 DIAGNOSIS — E10.65 TYPE 1 DIABETES MELLITUS WITH HYPERGLYCEMIA: ICD-10-CM

## 2022-03-18 RX ORDER — INSULIN ASPART 100 [IU]/ML
INJECTION, SOLUTION INTRAVENOUS; SUBCUTANEOUS
Qty: 4 EACH | Refills: 6 | Status: SHIPPED | OUTPATIENT
Start: 2022-03-18 | End: 2022-11-16 | Stop reason: SDUPTHER

## 2022-04-12 ENCOUNTER — PATIENT MESSAGE (OUTPATIENT)
Dept: DIABETES | Facility: CLINIC | Age: 32
End: 2022-04-12
Payer: COMMERCIAL

## 2022-04-12 ENCOUNTER — TELEPHONE (OUTPATIENT)
Dept: DIABETES | Facility: CLINIC | Age: 32
End: 2022-04-12
Payer: COMMERCIAL

## 2022-04-12 NOTE — TELEPHONE ENCOUNTER
----- Message from Carleen Sinha LPN sent at 4/12/2022  2:10 PM CDT -----  Pt stated her blood sugars have been running low at night, printed her dexcom out and placed on your desk

## 2022-06-02 ENCOUNTER — PATIENT MESSAGE (OUTPATIENT)
Dept: DIABETES | Facility: CLINIC | Age: 32
End: 2022-06-02
Payer: COMMERCIAL

## 2022-06-02 DIAGNOSIS — E10.65 TYPE 1 DIABETES MELLITUS WITH HYPERGLYCEMIA: Primary | ICD-10-CM

## 2022-06-07 ENCOUNTER — PATIENT MESSAGE (OUTPATIENT)
Dept: DIABETES | Facility: CLINIC | Age: 32
End: 2022-06-07
Payer: COMMERCIAL

## 2022-07-14 ENCOUNTER — PATIENT MESSAGE (OUTPATIENT)
Dept: DIABETES | Facility: CLINIC | Age: 32
End: 2022-07-14
Payer: COMMERCIAL

## 2022-11-16 ENCOUNTER — OFFICE VISIT (OUTPATIENT)
Dept: DIABETES | Facility: CLINIC | Age: 32
End: 2022-11-16
Payer: COMMERCIAL

## 2022-11-16 VITALS
BODY MASS INDEX: 37.19 KG/M2 | OXYGEN SATURATION: 99 % | HEIGHT: 61 IN | SYSTOLIC BLOOD PRESSURE: 123 MMHG | WEIGHT: 197 LBS | HEART RATE: 92 BPM | DIASTOLIC BLOOD PRESSURE: 74 MMHG

## 2022-11-16 DIAGNOSIS — E55.9 VITAMIN D DEFICIENCY: ICD-10-CM

## 2022-11-16 DIAGNOSIS — Z96.41 INSULIN PUMP IN PLACE: ICD-10-CM

## 2022-11-16 DIAGNOSIS — E55.9 VITAMIN D INSUFFICIENCY: ICD-10-CM

## 2022-11-16 DIAGNOSIS — E03.8 HYPOTHYROIDISM DUE TO HASHIMOTO'S THYROIDITIS: ICD-10-CM

## 2022-11-16 DIAGNOSIS — E06.3 HYPOTHYROIDISM DUE TO HASHIMOTO'S THYROIDITIS: ICD-10-CM

## 2022-11-16 DIAGNOSIS — E10.65 TYPE 1 DIABETES MELLITUS WITH HYPERGLYCEMIA: Primary | ICD-10-CM

## 2022-11-16 DIAGNOSIS — E10.65 UNCONTROLLED TYPE 1 DIABETES MELLITUS WITH HYPERGLYCEMIA: ICD-10-CM

## 2022-11-16 DIAGNOSIS — E10.649 TYPE 1 DIABETES MELLITUS WITH HYPOGLYCEMIA AND WITHOUT COMA: ICD-10-CM

## 2022-11-16 DIAGNOSIS — E66.01 CLASS 2 SEVERE OBESITY DUE TO EXCESS CALORIES WITH SERIOUS COMORBIDITY AND BODY MASS INDEX (BMI) OF 37.0 TO 37.9 IN ADULT: ICD-10-CM

## 2022-11-16 PROCEDURE — 3078F PR MOST RECENT DIASTOLIC BLOOD PRESSURE < 80 MM HG: ICD-10-PCS | Mod: CPTII,S$GLB,, | Performed by: NURSE PRACTITIONER

## 2022-11-16 PROCEDURE — 1160F PR REVIEW ALL MEDS BY PRESCRIBER/CLIN PHARMACIST DOCUMENTED: ICD-10-PCS | Mod: CPTII,S$GLB,, | Performed by: NURSE PRACTITIONER

## 2022-11-16 PROCEDURE — 95251 CONT GLUC MNTR ANALYSIS I&R: CPT | Mod: S$GLB,,, | Performed by: NURSE PRACTITIONER

## 2022-11-16 PROCEDURE — 3044F HG A1C LEVEL LT 7.0%: CPT | Mod: CPTII,S$GLB,, | Performed by: NURSE PRACTITIONER

## 2022-11-16 PROCEDURE — 3078F DIAST BP <80 MM HG: CPT | Mod: CPTII,S$GLB,, | Performed by: NURSE PRACTITIONER

## 2022-11-16 PROCEDURE — 99999 PR PBB SHADOW E&M-EST. PATIENT-LVL IV: CPT | Mod: PBBFAC,,, | Performed by: NURSE PRACTITIONER

## 2022-11-16 PROCEDURE — 3074F SYST BP LT 130 MM HG: CPT | Mod: CPTII,S$GLB,, | Performed by: NURSE PRACTITIONER

## 2022-11-16 PROCEDURE — 3044F PR MOST RECENT HEMOGLOBIN A1C LEVEL <7.0%: ICD-10-PCS | Mod: CPTII,S$GLB,, | Performed by: NURSE PRACTITIONER

## 2022-11-16 PROCEDURE — 99214 OFFICE O/P EST MOD 30 MIN: CPT | Mod: S$GLB,,, | Performed by: NURSE PRACTITIONER

## 2022-11-16 PROCEDURE — 99214 PR OFFICE/OUTPT VISIT, EST, LEVL IV, 30-39 MIN: ICD-10-PCS | Mod: S$GLB,,, | Performed by: NURSE PRACTITIONER

## 2022-11-16 PROCEDURE — 3061F NEG MICROALBUMINURIA REV: CPT | Mod: CPTII,S$GLB,, | Performed by: NURSE PRACTITIONER

## 2022-11-16 PROCEDURE — 1159F MED LIST DOCD IN RCRD: CPT | Mod: CPTII,S$GLB,, | Performed by: NURSE PRACTITIONER

## 2022-11-16 PROCEDURE — 3066F PR DOCUMENTATION OF TREATMENT FOR NEPHROPATHY: ICD-10-PCS | Mod: CPTII,S$GLB,, | Performed by: NURSE PRACTITIONER

## 2022-11-16 PROCEDURE — 3066F NEPHROPATHY DOC TX: CPT | Mod: CPTII,S$GLB,, | Performed by: NURSE PRACTITIONER

## 2022-11-16 PROCEDURE — 1159F PR MEDICATION LIST DOCUMENTED IN MEDICAL RECORD: ICD-10-PCS | Mod: CPTII,S$GLB,, | Performed by: NURSE PRACTITIONER

## 2022-11-16 PROCEDURE — 3061F PR NEG MICROALBUMINURIA RESULT DOCUMENTED/REVIEW: ICD-10-PCS | Mod: CPTII,S$GLB,, | Performed by: NURSE PRACTITIONER

## 2022-11-16 PROCEDURE — 3008F PR BODY MASS INDEX (BMI) DOCUMENTED: ICD-10-PCS | Mod: CPTII,S$GLB,, | Performed by: NURSE PRACTITIONER

## 2022-11-16 PROCEDURE — 3008F BODY MASS INDEX DOCD: CPT | Mod: CPTII,S$GLB,, | Performed by: NURSE PRACTITIONER

## 2022-11-16 PROCEDURE — 99999 PR PBB SHADOW E&M-EST. PATIENT-LVL IV: ICD-10-PCS | Mod: PBBFAC,,, | Performed by: NURSE PRACTITIONER

## 2022-11-16 PROCEDURE — 1160F RVW MEDS BY RX/DR IN RCRD: CPT | Mod: CPTII,S$GLB,, | Performed by: NURSE PRACTITIONER

## 2022-11-16 PROCEDURE — 95251 PR GLUCOSE MONITOR, 72 HOUR, PHYS INTERP: ICD-10-PCS | Mod: S$GLB,,, | Performed by: NURSE PRACTITIONER

## 2022-11-16 PROCEDURE — 3074F PR MOST RECENT SYSTOLIC BLOOD PRESSURE < 130 MM HG: ICD-10-PCS | Mod: CPTII,S$GLB,, | Performed by: NURSE PRACTITIONER

## 2022-11-16 RX ORDER — LEVOTHYROXINE SODIUM 25 UG/1
25 TABLET ORAL
Qty: 102 TABLET | Refills: 1 | Status: SHIPPED | OUTPATIENT
Start: 2022-11-16 | End: 2023-03-16 | Stop reason: SDUPTHER

## 2022-11-16 RX ORDER — SEMAGLUTIDE 1.34 MG/ML
0.5 INJECTION, SOLUTION SUBCUTANEOUS
Qty: 1 PEN | Refills: 6 | Status: SHIPPED | OUTPATIENT
Start: 2022-11-16 | End: 2023-11-16

## 2022-11-16 RX ORDER — INSULIN DEGLUDEC 200 U/ML
36 INJECTION, SOLUTION SUBCUTANEOUS NIGHTLY
Qty: 1 PEN | Refills: 6 | Status: SHIPPED | OUTPATIENT
Start: 2022-11-16

## 2022-11-16 RX ORDER — ERGOCALCIFEROL 1.25 MG/1
50000 CAPSULE ORAL
Qty: 24 CAPSULE | Refills: 3 | Status: SHIPPED | OUTPATIENT
Start: 2022-11-17 | End: 2023-03-16 | Stop reason: SDUPTHER

## 2022-11-16 RX ORDER — ATORVASTATIN CALCIUM 10 MG/1
10 TABLET, FILM COATED ORAL DAILY
Qty: 90 TABLET | Refills: 3 | Status: SHIPPED | OUTPATIENT
Start: 2022-11-16 | End: 2023-03-16 | Stop reason: SDUPTHER

## 2022-11-16 RX ORDER — INSULIN ASPART 100 [IU]/ML
INJECTION, SOLUTION INTRAVENOUS; SUBCUTANEOUS
Qty: 4 EACH | Refills: 6 | Status: SHIPPED | OUTPATIENT
Start: 2022-11-16 | End: 2023-03-16 | Stop reason: SDUPTHER

## 2022-11-16 NOTE — PROGRESS NOTES
CC:   Chief Complaint   Patient presents with    Diabetes Mellitus       HPI: Etta Bill is a 32 y.o. female presents for a follow up visit today for the management of T1DM.      She was diagnosed with Type 1 diabetes in September 2020 when she went into DKA and was hospitalized. She presented with fatigue, polyuria, polydipsia, nausea and vomiting. BG was 330 on chemistry panel. She was discharged home on insulin therapy ( [facility]).   9/30/2020--LYDIA and anti islet cell antibodies--both positive.  C-peptide low at 0.53 with a fasting glucose of 243--confirms the diagnosis of type 1 diabetes      Family hx of diabetes: grandmother   Hospitalized for diabetes: DKA at diagnosis.     No personal or FH of thyroid cancer or personal of pancreatic cancer or pancreatitis.       She started the insulin pump ( Tandem  T-Slim) on 2/15/2021.  She still feels confident with her carbohydrate counting  Blood sugar readings continue to improve on Dexcom and insulin pump.   Our last visit was in January of 2022  At that visit we adjusted her metformin to a 1000 mg nightly with dinner  We continued her tandem insulin pump with NovoLog in control IQ  She was having some lows after our last visit overnight so we adjusted her correction overnight  Her A1c in July was down to 6.1%  Some blood sugar variability noted on her recent download  She does attest to dietary indiscretions impacting her blood sugar readings  She is frustrated with weight gain      Hypothyroidism due to Hashimoto's-   Started on levothyroxine 50 mcg daily after + antibodies an elevated TSH . Her TSH level was low so we cut back on her LT4 to 50 mcg daily except 1/2 tablet 2 days per week. - however she was taking it incorrectly so we adjusted her levothyroxine to 25 mcg daily and TSH WNL - higher end of normal       Vitamin-D  she is taking Ergo 2 times per week       DIABETES COMPLICATIONS: none      Diabetes Management Status    ASA:  No    Statin: Taking  - Lipitor 10 mg   ACE/ARB: Not taking     Screening or Prevention Patient's value Goal Complete/Controlled?   HgA1C Testing and Control   Lab Results   Component Value Date    HGBA1C 6.1 (H) 07/08/2022      Annually/Less than 8% No   Lipid profile : 01/27/2022 Annually Yes   LDL control Lab Results   Component Value Date    LDLCALC 94.8 01/27/2022    Annually/Less than 100 mg/dl  No   Nephropathy screening Lab Results   Component Value Date    LABMICR 9.0 01/27/2022     Lab Results   Component Value Date    PROTEINUA Negative 09/12/2020    Annually Yes   Blood pressure BP Readings from Last 1 Encounters:   11/16/22 123/74    Less than 140/90 Yes   Dilated retinal exam : 12/10/2020 Annually No   Foot exam   : 11/16/2022 Annually No       CURRENT A1C:    Hemoglobin A1C   Date Value Ref Range Status   07/08/2022 6.1 (H) 4.0 - 5.6 % Final     Comment:     ADA Screening Guidelines:  5.7-6.4%  Consistent with prediabetes  >or=6.5%  Consistent with diabetes    High levels of fetal hemoglobin interfere with the HbA1C  assay. Heterozygous hemoglobin variants (HbS, HgC, etc)do  not significantly interfere with this assay.   However, presence of multiple variants may affect accuracy.     01/27/2022 6.2 (H) 4.0 - 5.6 % Final     Comment:     ADA Screening Guidelines:  5.7-6.4%  Consistent with prediabetes  >or=6.5%  Consistent with diabetes    High levels of fetal hemoglobin interfere with the HbA1C  assay. Heterozygous hemoglobin variants (HbS, HgC, etc)do  not significantly interfere with this assay.   However, presence of multiple variants may affect accuracy.     07/13/2021 6.4 (H) 4.0 - 5.6 % Final     Comment:     ADA Screening Guidelines:  5.7-6.4%  Consistent with prediabetes  >or=6.5%  Consistent with diabetes    High levels of fetal hemoglobin interfere with the HbA1C  assay. Heterozygous hemoglobin variants (HbS, HgC, etc)do  not significantly interfere with this assay.   However, presence of multiple variants may  affect accuracy.         GOAL A1C: 6.5% without hypoglycemia     DM MEDICATIONS USED IN THE PAST: Levemir   Novolog   Tresiba   Fiasp   Tandem TSLim   Metformin     CURRENT DIABETES MEDICATIONS: Metformin  mg with dinner a couple nights per week     Insulin Pump: Tandem T-slim with Novolog  insulin     Pump settings: currently in Control IQ mode   Basal: adjusted basal based on average seen from Control IQ -   MN-3AM:   1.75 units/hr   3AM- 8AM: 1.5 units/hr   8AM- MN: 1.4 units/hr      ICR   MN-5PM:1:5  5PM-MN: 1:4      ISF:   MN- 3AM:1:30   3AM-8AM: 1:30   8AM- 5PM: 1:20  5PM- MN: 1:17      Target: 120 ( but target in control IQ is 110)     IOB: 3 hours ( but control IQ is 5 hours)      Discussed sleep mode- changes target to 130      Temp basals: - she is using the sleep mode     Pump site change: q 2-3 days   Cartridge change: q 2-3 days  Insulin TDD: 88.58 units/day     Basal 42%   Bolus 35% correction bolus:  19%  4% override     Back up Lantus/Levemir: back up Tresiba at home       Patient's pump was downloaded in clinic today and reviewed with patient.   Please see attached documents for pump download.     Pump supplies come from: [employer]        BLOOD GLUCOSE MONITORING:    Sensor type: Dexcom G6   Average BG readin  Estimated A1c:  7.3%   Time in range:  66%  Limits:   Site change:q10 days    Two weeks prior her average blood sugar was 155 and she was in target range 75% of the time with an estimated A1c of 7%    Dexcom supplies from- [employer]    Sensor was downloaded in clinic today and reviewed with patient.   Please see attached document for download.         HYPOGLYCEMIA:  Has improved since last visit  On Dexcom download less than 1% low and less than 1% very low  Has Baqsimi nasal spray   + possible hypoglycemia unawareness on the past before she started the Dexcom. Having bad night sweats that are waking her up from her sleep.       MEALS: eating 3 meals per day    She is CHO  counting   She feels confident with CHO counting   Snack: crackers and cheese and baby bell cheese or almonds and other nuts   Drinks: water   No sugary beverages        CURRENT EXERCISE:  No      Review of Systems  Review of Systems   Constitutional:  Negative for appetite change, fatigue and unexpected weight change.   HENT:  Negative for trouble swallowing.    Eyes:  Negative for visual disturbance.   Respiratory:  Negative for shortness of breath.    Cardiovascular:  Negative for chest pain.   Gastrointestinal:  Negative for abdominal distention, abdominal pain, constipation and nausea.   Endocrine: Negative for polydipsia, polyphagia and polyuria.   Genitourinary:         No Nocturia    Skin:  Negative for wound.   Neurological:  Negative for numbness.       Physical Exam   Physical Exam  Vitals and nursing note reviewed.   Constitutional:       Appearance: She is well-developed. She is obese.   HENT:      Head: Normocephalic and atraumatic.      Right Ear: External ear normal.      Left Ear: External ear normal.      Nose: Nose normal.   Neck:      Thyroid: No thyromegaly.      Trachea: No tracheal deviation.   Cardiovascular:      Rate and Rhythm: Normal rate and regular rhythm.      Heart sounds: No murmur heard.  Pulmonary:      Effort: Pulmonary effort is normal. No respiratory distress.      Breath sounds: Normal breath sounds.   Abdominal:      Palpations: Abdomen is soft.      Tenderness: There is no abdominal tenderness.      Hernia: No hernia is present.   Musculoskeletal:      Cervical back: Normal range of motion and neck supple.   Skin:     General: Skin is warm and dry.      Capillary Refill: Capillary refill takes less than 2 seconds.      Findings: No rash.      Comments: Insulin pump sites and Dexcom sites without complication. No lipo hypertropthy or atrophy     Neurological:      Mental Status: She is alert and oriented to person, place, and time.      Cranial Nerves: No cranial nerve  deficit.   Psychiatric:         Behavior: Behavior normal.         Judgment: Judgment normal.         FOOT EXAMINATION: Appropriate footwear     Protective Sensation (w/ 10 gram monofilament):  Right: Intact  Left: Intact    Visual Inspection:  Normal -  Bilateral and Nails Intact - without Evidence of Foot Deformity- Bilateral    Pedal Pulses:   Right: Present  Left: Present    Posterior tibialis:   Right:Present  Left: Present        Lab Results   Component Value Date    TSH 3.615 07/08/2022           Type 1 diabetes mellitus with hyperglycemia  Uncontrolled based on Dexcom download and pump download  Attest to dietary indiscretions  Frustrated with weight gain  Interested in Ozempic to help with weight loss and with insulin resistance     Much better control with insulin pump therapy.         -- Medication Changes:      Start Ozempic 0.25 mg weekly for 4 weeks & then 0.5 mg weekly thereafter.   Please notify me for any abdominal pain, nausea, vomiting, diarrhea, constipation, acid reflux     Continue the Metformin     Continue the Tandem pump in control IQ       Continue to use Tandem insulin pump with the following settings   With Novolog insulin     Insulin pump settings: continue to remain in Control IQ   Basal:  Continue current basal settings   MN-3AM:   1.75 units/hr -  3AM- 8AM: 1.5 units/hr   8AM- MN: 1.4 units/hr     ICR   MN- 5 PM: 1:5  5PM- MN: 1:4-- consider tightening to 1:3.5 if needed.    ISF:   MN- 3AM:1:30   3AM-8AM: 1:30   8AM- 5PM: 1:20  5PM- MN: 1:17     Target- 100-120     IOB: 3 hours       -- Reviewed goals of therapy are to get the best control we can without hypoglycemia.  -- Reviewed patient's current insulin regimen. Clarified proper insulin dose and timing in relation to meals, etc. Insulin injection sites and proper rotation instructed.    -- Advised frequent self blood glucose monitoring.  Patient encouraged to document glucose results and bring them to every clinic visit. Continue  to use Dexcom   -- Hypoglycemia precautions discussed. Instructed on precautions before driving.    -- Call for Bg repeatedly < 70 or > 180.   -- Close adherence to lifestyle changes recommended.   -- Periodic follow ups for eye evaluations, foot care and dental care suggested.      Type 1 diabetes mellitus with hypoglycemia and without coma  Reviewed hypoglycemia management: Treat with 1/2 glass of juice, 1/2 can regular coke, or 4 glucose tablets.   Monitor and repeat treatment every 15 minutes until BG is >70   Then have a snack, which includes a complex carbohydrate and protein.    - has Baqsimi     -- has improved with pump with control IQ    Class 2 severe obesity due to excess calories with serious comorbidity and body mass index (BMI) of 37.0 to 37.9 in adult  Body mass index is 37.22 kg/m².  Increases insulin resistance.   Discussed DM diet and exercise.   Trying Ozempic     Vitamin D deficiency  Vit D, 25-Hydroxy   Date Value Ref Range Status   07/13/2021 27 (L) 30 - 96 ng/mL Final     Comment:     Vitamin D deficiency.........<10 ng/mL                              Vitamin D insufficiency......10-29 ng/mL       Vitamin D sufficiency........> or equal to 30 ng/mL  Vitamin D toxicity............>100 ng/mL         On Ergo 2 times per week       Hypothyroidism due to Hashimoto's thyroiditis  TPO +   Confirms hypothyroidism       On levothyroxine 25 mcg daily    reinforced take by itself on empty stomach, first thing in the morning and wait at least 30-60 minutes to eat, drink, or take other medications.    TSH level goal 1-2  Adjust levothyroxine to 25 mcg daily and take 1.5 tablets on Saturday        Insulin pump in place  See above           Pump backup plan    If the insulin pump is non functional and discontinued for anticipated more than 20 hours, please give daily injections of:   Long acting insulin Tresiba 36 units daily   Short acting insulin NOvolog  for meals according to carb ratios and sensitivity  factor in the pump. Insulin to carbohdrate ratio 1:5     When the insulin pump is restarted, do not restart basal rates until at least 22 hours after the last long acting insulin injection. You can set a 0% temporary basal setting that will last until this time and use your pump to bolus for meals and correction.     For any technical insulin pump issues, please contact the insulin pump company; the toll free number is printed on the label on the back of the insulin pump.       If your sugar is running high for a few hours and does not respond to two correction doses from the insulin pump, it may mean that you have a bad pump site and the site should be changed       Follow up in about 4 months (around 3/16/2023).  With labs prior         Orders Placed This Encounter   Procedures    Comprehensive Metabolic Panel     Standing Status:   Future     Standing Expiration Date:   5/16/2024    CBC Auto Differential     Standing Status:   Future     Standing Expiration Date:   5/16/2024    Hemoglobin A1C     Standing Status:   Future     Standing Expiration Date:   5/16/2024    Lipid Panel     Standing Status:   Future     Standing Expiration Date:   5/16/2024    TSH     Standing Status:   Future     Standing Expiration Date:   5/16/2024    Microalbumin/Creatinine Ratio, Urine     Standing Status:   Future     Standing Expiration Date:   5/16/2024     Order Specific Question:   Specimen Source     Answer:   Urine         Recommendations were discussed with the patient in detail  The patient verbalized understanding and agrees with the plan outlined as above.     This note was partly generated with [employer] voice recognition software. I apologize for any possible typographical errors.

## 2022-11-16 NOTE — PATIENT INSTRUCTIONS
Ozempic 0.25 mg weekly for 4 weeks & then 0.5 mg weekly thereafter.   Please notify me for any abdominal pain, nausea, vomiting, diarrhea, constipation, acid reflux     Continue the Metformin     Continue the Tandem pump

## 2022-11-17 NOTE — ASSESSMENT & PLAN NOTE
Vit D, 25-Hydroxy   Date Value Ref Range Status   07/13/2021 27 (L) 30 - 96 ng/mL Final     Comment:     Vitamin D deficiency.........<10 ng/mL                              Vitamin D insufficiency......10-29 ng/mL       Vitamin D sufficiency........> or equal to 30 ng/mL  Vitamin D toxicity............>100 ng/mL         On Ergo 2 times per week

## 2022-11-17 NOTE — ASSESSMENT & PLAN NOTE
Uncontrolled based on Dexcom download and pump download  Attest to dietary indiscretions  Frustrated with weight gain  Interested in Ozempic to help with weight loss and with insulin resistance     Much better control with insulin pump therapy.         -- Medication Changes:      Start Ozempic 0.25 mg weekly for 4 weeks & then 0.5 mg weekly thereafter.   Please notify me for any abdominal pain, nausea, vomiting, diarrhea, constipation, acid reflux     Continue the Metformin     Continue the Tandem pump in control IQ       Continue to use Tandem insulin pump with the following settings   With Novolog insulin     Insulin pump settings: continue to remain in Control IQ   Basal:  Continue current basal settings   MN-3AM:   1.75 units/hr -  3AM- 8AM: 1.5 units/hr   8AM- MN: 1.4 units/hr     ICR   MN- 5 PM: 1:5  5PM- MN: 1:4-- consider tightening to 1:3.5 if needed.    ISF:   MN- 3AM:1:30   3AM-8AM: 1:30   8AM- 5PM: 1:20  5PM- MN: 1:17     Target- 100-120     IOB: 3 hours       -- Reviewed goals of therapy are to get the best control we can without hypoglycemia.  -- Reviewed patient's current insulin regimen. Clarified proper insulin dose and timing in relation to meals, etc. Insulin injection sites and proper rotation instructed.    -- Advised frequent self blood glucose monitoring.  Patient encouraged to document glucose results and bring them to every clinic visit. Continue to use Dexcom   -- Hypoglycemia precautions discussed. Instructed on precautions before driving.    -- Call for Bg repeatedly < 70 or > 180.   -- Close adherence to lifestyle changes recommended.   -- Periodic follow ups for eye evaluations, foot care and dental care suggested.

## 2022-11-17 NOTE — ASSESSMENT & PLAN NOTE
TPO +   Confirms hypothyroidism       On levothyroxine 25 mcg daily    reinforced take by itself on empty stomach, first thing in the morning and wait at least 30-60 minutes to eat, drink, or take other medications.    TSH level goal 1-2  Adjust levothyroxine to 25 mcg daily and take 1.5 tablets on Saturday

## 2022-11-17 NOTE — ASSESSMENT & PLAN NOTE
Body mass index is 37.22 kg/m².  Increases insulin resistance.   Discussed DM diet and exercise.   Trying Ozempic

## 2023-03-15 ENCOUNTER — TELEPHONE (OUTPATIENT)
Dept: DIABETES | Facility: CLINIC | Age: 33
End: 2023-03-15
Payer: COMMERCIAL

## 2023-03-16 ENCOUNTER — OFFICE VISIT (OUTPATIENT)
Dept: DIABETES | Facility: CLINIC | Age: 33
End: 2023-03-16
Payer: COMMERCIAL

## 2023-03-16 VITALS
BODY MASS INDEX: 37.91 KG/M2 | SYSTOLIC BLOOD PRESSURE: 112 MMHG | HEIGHT: 61 IN | HEART RATE: 81 BPM | DIASTOLIC BLOOD PRESSURE: 60 MMHG | OXYGEN SATURATION: 98 % | WEIGHT: 200.81 LBS

## 2023-03-16 DIAGNOSIS — Z71.9 HEALTH EDUCATION/COUNSELING: ICD-10-CM

## 2023-03-16 DIAGNOSIS — E55.9 VITAMIN D DEFICIENCY: ICD-10-CM

## 2023-03-16 DIAGNOSIS — Z46.81 INSULIN PUMP FITTING OR ADJUSTMENT: ICD-10-CM

## 2023-03-16 DIAGNOSIS — E03.8 HYPOTHYROIDISM DUE TO HASHIMOTO'S THYROIDITIS: ICD-10-CM

## 2023-03-16 DIAGNOSIS — E66.01 CLASS 2 SEVERE OBESITY DUE TO EXCESS CALORIES WITH SERIOUS COMORBIDITY AND BODY MASS INDEX (BMI) OF 37.0 TO 37.9 IN ADULT: ICD-10-CM

## 2023-03-16 DIAGNOSIS — Z96.41 INSULIN PUMP IN PLACE: ICD-10-CM

## 2023-03-16 DIAGNOSIS — E06.3 HYPOTHYROIDISM DUE TO HASHIMOTO'S THYROIDITIS: ICD-10-CM

## 2023-03-16 DIAGNOSIS — E55.9 VITAMIN D INSUFFICIENCY: ICD-10-CM

## 2023-03-16 DIAGNOSIS — E10.65 TYPE 1 DIABETES MELLITUS WITH HYPERGLYCEMIA: Primary | ICD-10-CM

## 2023-03-16 DIAGNOSIS — E10.649 TYPE 1 DIABETES MELLITUS WITH HYPOGLYCEMIA AND WITHOUT COMA: ICD-10-CM

## 2023-03-16 PROCEDURE — 99214 PR OFFICE/OUTPT VISIT, EST, LEVL IV, 30-39 MIN: ICD-10-PCS | Mod: S$GLB,,, | Performed by: NURSE PRACTITIONER

## 2023-03-16 PROCEDURE — 99999 PR PBB SHADOW E&M-EST. PATIENT-LVL IV: ICD-10-PCS | Mod: PBBFAC,,, | Performed by: NURSE PRACTITIONER

## 2023-03-16 PROCEDURE — 3066F NEPHROPATHY DOC TX: CPT | Mod: CPTII,S$GLB,, | Performed by: NURSE PRACTITIONER

## 2023-03-16 PROCEDURE — 3074F PR MOST RECENT SYSTOLIC BLOOD PRESSURE < 130 MM HG: ICD-10-PCS | Mod: CPTII,S$GLB,, | Performed by: NURSE PRACTITIONER

## 2023-03-16 PROCEDURE — 3061F NEG MICROALBUMINURIA REV: CPT | Mod: CPTII,S$GLB,, | Performed by: NURSE PRACTITIONER

## 2023-03-16 PROCEDURE — 95251 PR GLUCOSE MONITOR, 72 HOUR, PHYS INTERP: ICD-10-PCS | Mod: S$GLB,,, | Performed by: NURSE PRACTITIONER

## 2023-03-16 PROCEDURE — 95251 CONT GLUC MNTR ANALYSIS I&R: CPT | Mod: S$GLB,,, | Performed by: NURSE PRACTITIONER

## 2023-03-16 PROCEDURE — 1159F MED LIST DOCD IN RCRD: CPT | Mod: CPTII,S$GLB,, | Performed by: NURSE PRACTITIONER

## 2023-03-16 PROCEDURE — 3078F DIAST BP <80 MM HG: CPT | Mod: CPTII,S$GLB,, | Performed by: NURSE PRACTITIONER

## 2023-03-16 PROCEDURE — 3044F HG A1C LEVEL LT 7.0%: CPT | Mod: CPTII,S$GLB,, | Performed by: NURSE PRACTITIONER

## 2023-03-16 PROCEDURE — 1160F RVW MEDS BY RX/DR IN RCRD: CPT | Mod: CPTII,S$GLB,, | Performed by: NURSE PRACTITIONER

## 2023-03-16 PROCEDURE — 3078F PR MOST RECENT DIASTOLIC BLOOD PRESSURE < 80 MM HG: ICD-10-PCS | Mod: CPTII,S$GLB,, | Performed by: NURSE PRACTITIONER

## 2023-03-16 PROCEDURE — 3074F SYST BP LT 130 MM HG: CPT | Mod: CPTII,S$GLB,, | Performed by: NURSE PRACTITIONER

## 2023-03-16 PROCEDURE — 99214 OFFICE O/P EST MOD 30 MIN: CPT | Mod: S$GLB,,, | Performed by: NURSE PRACTITIONER

## 2023-03-16 PROCEDURE — 3061F PR NEG MICROALBUMINURIA RESULT DOCUMENTED/REVIEW: ICD-10-PCS | Mod: CPTII,S$GLB,, | Performed by: NURSE PRACTITIONER

## 2023-03-16 PROCEDURE — 3044F PR MOST RECENT HEMOGLOBIN A1C LEVEL <7.0%: ICD-10-PCS | Mod: CPTII,S$GLB,, | Performed by: NURSE PRACTITIONER

## 2023-03-16 PROCEDURE — 3066F PR DOCUMENTATION OF TREATMENT FOR NEPHROPATHY: ICD-10-PCS | Mod: CPTII,S$GLB,, | Performed by: NURSE PRACTITIONER

## 2023-03-16 PROCEDURE — 1159F PR MEDICATION LIST DOCUMENTED IN MEDICAL RECORD: ICD-10-PCS | Mod: CPTII,S$GLB,, | Performed by: NURSE PRACTITIONER

## 2023-03-16 PROCEDURE — 99999 PR PBB SHADOW E&M-EST. PATIENT-LVL IV: CPT | Mod: PBBFAC,,, | Performed by: NURSE PRACTITIONER

## 2023-03-16 PROCEDURE — 3008F BODY MASS INDEX DOCD: CPT | Mod: CPTII,S$GLB,, | Performed by: NURSE PRACTITIONER

## 2023-03-16 PROCEDURE — 3008F PR BODY MASS INDEX (BMI) DOCUMENTED: ICD-10-PCS | Mod: CPTII,S$GLB,, | Performed by: NURSE PRACTITIONER

## 2023-03-16 PROCEDURE — 1160F PR REVIEW ALL MEDS BY PRESCRIBER/CLIN PHARMACIST DOCUMENTED: ICD-10-PCS | Mod: CPTII,S$GLB,, | Performed by: NURSE PRACTITIONER

## 2023-03-16 RX ORDER — INSULIN ASPART 100 [IU]/ML
INJECTION, SOLUTION INTRAVENOUS; SUBCUTANEOUS
Qty: 4 EACH | Refills: 6 | Status: SHIPPED | OUTPATIENT
Start: 2023-03-16 | End: 2023-09-05 | Stop reason: SDUPTHER

## 2023-03-16 RX ORDER — METFORMIN HYDROCHLORIDE 500 MG/1
500 TABLET, EXTENDED RELEASE ORAL
Qty: 90 TABLET | Refills: 2 | Status: SHIPPED | OUTPATIENT
Start: 2023-03-16 | End: 2024-03-25 | Stop reason: SDUPTHER

## 2023-03-16 RX ORDER — ATORVASTATIN CALCIUM 10 MG/1
10 TABLET, FILM COATED ORAL DAILY
Qty: 90 TABLET | Refills: 3 | Status: SHIPPED | OUTPATIENT
Start: 2023-03-16 | End: 2024-02-14

## 2023-03-16 RX ORDER — LEVOTHYROXINE SODIUM 25 UG/1
25 TABLET ORAL
Qty: 102 TABLET | Refills: 2 | Status: SHIPPED | OUTPATIENT
Start: 2023-03-16 | End: 2023-09-05 | Stop reason: SDUPTHER

## 2023-03-16 RX ORDER — ERGOCALCIFEROL 1.25 MG/1
50000 CAPSULE ORAL
Qty: 24 CAPSULE | Refills: 3 | Status: SHIPPED | OUTPATIENT
Start: 2023-03-16 | End: 2024-03-25

## 2023-03-16 NOTE — PROGRESS NOTES
CC:   Chief Complaint   Patient presents with    Diabetes Mellitus       HPI: Etta Bill is a 33 y.o. female presents for a follow up visit today for the management of T1DM.      She was diagnosed with Type 1 diabetes in September 2020 when she went into DKA and was hospitalized. She presented with fatigue, polyuria, polydipsia, nausea and vomiting. BG was 330 on chemistry panel. She was discharged home on insulin therapy ( [facility]).   9/30/2020--LYDIA and anti islet cell antibodies--both positive.  C-peptide low at 0.53 with a fasting glucose of 243--confirms the diagnosis of type 1 diabetes      Family hx of diabetes: grandmother   Hospitalized for diabetes: DKA at diagnosis.     No personal or FH of thyroid cancer or personal of pancreatic cancer or pancreatitis.       She started the insulin pump ( Tandem  T-Slim) on 2/15/2021.  She still feels confident with her carbohydrate counting  Our last visit was in November 2022   At that visit we added in Ozempic- but it was not approved buy her insurance -- denied because she is Type 1   She stopped taking the metformin since last visit   Recent A1c of 6.5%   Sometimes having post prandial excursions due to eating when having hypoglycemia and not bolusing for CHO intake-- causes spike and then sometimes has lows from correction treating post prandial excursions  Was recently drinking elderberry juice in the AM and not bolusing for it   See attached downloads       Hypothyroidism due to Hashimoto's-   Started on levothyroxine 50 mcg daily after + antibodies an elevated TSH . Her TSH level was low so we cut back on her LT4 to 50 mcg daily except 1/2 tablet 2 days per week. - however she was taking it incorrectly   Now on LT4 25 mcg daily -- 1 whole tablet- except 1.5 tablets on Saturday --- TSH now slightly above goal and taking it correctly   We will increase to 1.5 tablets on Wednesday and Saturdays       Vitamin-D  she is taking Ergo 2 times per week     LDL  and total cholesterol above goal- she was off the Lipitor           DIABETES COMPLICATIONS: none      Diabetes Management Status    ASA:  No    Statin: was off the Lipitor 10 mg   ACE/ARB: Not taking     Screening or Prevention Patient's value Goal Complete/Controlled?   HgA1C Testing and Control   Lab Results   Component Value Date    HGBA1C 6.5 (H) 03/09/2023      Annually/Less than 8% No   Lipid profile : 03/09/2023 Annually Yes   LDL control Lab Results   Component Value Date    LDLCALC 138.2 03/09/2023    Annually/Less than 100 mg/dl  No   Nephropathy screening Lab Results   Component Value Date    LABMICR 14.0 03/09/2023     Lab Results   Component Value Date    PROTEINUA Negative 09/12/2020    Annually Yes   Blood pressure BP Readings from Last 1 Encounters:   03/16/23 112/60    Less than 140/90 Yes   Dilated retinal exam : 12/10/2020 Annually No   Foot exam   : 11/16/2022 Annually No       CURRENT A1C:    Hemoglobin A1C   Date Value Ref Range Status   03/09/2023 6.5 (H) 4.0 - 5.6 % Final     Comment:     ADA Screening Guidelines:  5.7-6.4%  Consistent with prediabetes  >or=6.5%  Consistent with diabetes    High levels of fetal hemoglobin interfere with the HbA1C  assay. Heterozygous hemoglobin variants (HbS, HgC, etc)do  not significantly interfere with this assay.   However, presence of multiple variants may affect accuracy.     07/08/2022 6.1 (H) 4.0 - 5.6 % Final     Comment:     ADA Screening Guidelines:  5.7-6.4%  Consistent with prediabetes  >or=6.5%  Consistent with diabetes    High levels of fetal hemoglobin interfere with the HbA1C  assay. Heterozygous hemoglobin variants (HbS, HgC, etc)do  not significantly interfere with this assay.   However, presence of multiple variants may affect accuracy.     01/27/2022 6.2 (H) 4.0 - 5.6 % Final     Comment:     ADA Screening Guidelines:  5.7-6.4%  Consistent with prediabetes  >or=6.5%  Consistent with diabetes    High levels of fetal hemoglobin interfere  with the HbA1C  assay. Heterozygous hemoglobin variants (HbS, HgC, etc)do  not significantly interfere with this assay.   However, presence of multiple variants may affect accuracy.         GOAL A1C: 6.5% without hypoglycemia     DM MEDICATIONS USED IN THE PAST: Levemir   Novolog   Tresiba   Fiasp   Tandem TSLim   Metformin     CURRENT DIABETES MEDICATIONS: Metformin  mg with dinner a couple nights per week     Insulin Pump: Tandem T-slim with Novolog  insulin     Pump settings: currently in Control IQ mode   Basal: adjusted basal based on average seen from Control IQ -   MN-3AM:   1.75 units/hr   3AM- 8AM: 1.5 units/hr   8AM- MN: 1.4 units/hr      ICR   MN-5PM:1:5  5PM-MN: 1:4      ISF:   MN- 3AM:1:30   3AM-8AM: 1:30   8AM- 5PM: 1:20  5PM- MN: 1:17      Target: 120 ( but target in control IQ is 110)     IOB: 3 hours ( but control IQ is 5 hours)      Discussed sleep mode- changes target to 130      Temp basals: - she is using the sleep mode     Pump site change: q 2-3 days   Cartridge change: q 2-3 days  Insulin TDD: 79.77 units/day     Basal 47%   Bolus 32 correction bolus:  5%  Control IQ correction- 15%  Control IQ only 78%    Back up Lantus/Levemir: back up Tresiba at home       Patient's pump was downloaded in clinic today and reviewed with patient.   Please see attached documents for pump download.     Pump supplies come from: [employer]        BLOOD GLUCOSE MONITORING:    Sensor type: Dexcom G6   Average BG readin  Estimated A1c:  7.3%   Time in range:  64%  Site change:q10 days    Two weeks prior her average blood sugar was 177 and she was in target range 60% of the time with an estimated A1c of 7.6 %    Dexcom supplies from- [employer]    Sensor was downloaded in clinic today and reviewed with patient.   Please see attached document for download.         HYPOGLYCEMIA:    On Dexcom download less than 1% low and less than 0% very low  Has Baqsimi nasal spray   + possible hypoglycemia unawareness on  the past before she started the Dexcom. Having bad night sweats that are waking her up from her sleep.       MEALS: eating 3 meals per day    She is CHO counting   She feels confident with CHO counting   Snack: crackers and cheese and baby bell cheese or almonds and other nuts   Drinks: water   No sugary beverages        CURRENT EXERCISE:  No      Review of Systems  Review of Systems   Constitutional:  Negative for appetite change, fatigue and unexpected weight change.   HENT:  Negative for trouble swallowing.    Eyes:  Negative for visual disturbance.   Respiratory:  Negative for shortness of breath.    Cardiovascular:  Negative for chest pain.   Gastrointestinal:  Negative for abdominal distention, abdominal pain, constipation and nausea.   Endocrine: Negative for polydipsia, polyphagia and polyuria.   Genitourinary:         No Nocturia    Skin:  Negative for wound.   Neurological:  Negative for numbness.       Physical Exam   Physical Exam  Vitals and nursing note reviewed.   Constitutional:       Appearance: She is well-developed. She is obese.   HENT:      Head: Normocephalic and atraumatic.      Right Ear: External ear normal.      Left Ear: External ear normal.      Nose: Nose normal.   Neck:      Thyroid: No thyromegaly.      Trachea: No tracheal deviation.   Cardiovascular:      Rate and Rhythm: Normal rate and regular rhythm.      Heart sounds: No murmur heard.  Pulmonary:      Effort: Pulmonary effort is normal. No respiratory distress.      Breath sounds: Normal breath sounds.   Abdominal:      Palpations: Abdomen is soft.      Tenderness: There is no abdominal tenderness.      Hernia: No hernia is present.   Musculoskeletal:      Cervical back: Normal range of motion and neck supple.   Skin:     General: Skin is warm and dry.      Capillary Refill: Capillary refill takes less than 2 seconds.      Findings: No rash.      Comments: Insulin pump sites and Dexcom sites without complication. No lipo  hypertropthy or atrophy     Neurological:      Mental Status: She is alert and oriented to person, place, and time.      Cranial Nerves: No cranial nerve deficit.   Psychiatric:         Behavior: Behavior normal.         Judgment: Judgment normal.         FOOT EXAMINATION: Appropriate footwear     Protective Sensation (w/ 10 gram monofilament):  Right: Intact  Left: Intact    Visual Inspection:  Normal -  Bilateral and Nails Intact - without Evidence of Foot Deformity- Bilateral    Pedal Pulses:   Right: Present  Left: Present    Posterior tibialis:   Right:Present  Left: Present        Lab Results   Component Value Date    TSH 4.010 (H) 03/09/2023           Type 1 diabetes mellitus with hyperglycemia  Uncontrolled based on Dexcom download and pump download  Attest to dietary indiscretions  Frustrated with weight gain  Tried Ozempic at last visit but insurance denied because she is T1DM   She stopped the Metformin     Much better control with insulin pump therapy.         -- Medication Changes:      Will try to appeal Ozempic decision     Restart Metformin 500 mg with dinner     Continue the Tandem pump in control IQ   Try to stay in control IQ majority of the time       Continue to use Tandem insulin pump with the following settings   With Novolog insulin     Insulin pump settings: continue to remain in Control IQ   Basal:  Continue current basal settings   MN-3AM:   2 units/hr -increased based on pump download   3AM- 8AM: 1.5 units/hr   8AM- MN: 1.4 units/hr     ICR   MN- 5 PM: 1:5  5PM- MN: 1:4--    ISF:   MN- 3AM:1:30   3AM-8AM: 1:30   8AM- 5PM: 1:20  5PM- MN: 1:17     Target- 100-120     IOB: 3 hours       -- Reviewed goals of therapy are to get the best control we can without hypoglycemia.  -- Reviewed patient's current insulin regimen. Clarified proper insulin dose and timing in relation to meals, etc. Insulin injection sites and proper rotation instructed.    -- Advised frequent self blood glucose  monitoring.  Patient encouraged to document glucose results and bring them to every clinic visit. Continue to use Dexcom   -- Hypoglycemia precautions discussed. Instructed on precautions before driving.    -- Call for Bg repeatedly < 70 or > 180.   -- Close adherence to lifestyle changes recommended.   -- Periodic follow ups for eye evaluations, foot care and dental care suggested.      Type 1 diabetes mellitus with hypoglycemia and without coma  Reviewed hypoglycemia management: Treat with 1/2 glass of juice, 1/2 can regular coke, or 4 glucose tablets.   Monitor and repeat treatment every 15 minutes until BG is >70   Then have a snack, which includes a complex carbohydrate and protein.    - has Baqsimi     -- has improved with pump with control IQ    Reviewed proper treatment and what to do following hypoglycemia before a meal     Class 2 severe obesity due to excess calories with serious comorbidity and body mass index (BMI) of 37.0 to 37.9 in adult  Body mass index is 37.94 kg/m².  Increases insulin resistance.   Discussed DM diet and exercise.       Vitamin D deficiency  Vit D, 25-Hydroxy   Date Value Ref Range Status   07/13/2021 27 (L) 30 - 96 ng/mL Final     Comment:     Vitamin D deficiency.........<10 ng/mL                              Vitamin D insufficiency......10-29 ng/mL       Vitamin D sufficiency........> or equal to 30 ng/mL  Vitamin D toxicity............>100 ng/mL         On Ergo 2 times per week       Hypothyroidism due to Hashimoto's thyroiditis  TPO +   Confirms hypothyroidism     On levothyroxine 25 mcg daily    reinforced take by itself on empty stomach, first thing in the morning and wait at least 30-60 minutes to eat, drink, or take other medications.    TSH level goal 1-2  Adjust levothyroxine to 25 mcg daily and take 1.5 tablets on Saturday and Wednesdays        Insulin pump fitting or adjustment  See above     Insulin pump in place  See above             Pump backup plan    If the  insulin pump is non functional and discontinued for anticipated more than 20 hours, please give daily injections of:   Long acting insulin Tresiba 36 units daily   Short acting insulin NOvolog  for meals according to carb ratios and sensitivity factor in the pump. Insulin to carbohdrate ratio 1:5     When the insulin pump is restarted, do not restart basal rates until at least 22 hours after the last long acting insulin injection. You can set a 0% temporary basal setting that will last until this time and use your pump to bolus for meals and correction.     For any technical insulin pump issues, please contact the insulin pump company; the toll free number is printed on the label on the back of the insulin pump.       If your sugar is running high for a few hours and does not respond to two correction doses from the insulin pump, it may mean that you have a bad pump site and the site should be changed       Follow up in about 4 months (around 7/16/2023).  See if we can appeal the decision for the ozempic-- using it for insulin resistance   Follow up with me in 4 months with labs prior       Orders Placed This Encounter   Procedures    Comprehensive Metabolic Panel     Standing Status:   Future     Standing Expiration Date:   9/16/2024    TSH     Standing Status:   Future     Standing Expiration Date:   9/16/2024    Lipid Panel     Standing Status:   Future     Standing Expiration Date:   9/16/2024    Hemoglobin A1C     Standing Status:   Future     Standing Expiration Date:   9/16/2024         Recommendations were discussed with the patient in detail  The patient verbalized understanding and agrees with the plan outlined as above.     This note was partly generated with [employer] voice recognition software. I apologize for any possible typographical errors.

## 2023-03-20 NOTE — ASSESSMENT & PLAN NOTE
Reviewed hypoglycemia management: Treat with 1/2 glass of juice, 1/2 can regular coke, or 4 glucose tablets.   Monitor and repeat treatment every 15 minutes until BG is >70   Then have a snack, which includes a complex carbohydrate and protein.    - has Baqsimi     -- has improved with pump with control IQ    Reviewed proper treatment and what to do following hypoglycemia before a meal

## 2023-03-20 NOTE — ASSESSMENT & PLAN NOTE
Body mass index is 37.94 kg/m².  Increases insulin resistance.   Discussed DM diet and exercise.

## 2023-03-20 NOTE — ASSESSMENT & PLAN NOTE
TPO +   Confirms hypothyroidism     On levothyroxine 25 mcg daily    reinforced take by itself on empty stomach, first thing in the morning and wait at least 30-60 minutes to eat, drink, or take other medications.    TSH level goal 1-2  Adjust levothyroxine to 25 mcg daily and take 1.5 tablets on Saturday and Wednesdays

## 2023-03-20 NOTE — ASSESSMENT & PLAN NOTE
Uncontrolled based on Dexcom download and pump download  Attest to dietary indiscretions  Frustrated with weight gain  Tried Ozempic at last visit but insurance denied because she is T1DM   She stopped the Metformin     Much better control with insulin pump therapy.         -- Medication Changes:      Will try to appeal Ozempic decision     Restart Metformin 500 mg with dinner     Continue the Tandem pump in control IQ   Try to stay in control IQ majority of the time       Continue to use Tandem insulin pump with the following settings   With Novolog insulin     Insulin pump settings: continue to remain in Control IQ   Basal:  Continue current basal settings   MN-3AM:   2 units/hr -increased based on pump download   3AM- 8AM: 1.5 units/hr   8AM- MN: 1.4 units/hr     ICR   MN- 5 PM: 1:5  5PM- MN: 1:4--    ISF:   MN- 3AM:1:30   3AM-8AM: 1:30   8AM- 5PM: 1:20  5PM- MN: 1:17     Target- 100-120     IOB: 3 hours       -- Reviewed goals of therapy are to get the best control we can without hypoglycemia.  -- Reviewed patient's current insulin regimen. Clarified proper insulin dose and timing in relation to meals, etc. Insulin injection sites and proper rotation instructed.    -- Advised frequent self blood glucose monitoring.  Patient encouraged to document glucose results and bring them to every clinic visit. Continue to use Dexcom   -- Hypoglycemia precautions discussed. Instructed on precautions before driving.    -- Call for Bg repeatedly < 70 or > 180.   -- Close adherence to lifestyle changes recommended.   -- Periodic follow ups for eye evaluations, foot care and dental care suggested.

## 2023-03-28 ENCOUNTER — TELEPHONE (OUTPATIENT)
Dept: DIABETES | Facility: CLINIC | Age: 33
End: 2023-03-28
Payer: COMMERCIAL

## 2023-07-14 ENCOUNTER — PATIENT MESSAGE (OUTPATIENT)
Dept: DIABETES | Facility: CLINIC | Age: 33
End: 2023-07-14
Payer: COMMERCIAL

## 2023-09-05 ENCOUNTER — PATIENT MESSAGE (OUTPATIENT)
Dept: DIABETES | Facility: CLINIC | Age: 33
End: 2023-09-05
Payer: COMMERCIAL

## 2023-09-05 DIAGNOSIS — E06.3 HYPOTHYROIDISM DUE TO HASHIMOTO'S THYROIDITIS: ICD-10-CM

## 2023-09-05 DIAGNOSIS — E03.8 HYPOTHYROIDISM DUE TO HASHIMOTO'S THYROIDITIS: ICD-10-CM

## 2023-09-05 DIAGNOSIS — E10.65 TYPE 1 DIABETES MELLITUS WITH HYPERGLYCEMIA: ICD-10-CM

## 2023-09-05 RX ORDER — INSULIN ASPART 100 [IU]/ML
INJECTION, SOLUTION INTRAVENOUS; SUBCUTANEOUS
Qty: 4 EACH | Refills: 6 | Status: SHIPPED | OUTPATIENT
Start: 2023-09-05 | End: 2024-04-02 | Stop reason: SDUPTHER

## 2023-09-05 RX ORDER — LEVOTHYROXINE SODIUM 25 UG/1
25 TABLET ORAL
Qty: 102 TABLET | Refills: 2 | Status: SHIPPED | OUTPATIENT
Start: 2023-09-05 | End: 2024-03-25 | Stop reason: SDUPTHER

## 2023-11-12 ENCOUNTER — PATIENT MESSAGE (OUTPATIENT)
Dept: DIABETES | Facility: CLINIC | Age: 33
End: 2023-11-12
Payer: COMMERCIAL

## 2024-02-14 RX ORDER — ATORVASTATIN CALCIUM 10 MG/1
10 TABLET, FILM COATED ORAL NIGHTLY
Qty: 90 TABLET | Refills: 3 | Status: SHIPPED | OUTPATIENT
Start: 2024-02-14 | End: 2024-03-25 | Stop reason: SDUPTHER

## 2024-03-25 ENCOUNTER — OFFICE VISIT (OUTPATIENT)
Dept: DIABETES | Facility: CLINIC | Age: 34
End: 2024-03-25
Payer: COMMERCIAL

## 2024-03-25 VITALS
SYSTOLIC BLOOD PRESSURE: 119 MMHG | WEIGHT: 194.81 LBS | OXYGEN SATURATION: 98 % | HEIGHT: 61 IN | BODY MASS INDEX: 36.78 KG/M2 | DIASTOLIC BLOOD PRESSURE: 61 MMHG | HEART RATE: 90 BPM

## 2024-03-25 DIAGNOSIS — E10.65 TYPE 1 DIABETES MELLITUS WITH HYPERGLYCEMIA: Primary | ICD-10-CM

## 2024-03-25 DIAGNOSIS — E06.3 HYPOTHYROIDISM DUE TO HASHIMOTO'S THYROIDITIS: ICD-10-CM

## 2024-03-25 DIAGNOSIS — E55.9 VITAMIN D DEFICIENCY: ICD-10-CM

## 2024-03-25 DIAGNOSIS — Z71.9 HEALTH EDUCATION/COUNSELING: ICD-10-CM

## 2024-03-25 DIAGNOSIS — E03.8 HYPOTHYROIDISM DUE TO HASHIMOTO'S THYROIDITIS: ICD-10-CM

## 2024-03-25 DIAGNOSIS — E66.01 CLASS 2 SEVERE OBESITY DUE TO EXCESS CALORIES WITH SERIOUS COMORBIDITY AND BODY MASS INDEX (BMI) OF 36.0 TO 36.9 IN ADULT: ICD-10-CM

## 2024-03-25 DIAGNOSIS — E78.5 DYSLIPIDEMIA, GOAL LDL BELOW 100: ICD-10-CM

## 2024-03-25 DIAGNOSIS — Z96.41 INSULIN PUMP IN PLACE: ICD-10-CM

## 2024-03-25 DIAGNOSIS — Z46.81 INSULIN PUMP FITTING OR ADJUSTMENT: ICD-10-CM

## 2024-03-25 DIAGNOSIS — E10.649 TYPE 1 DIABETES MELLITUS WITH HYPOGLYCEMIA AND WITHOUT COMA: ICD-10-CM

## 2024-03-25 PROCEDURE — 3074F SYST BP LT 130 MM HG: CPT | Mod: CPTII,S$GLB,, | Performed by: NURSE PRACTITIONER

## 2024-03-25 PROCEDURE — 3078F DIAST BP <80 MM HG: CPT | Mod: CPTII,S$GLB,, | Performed by: NURSE PRACTITIONER

## 2024-03-25 PROCEDURE — 1159F MED LIST DOCD IN RCRD: CPT | Mod: CPTII,S$GLB,, | Performed by: NURSE PRACTITIONER

## 2024-03-25 PROCEDURE — 99999 PR PBB SHADOW E&M-EST. PATIENT-LVL V: CPT | Mod: PBBFAC,,, | Performed by: NURSE PRACTITIONER

## 2024-03-25 PROCEDURE — 95251 CONT GLUC MNTR ANALYSIS I&R: CPT | Mod: S$GLB,,, | Performed by: NURSE PRACTITIONER

## 2024-03-25 PROCEDURE — 3008F BODY MASS INDEX DOCD: CPT | Mod: CPTII,S$GLB,, | Performed by: NURSE PRACTITIONER

## 2024-03-25 PROCEDURE — 99214 OFFICE O/P EST MOD 30 MIN: CPT | Mod: S$GLB,,, | Performed by: NURSE PRACTITIONER

## 2024-03-25 PROCEDURE — 1160F RVW MEDS BY RX/DR IN RCRD: CPT | Mod: CPTII,S$GLB,, | Performed by: NURSE PRACTITIONER

## 2024-03-25 RX ORDER — INSULIN PMP CART,AUT,G6/7,CNTR
1 EACH SUBCUTANEOUS
Qty: 3 EACH | Refills: 11 | Status: SHIPPED | OUTPATIENT
Start: 2024-03-25 | End: 2024-03-26 | Stop reason: SDUPTHER

## 2024-03-25 RX ORDER — METFORMIN HYDROCHLORIDE 500 MG/1
500 TABLET, EXTENDED RELEASE ORAL 2 TIMES DAILY WITH MEALS
Qty: 180 TABLET | Refills: 2 | Status: SHIPPED | OUTPATIENT
Start: 2024-03-25

## 2024-03-25 RX ORDER — LEVOTHYROXINE SODIUM 25 UG/1
25 TABLET ORAL
Qty: 102 TABLET | Refills: 2 | Status: SHIPPED | OUTPATIENT
Start: 2024-03-25

## 2024-03-25 RX ORDER — INSULIN PMP CART,AUT,G6/7,CNTR
1 EACH SUBCUTANEOUS
Qty: 1 EACH | Refills: 0 | Status: SHIPPED | OUTPATIENT
Start: 2024-03-25 | End: 2024-03-26 | Stop reason: SDUPTHER

## 2024-03-25 RX ORDER — ATORVASTATIN CALCIUM 10 MG/1
10 TABLET, FILM COATED ORAL NIGHTLY
Qty: 90 TABLET | Refills: 3 | Status: SHIPPED | OUTPATIENT
Start: 2024-03-25

## 2024-03-25 RX ORDER — INSULIN ASPART INJECTION 100 [IU]/ML
INJECTION, SOLUTION SUBCUTANEOUS
Qty: 40 ML | Refills: 11 | Status: SHIPPED | OUTPATIENT
Start: 2024-03-25

## 2024-03-25 NOTE — ASSESSMENT & PLAN NOTE
Uncontrolled based on Dexcom download and pump download-- she is due to for labs   Attest to dietary indiscretions-- knows she needs to cut back   Frustrated with weight gain  Tried Ozempic at last visit but insurance denied because she is T1DM   Much better control with insulin pump therapy.   She would like to try the omnipod 5 AID       -- Medication Changes:      Adjust Metformin to 500 mg BID       Change pump to Omnipod 5   Use Fiasp insulin in Omnipod 5       Insulin pump settings: continue to remain in auto mode   Basal:  Continue current basal settings   MN-3AM:   2 units/hr  3AM- 8AM: 1.5 units/hr   8AM- MN: 1.4 units/hr     ICR   MN- 5 PM: 1:5  5PM- MN: 1:4--    ISF:   MN- 3AM:1:25- tightened    3AM-8AM: 1:25-- tightened   8AM- 5PM: 1:20  5PM- MN: 1:17     Target- 100-120     IOB: 3 hours       -- Reviewed goals of therapy are to get the best control we can without hypoglycemia.  -- Reviewed patient's current insulin regimen. Clarified proper insulin dose and timing in relation to meals, etc. Insulin injection sites and proper rotation instructed.    -- Advised frequent self blood glucose monitoring.  Patient encouraged to document glucose results and bring them to every clinic visit. Continue to use Dexcom g6   -- Hypoglycemia precautions discussed. Instructed on precautions before driving.    -- Call for Bg repeatedly < 70 or > 180.   -- Close adherence to lifestyle changes recommended.   -- Periodic follow ups for eye evaluations, foot care and dental care suggested.  -- see diabetes education-- Omnipod 5 start   See yifan 7-10 days later for omnipod 5 download and review       Patient has diabetes mellitus and manages diabetes with intensive insulin regimen and uses prandial and basal insulin daily-- via pump   Patient requires a therapeutic CGM and is willing to use therapeutic CGM for the necessary frequent adjustments of insulin therapy.  I have completed an in-person visit during the previous 6  months and will continue to have in-person visits every 6 months to assess adherence to their CGM regimen and diabetes treatment plan.  Due to COVID pandemic and need for remote monitoring this tool is medically necessary

## 2024-03-25 NOTE — ASSESSMENT & PLAN NOTE
TPO +   Confirms hypothyroidism     On levothyroxine 25 mcg daily and 1.5 tablets on Saturday and Wednesdays    reinforced take by itself on empty stomach, first thing in the morning and wait at least 30-60 minutes to eat, drink, or take other medications.    Check TSH

## 2024-03-25 NOTE — ASSESSMENT & PLAN NOTE
Body mass index is 36.81 kg/m².  Increases insulin resistance.   Discussed DM diet and exercise.

## 2024-03-25 NOTE — PROGRESS NOTES
CC:   Chief Complaint   Patient presents with    Diabetes Mellitus       HPI: Etta Bill is a 34 y.o. female presents for a follow up visit today for the management of T1DM.      She was diagnosed with Type 1 diabetes in September 2020 when she went into DKA and was hospitalized. She presented with fatigue, polyuria, polydipsia, nausea and vomiting. BG was 330 on chemistry panel. She was discharged home on insulin therapy ( [facility]).   9/30/2020--LYDIA and anti islet cell antibodies--both positive.  C-peptide low at 0.53 with a fasting glucose of 243--confirms the diagnosis of type 1 diabetes      Family hx of diabetes: grandmother   Hospitalized for diabetes: DKA at diagnosis.     No personal or FH of thyroid cancer or personal of pancreatic cancer or pancreatitis.       She started the insulin pump ( Tandem  T-Slim) on 2/15/2021.  She still feels confident with her carbohydrate counting  Our last visit was in March of 2023  At that visit we adjust her basal rate overnight based on her pump download  Continued her tandem insulin pump  Continue to use her Dexcom  We tried to appeal the decision for the Ozempic denial  We encouraged her to restart her metformin 500 with dinner  Continued her tandem pump and control IQ with NovoLog insulin  No recent lab work on file  See attached downloads      Hypothyroidism due to Hashimoto's-   Started on levothyroxine 50 mcg daily after + antibodies an elevated TSH . Her TSH level was low so we cut back on her LT4 to 50 mcg daily except 1/2 tablet 2 days per week. - however she was taking it incorrectly   Now on LT4 25 mcg daily -- 1 whole tablet- except 1.5 tablets on Saturdays and Wednesdays ---      Vitamin-D  she is taking OTC vitamin D                       DIABETES COMPLICATIONS: none      Diabetes Management Status    ASA:  No    Statin: taking Lipitor 10 mg nightly   ACE/ARB: Not taking     Screening or Prevention Patient's value Goal Complete/Controlled?   HgA1C  Testing and Control   Lab Results   Component Value Date    HGBA1C 6.2 (H) 07/08/2023      Annually/Less than 8% No   Lipid profile : 07/08/2023 Annually Yes   LDL control Lab Results   Component Value Date    LDLCALC 88.2 07/08/2023    Annually/Less than 100 mg/dl  No   Nephropathy screening Lab Results   Component Value Date    LABMICR 14.0 03/09/2023     Lab Results   Component Value Date    PROTEINUA Negative 09/12/2020    Annually Yes   Blood pressure BP Readings from Last 1 Encounters:   03/25/24 119/61    Less than 140/90 Yes   Dilated retinal exam : 12/10/2020 Annually No   Foot exam   : 11/16/2022 Annually No       CURRENT A1C:    Hemoglobin A1C   Date Value Ref Range Status   07/08/2023 6.2 (H) 4.0 - 5.6 % Final     Comment:     ADA Screening Guidelines:  5.7-6.4%  Consistent with prediabetes  >or=6.5%  Consistent with diabetes    High levels of fetal hemoglobin interfere with the HbA1C  assay. Heterozygous hemoglobin variants (HbS, HgC, etc)do  not significantly interfere with this assay.   However, presence of multiple variants may affect accuracy.     03/09/2023 6.5 (H) 4.0 - 5.6 % Final     Comment:     ADA Screening Guidelines:  5.7-6.4%  Consistent with prediabetes  >or=6.5%  Consistent with diabetes    High levels of fetal hemoglobin interfere with the HbA1C  assay. Heterozygous hemoglobin variants (HbS, HgC, etc)do  not significantly interfere with this assay.   However, presence of multiple variants may affect accuracy.     07/08/2022 6.1 (H) 4.0 - 5.6 % Final     Comment:     ADA Screening Guidelines:  5.7-6.4%  Consistent with prediabetes  >or=6.5%  Consistent with diabetes    High levels of fetal hemoglobin interfere with the HbA1C  assay. Heterozygous hemoglobin variants (HbS, HgC, etc)do  not significantly interfere with this assay.   However, presence of multiple variants may affect accuracy.         GOAL A1C: 6.5% without hypoglycemia     DM MEDICATIONS USED IN THE PAST: Levemir   Novolog    Tresiba   Fiasp   Tandem TSLim   Metformin     CURRENT DIABETES MEDICATIONS: Metformin  mg with dinner     Insulin Pump: Tandem T-slim with Novolog  insulin     Pump settings: currently in Control IQ mode   Basal: adjusted basal based on average seen from Control IQ -   MN-3AM:   2 units/hr   3AM- 8AM: 1.5 units/hr   8AM- MN: 1.4 units/hr      ICR   MN-5PM:1:5  5PM-MN: 1:4      ISF:   MN- 3AM:1:30   3AM-8AM: 1:30   8AM- 5PM: 1:20  5PM- MN: 1:17      Target: 120 ( but target in control IQ is 110)     IOB: 3 hours ( but control IQ is 5 hours)          Pump site change: q 2-3 days   Cartridge change: q 2-3 days  Insulin TDD: 91.98  units/day     Basal 40%   Bolus 60%  Control IQ only 71%    Back up Lantus/Levemir: back up Tresiba at home       Patient's pump was downloaded in clinic today and reviewed with patient.   Please see attached documents for pump download.     Pump supplies come from: [employer]        BLOOD GLUCOSE MONITORING:    Sensor type: Dexcom G6   Average BG readin  Estimated A1c:  7.4%   Time in range:  66%  Site change:q10 days    Two weeks prior her average blood sugar was 168  and she was in target range 67% of the time with an estimated A1c of 7.3 %    Dexcom supplies from- [employer]    Sensor was downloaded in clinic today and reviewed with patient.   Please see attached document for download.         HYPOGLYCEMIA:  <1% low   0% very low   2 weeks prior-- <1% low   <1% very low   On Dexcom download less than 1% low and less than 0% very low  Has Baqsimi nasal spray   + possible hypoglycemia unawareness on the past before she started the Dexcom. Having bad night sweats that are waking her up from her sleep.       MEALS: eating 3 meals per day    She is CHO counting   She feels confident with CHO counting   Snack: crackers and cheese and baby bell cheese or almonds and other nuts   Drinks: water   No sugary beverages        CURRENT EXERCISE:  No      Review of Systems  Review of Systems    Constitutional:  Negative for appetite change, fatigue and unexpected weight change.   HENT:  Negative for trouble swallowing.    Eyes:  Negative for visual disturbance.   Respiratory:  Negative for shortness of breath.    Cardiovascular:  Negative for chest pain.   Gastrointestinal:  Negative for abdominal distention, abdominal pain, constipation and nausea.   Endocrine: Negative for polydipsia, polyphagia and polyuria.   Genitourinary:         No Nocturia    Skin:  Negative for wound.   Neurological:  Negative for numbness.         Physical Exam   Physical Exam  Vitals and nursing note reviewed.   Constitutional:       Appearance: She is well-developed. She is obese.   HENT:      Head: Normocephalic and atraumatic.      Right Ear: External ear normal.      Left Ear: External ear normal.      Nose: Nose normal.   Neck:      Thyroid: No thyromegaly.      Trachea: No tracheal deviation.   Cardiovascular:      Rate and Rhythm: Normal rate and regular rhythm.      Heart sounds: No murmur heard.  Pulmonary:      Effort: Pulmonary effort is normal. No respiratory distress.      Breath sounds: Normal breath sounds.   Abdominal:      Palpations: Abdomen is soft.      Tenderness: There is no abdominal tenderness.      Hernia: No hernia is present.   Musculoskeletal:      Cervical back: Normal range of motion and neck supple.   Skin:     General: Skin is warm and dry.      Capillary Refill: Capillary refill takes less than 2 seconds.      Findings: No rash.      Comments: Insulin pump sites and Dexcom sites without complication. No lipo hypertropthy or atrophy     Neurological:      Mental Status: She is alert and oriented to person, place, and time.      Cranial Nerves: No cranial nerve deficit.   Psychiatric:         Behavior: Behavior normal.         Judgment: Judgment normal.           FOOT EXAMINATION: Appropriate footwear           Lab Results   Component Value Date    TSH 2.170 07/08/2023           Type 1 diabetes  mellitus with hyperglycemia  Uncontrolled based on Dexcom download and pump download-- she is due to for labs   Attest to dietary indiscretions-- knows she needs to cut back   Frustrated with weight gain  Tried Ozempic at last visit but insurance denied because she is T1DM   Much better control with insulin pump therapy.   She would like to try the omnipod 5 AID       -- Medication Changes:      Adjust Metformin to 500 mg BID       Change pump to Omnipod 5   Use Fiasp insulin in Omnipod 5       Insulin pump settings: continue to remain in auto mode   Basal:  Continue current basal settings   MN-3AM:   2 units/hr  3AM- 8AM: 1.5 units/hr   8AM- MN: 1.4 units/hr     ICR   MN- 5 PM: 1:5  5PM- MN: 1:4--    ISF:   MN- 3AM:1:25- tightened    3AM-8AM: 1:25-- tightened   8AM- 5PM: 1:20  5PM- MN: 1:17     Target- 100-120     IOB: 3 hours       -- Reviewed goals of therapy are to get the best control we can without hypoglycemia.  -- Reviewed patient's current insulin regimen. Clarified proper insulin dose and timing in relation to meals, etc. Insulin injection sites and proper rotation instructed.    -- Advised frequent self blood glucose monitoring.  Patient encouraged to document glucose results and bring them to every clinic visit. Continue to use Dexcom g6   -- Hypoglycemia precautions discussed. Instructed on precautions before driving.    -- Call for Bg repeatedly < 70 or > 180.   -- Close adherence to lifestyle changes recommended.   -- Periodic follow ups for eye evaluations, foot care and dental care suggested.  -- see diabetes education-- Omnipod 5 start   See yifan 7-10 days later for omnipod 5 download and review       Patient has diabetes mellitus and manages diabetes with intensive insulin regimen and uses prandial and basal insulin daily-- via pump   Patient requires a therapeutic CGM and is willing to use therapeutic CGM for the necessary frequent adjustments of insulin therapy.  I have completed an in-person  visit during the previous 6 months and will continue to have in-person visits every 6 months to assess adherence to their CGM regimen and diabetes treatment plan.  Due to COVID pandemic and need for remote monitoring this tool is medically necessary        Type 1 diabetes mellitus with hypoglycemia and without coma  Reviewed hypoglycemia management: Treat with 1/2 glass of juice, 1/2 can regular coke, or 4 glucose tablets.   Monitor and repeat treatment every 15 minutes until BG is >70   Then have a snack, which includes a complex carbohydrate and protein.    - has Baqsimi     -- has improved with pump with control IQ    Reviewed proper treatment and what to do following hypoglycemia before a meal     Class 2 severe obesity due to excess calories with serious comorbidity and body mass index (BMI) of 36.0 to 36.9 in adult  Body mass index is 36.81 kg/m².  Increases insulin resistance.   Discussed DM diet and exercise.       Vitamin D deficiency  On OTC supplement   She stopped the ergo     Hypothyroidism due to Hashimoto's thyroiditis  TPO +   Confirms hypothyroidism     On levothyroxine 25 mcg daily and 1.5 tablets on Saturday and Wednesdays    reinforced take by itself on empty stomach, first thing in the morning and wait at least 30-60 minutes to eat, drink, or take other medications.    Check TSH     Insulin pump fitting or adjustment  See above     Insulin pump in place  See above     Dyslipidemia, goal LDL below 100  On statin per ADA recommendations  LDL goal < 100.  Continue statin.   Check lipids             Pump backup plan    If the insulin pump is non functional and discontinued for anticipated more than 20 hours, please give daily injections of:   Long acting insulin Tresiba 36 units daily   Short acting insulin NOvolog  for meals according to carb ratios and sensitivity factor in the pump. Insulin to carbohdrate ratio 1:5     When the insulin pump is restarted, do not restart basal rates until at least  22 hours after the last long acting insulin injection. You can set a 0% temporary basal setting that will last until this time and use your pump to bolus for meals and correction.     For any technical insulin pump issues, please contact the insulin pump company; the toll free number is printed on the label on the back of the insulin pump.       If your sugar is running high for a few hours and does not respond to two correction doses from the insulin pump, it may mean that you have a bad pump site and the site should be changed         No follow-ups on file.  Fasting labs when she can   Schedule with yifan for omnipod 5 start   See yifan 1 week later for download and view   See me in 3-4 months with labs prior -- will order after labs result         Orders Placed This Encounter   Procedures    Comprehensive Metabolic Panel     Standing Status:   Future     Standing Expiration Date:   9/25/2025    CBC Auto Differential     Standing Status:   Future     Standing Expiration Date:   9/25/2025    Hemoglobin A1C     Standing Status:   Future     Standing Expiration Date:   9/25/2025    Microalbumin/Creatinine Ratio, Urine     Standing Status:   Future     Standing Expiration Date:   9/25/2025     Order Specific Question:   Specimen Source     Answer:   Urine    TSH     Standing Status:   Future     Standing Expiration Date:   9/25/2025    Vitamin D     Standing Status:   Future     Standing Expiration Date:   9/25/2025    Lipid Panel     Standing Status:   Future     Standing Expiration Date:   5/24/2025    Ambulatory referral/consult to Diabetes Education     Standing Status:   Future     Standing Expiration Date:   9/25/2025     Referral Priority:   Routine     Referral Type:   Consultation     Referral Reason:   Specialty Services Required     Referred to Provider:   Yifan Chiu, GLORIA, CDE     Requested Specialty:   Diabetes     Number of Visits Requested:   1         Recommendations were discussed with the patient  in detail  The patient verbalized understanding and agrees with the plan outlined as above.     This note was partly generated with [employer] voice recognition software. I apologize for any possible typographical errors.

## 2024-03-26 RX ORDER — INSULIN PMP CART,AUT,G6/7,CNTR
1 EACH SUBCUTANEOUS
Qty: 3 EACH | Refills: 11 | Status: SHIPPED | OUTPATIENT
Start: 2024-03-26

## 2024-03-26 RX ORDER — INSULIN PMP CART,AUT,G6/7,CNTR
1 EACH SUBCUTANEOUS
Qty: 1 EACH | Refills: 0 | Status: SHIPPED | OUTPATIENT
Start: 2024-03-26

## 2024-03-27 ENCOUNTER — TELEPHONE (OUTPATIENT)
Dept: DIABETES | Facility: CLINIC | Age: 34
End: 2024-03-27
Payer: COMMERCIAL

## 2024-03-27 NOTE — TELEPHONE ENCOUNTER
Reached out to [facility] stated that they did receive new omnipod 5 rx stating that pt will change pods every 48 hours, updated within [facility] system

## 2024-03-31 DIAGNOSIS — E10.65 TYPE 1 DIABETES MELLITUS WITH HYPERGLYCEMIA: Primary | ICD-10-CM

## 2024-04-01 ENCOUNTER — PATIENT MESSAGE (OUTPATIENT)
Dept: DIABETES | Facility: CLINIC | Age: 34
End: 2024-04-01
Payer: COMMERCIAL

## 2024-04-01 DIAGNOSIS — E10.65 TYPE 1 DIABETES MELLITUS WITH HYPERGLYCEMIA: ICD-10-CM

## 2024-04-02 RX ORDER — INSULIN ASPART 100 [IU]/ML
INJECTION, SOLUTION INTRAVENOUS; SUBCUTANEOUS
Qty: 4 EACH | Refills: 6 | Status: SHIPPED | OUTPATIENT
Start: 2024-04-02 | End: 2024-05-27

## 2024-04-29 ENCOUNTER — NUTRITION (OUTPATIENT)
Dept: DIABETES | Facility: CLINIC | Age: 34
End: 2024-04-29
Payer: COMMERCIAL

## 2024-04-29 DIAGNOSIS — E10.65 TYPE 1 DIABETES MELLITUS WITH HYPERGLYCEMIA: ICD-10-CM

## 2024-04-29 PROCEDURE — G0108 DIAB MANAGE TRN  PER INDIV: HCPCS | Mod: S$GLB,,, | Performed by: DIETITIAN, REGISTERED

## 2024-04-29 PROCEDURE — 99999 PR PBB SHADOW E&M-EST. PATIENT-LVL II: CPT | Mod: PBBFAC,,, | Performed by: DIETITIAN, REGISTERED

## 2024-05-06 ENCOUNTER — CLINICAL SUPPORT (OUTPATIENT)
Dept: DIABETES | Facility: CLINIC | Age: 34
End: 2024-05-06
Payer: COMMERCIAL

## 2024-05-06 DIAGNOSIS — E10.649 TYPE 1 DIABETES MELLITUS WITH HYPOGLYCEMIA AND WITHOUT COMA: ICD-10-CM

## 2024-05-06 DIAGNOSIS — E10.65 TYPE 1 DIABETES MELLITUS WITH HYPERGLYCEMIA: Primary | ICD-10-CM

## 2024-05-06 PROCEDURE — 99999 PR PBB SHADOW E&M-EST. PATIENT-LVL I: CPT | Mod: PBBFAC,,, | Performed by: DIETITIAN, REGISTERED

## 2024-05-06 PROCEDURE — G0108 DIAB MANAGE TRN  PER INDIV: HCPCS | Mod: S$GLB,,, | Performed by: DIETITIAN, REGISTERED

## 2024-05-06 NOTE — PROGRESS NOTES
Diabetes Care Specialist Progress Note  Author: Aretha Chiu RD, CDE  Date: 5/6/2024    Program Intake  Reason for Diabetes Program Visit:: Initial Diabetes Assessment  Type of Intervention:: Individual  Individual: Device Training  Device Training: Insulin Pump Start  Current diabetes risk level:: low (HgbA1c 6.4%)  In the last 12 months, have you:: none  Permission to speak with others about care:: no  Continuous Glucose Monitoring  Patient has CGM: Yes  Personal CGM type:: Dexcom G6  GMI Date: 04/29/24  GMI Value: 7 %    Lab Results   Component Value Date    HGBA1C 6.4 (H) 03/30/2024       Clinical            There is no height or weight on file to calculate BMI.    Patient Health Rating  Compared to other people your age, how would you rate your health?: Excellent    Problem Review  Reviewed Problem List with Patient: yes  Active comorbidities affecting diabetes self-care.: no  Reviewed health maintenance: yes    Clinical Assessment  Current Diabetes Treatment: Oral Medication, Insulin pump, Insulin  Have you ever experienced hypoglycemia (low blood sugar)?: yes  In the last month, how often have you experienced low blood sugar?: once a week  Are you able to tell when your blood sugar is low?: Yes  What symptoms do you experience?: Dizzy/Light-headed, Shaky, Anxious/nervous  Have you ever been hospitalized because your blood sugar was too low?: no  How do you treat hypoglycemia (low blood sugar)?: 5-6 pieces of hard candy, 1/2 can soda/fruit juice  Have you ever experienced hyperglycemia (high blood sugar)?: yes  In the last month, how often have you experienced high blood sugar?: other (see comments) (less than once every 2 weeks)  Are you able to tell when your blood sugar is high?: No (comment)  Have you ever been hospitalized because your blood sugar was high?: yes (see comments) (upon diagnosis in 2021)    Medication Information  How do you obtain your medications?: Patient drives  How many days a week do  you miss your medications?: Never  Do you use a pill box or medication chart to help you manage your medications?: No  Do you sometimes have difficulty refilling your medications?: No  Medication adherence impacting ability to self-manage diabetes?: No    Labs  Do you have regular lab work to monitor your medications?: Yes  Type of Regular Lab Work: Cholesterol, Microalbumin, BMP, CBC  Where do you get your labs drawn?: [facility]  Lab Compliance Barriers: No    Nutritional Status  Diet: Diabetic diet  Meal Plan 24 Hour Recall: Breakfast, Lunch, Dinner, Snack  Meal Plan 24 Hour Recall - Breakfast: omlet or greek yogurt with strawberries  Meal Plan 24 Hour Recall - Lunch: salad with ranch/ greek yogurt-- turkey, cheese  Meal Plan 24 Hour Recall - Dinner: protein and vegetable, salad with ranch/ greek yogurt-- turkey, cheese or something like red beans and rice or cabbage with sausage  Meal Plan 24 Hour Recall - Snack: crackers and cheese and baby bell cheese or almonds and other nuts; Drinks: water  Change in appetite?: No  Dentation:: Intact  Recent Changes in Weight: No Recent Weight Change  Current nutritional status an area of need that is impacting patient's ability to self-manage diabetes?: No    Additional Social History    Support  Does anyone support you with your diabetes care?: yes  Who supports you?: spouse, self  Who takes you to your medical appointments?: self  Does the current support meet the patient's needs?: Yes  Is Support an area impacting ability to self-manage diabetes?: No    Access to Mass Media & Technology  Does the patient have access to any of the following devices or technologies?: Smart phone, Home computer, Internet Access  Media or technology needs impacting ability to self-manage diabetes?: No    Cognitive/Behavioral Health  Alert and Oriented: Yes  Difficulty Thinking: No  Requires Prompting: No  Requires assistance for routine expression?: No  Cognitive or behavioral barriers  impacting ability to self-manage diabetes?: No         Communication  Language preference: English  Hearing Problems: No  Vision Problems: No  Communication needs impacting ability to self-manage diabetes?: No    Health Literacy  Preferred Learning Method: Face to Face, Hands On, Demonstration  How often do you need to have someone help you read instructions, pamphlets, or written material from your doctor or pharmacy?: Never  Health literacy needs impacting ability to self-manage diabetes?: No      Diabetes Self-Management Skills Assessment    Diabetes Disease Process/Treatment Options  Patient/caregiver able to state what happens when someone has diabetes.: yes  Patient/caregiver knows what type of diabetes they have.: yes  Diabetes Type : Type I  Patient/caregiver able to identify at least three signs and symptoms of diabetes.: yes  Identified signs and symptoms:: blurred vision, frequent urination, frequent infections, increased thirst  Diabetes Disease Process/Treatment Options: Skills Assessment Completed: Yes  Assessment indicates:: Adequate understanding  Area of need?: No    Nutrition/Healthy Eating  Method of carbohydrate measurement:: carb counting/reading labels  Patient can identify foods that impact blood sugar.: yes  Patient-identified foods:: fruit/fruit juice, starches (bread, pasta, rice, cereal), milk, soda, starchy vegetables (corn, peas, beans), sweets, yogurt  Nutrition/Healthy Eating Skills Assessment Completed:: Yes  Assessment indicates:: Adequate understanding  Area of need?: No    Physical Activity/Exercise  Patient's daily activity level:: sedentary  Patient formally exercises outside of work.: no  Reasons for not exercising:: time constraints  Patient can identify forms of physical activity.: yes  Stated forms of physical activity:: moving to burn calories, recreational activities, swimming  Patient can identify reasons why exercise/physical activity is important in diabetes management.:  yes  Identified reasons:: lowers blood glucose, blood pressure, and cholesterol, tones muscles  Physical Activity/Exercise Skills Assessment Completed: : Yes  Assessment indicates:: Adequate understanding  Area of need?: No    Medications  Patient is able to describe current diabetes management routine.: yes  Diabetes management routine:: diet, insulin, insulin pump (Tandem switching to the omnipod 5)  Patient is able to identify current diabetes medications, dosages, and appropriate timing of medications.: yes  Patient understands the purpose of the medications taken for diabetes.: yes  Patient reports problems or concerns with current medication regimen.: yes  Medication regimen problems/concerns:: other (see comments) (switching systems)  Medication Skills Assessment Completed:: Yes  Assessment indicates:: Adequate understanding, Instruction Needed  Area of need?: Yes    Home Blood Glucose Monitoring  Patient states that blood sugar is checked at home daily.: yes  Monitoring Method:: personal continuous glucose monitor  Personal CGM type:: Dexcom G6  Patient is able to use personal CGM appropriately.: yes  CGM Report reviewed?: yes  Home Blood Glucose Monitoring Skills Assessment Completed: : Yes  Assessment indicates:: Adequate understanding  Area of need?: Yes    Acute Complications  Patient is able to identify types of acute complications: Yes  Patient Identified:: Hypoglycemia, Hyperglycemia, Diabetic Ketoacidosis  Patient is able to state the basic meaning of hypoglycemia?: Yes  Able to state the blood sugar range for hypoglycemia?: yes  Patient stated range:: <70  Patient can identify general symptoms of hypoglycemia: yes  Patient identified:: anxiety, dizziness, shakiness, sweating (sweating overnight)  Able to state proper treatment of hypoglycemia?: yes  Patient identified:: 1/2 can soda/fruit juice, 4 glucose tablets, 5-6 pieces of hard candy  Patient is able to state the basic meaning of  hyperglycemia?: Yes  Able to state the blood sugar range for hyperglycemia?: yes  Patient stated range:: >200  Patient able to state proper treatment of hyperglycemia?: yes  Patient identified:: contact provider, monitor blood sugar, take medication as recommended, increase water intake  Patient able to verbalize sick day plan?: yes  Patient-stated sick day plan:: call provider if blood sugar does not improve, check blood sugar every 2-3 hours, seek medical attention for nausea, diarrhea, vomiting, fever with high blood sugar, drink more fluids  Acute Complications Skills Assessment Completed: : Yes  Assessment indicates:: Adequate understanding  Area of need?: No    Chronic Complications  Chronic Complications Skills Assessment Completed: : No  Deferred due to:: Time  Area of need?: No    Psychosocial/Coping  Psychosocial/Coping Skills Assessment Completed: : No  Deffered due to:: Time  Area of need?: No      Assessment Summary and Plan    Based on today's diabetes care assessment, the following areas of need were identified:          4/29/2024    12:01 AM   Social   Support No   Access to Mass Media/Tech No   Cognitive/Behavioral Health No   Communication No   Health Literacy No            4/29/2024    12:01 AM   Clinical   Medication Adherence No   Lab Compliance No   Nutritional Status No            4/29/2024    12:01 AM   Diabetes Self-Management Skills   Diabetes Disease Process/Treatment Options No   Nutrition/Healthy Eating No   Physical Activity/Exercise No   Medication Yes, Insulin Pump Education  OMNI POD 5 INSULIN PUMP START  Explained SmartAdjust Technology - Every 5 minutes, the system automatically increases, decreases, or pauses insulin delivery based on your customized target glucose--helping to protect against highs and lows, day and night.  Automated Mode vs Manual Mode vs Limited Automated Mode  Downloading the Lucila and ProConnect ([facility])  Pod and Dexcom need to be in line of site of each  other  Inputting Dexcom Transmitter Code into Lucila or Handheld Device  Dexcom communicates with the pod directly and the Dexcom G6 lucila  Activity Feature  Changing the target and the length of time insulin is on board will adjust automated mode  Changing ICR/Basal Rates/ISF will only change setting in manual mode  SmartBolus Calculator - incorporates CGM data and trend data  Setup podder central account and linked [employer] account  Patient educated on insulin pump therapy, what a basal rate and bolus dose are, when to change pod, demonstrated how to prepare the syringe and pod for use with the pump, discussed ease of usage, basal and bolus, carbohydrate to insulin ratio, correction factors, approved areas to insert set, carbohydrate counting, error messages, explained the basal rates - patient will receive a little bit of insulin throughout 24 hours, bolus dose are delivered delivered by the inputting grams of carbohydrates, explained that patient will be counting carbohydrates and checking blood glucose before each meal, discussed when to change the pod, demonstrated how to fill the pod with insulin, how to apply pod and insert the needle, explained and demonstrated how to safely retract the needle and remove the pod, discussed ease of usage, carbohydrate counting, demonstrated applying device, inserting needle, administering bolus insulin, retracting needle, and removing/disposing of pod. Patient states understanding and performed return demonstration. Patient started first pod in office. Patient provided with written literature and CDE contact information      Pump training was provided per Omni Pod protocol.  Patient is new to insulin pump therapy.      All settings obtained from Natalia Zuniga's last office visit note.   Basal:  MN-3AM:   2 units/hr  3AM- 8AM: 1.5 units/hr   8AM- MN: 1.4 units/hr      ICR   MN- 5 PM: 1:5  5PM- MN: 1:4     ISF:   MN- 8AM: 1:25  8AM- 5PM: 1:20  5PM- MN: 1:17      Target- 100-120       IOB: 3 hours     Max basal rate: 5 u/hr     Glucose target : 120 mg/dL  Correct  over: 120mg/dl  Active insulin time: 3 hours  Max bolus: 30 units     Low reservoir insulin alarm: 10 units  Change pod alarm: every 3 days      Details of pump therapy were covered included following controller features and programming, pod activation, pod site selection and rotation, automatic pod priming and insertion, setting & editing basal rates in manual mode, giving bolus and other features in the set-up menu.  The following regarding the Omnipod 5 was covered:  During your first Pod wear, since no recent history is available, the Omnipod 5 System uses only your active Basal Program from Manual Mode as a starting point to adjust your insulin. After 48 hours of history is collected, which usually happens with the first Pod wear, SmartCaptricity technology stops adjusting insulin against your active Basal Program and starts using the Adaptive Basal Rate for your automated insulin delivery with your next Pod change. With each Pod change, insulin delivery information is sent and saved in the Omnipod 5 Lucila so that the next Pod that is started is updated with the new Adaptive Basal Rate. With each new Pod activation, the system adapts insulin delivery based on physiological needs and total daily insulin (TDI) delivered. After 2-3 Pod changes, adaptation generally stabilizes and automated insulin delivery is based on this adaptation.  Patient demonstrated ability to program controller, activate and insert pod using aseptic technique.  Patient demonstrated ability to program Dexcom transmitter into controller and start automated limited mode.    Instructed pt on use of basic pump features ie...give a bolus, pause insulin, switch from manual to automated mode.  Reviewed features available in manual mode verses automated mode.   Reviewed when and how to use activity function in automated mode.  Reviewed site selection of pods, rotation of  sites and hard stop to change pod every 72 hrs.   Instructed that insulin vial is good out of refrigeration for up to 28-30 days.   Reviewed treatment of hypoglycemia, hyperglycemia; sick day care, DKA, and troubleshooting of pump.  Advised to carry back-up supplies with her and discussed steps to take in case of connection loss.   Omni Pod 24-hour support line provided.       Home Blood Glucose Monitoring Yes, entering transmitter into omnipod 5     Acute Complications No   Chronic Complications No   Psychosocial/Coping No          Today's interventions were provided through individual discussion, instruction, and written materials were provided.      Patient verbalized understanding of instruction and written materials.  Pt was able to return back demonstration of instructions today. Patient understood key points, needs reinforcement and further instruction.     Diabetes Self-Management Care Plan:    Today's Diabetes Self-Management Care Plan was developed with Etta's input. Etta has agreed to work toward the following goal(s) to improve his/her overall diabetes control.      Care Plan: Diabetes Management   Updates made since 4/6/2024 12:00 AM        Problem: Medications         Goal: Patient Agrees to take Diabetes Medication(s) insulin via Tandem Insulin Pump as prescribed. Completed 4/29/2024   Start Date: 2/15/2021   Priority: High   Barriers: No Barriers Identified   Note:    Discussed and started Tandem T-Slim and Control IQ.   Patient educated on insulin pump therapy, the purpose of insulin pump therapy, advantages and disadvantages of insulin pump therapy and/vs multiple daily injections, what a basal rate and bolus dose are, when to change infusion set and reservoir , demonstrated how to prepare the reservoir and infusion set for use with the pump, discussed ease of usage, basal and bolus, carbohydrate to insulin ratio, correction factors, BG target 120, approved areas to insert set, carbohydrate  counting, error messages, explained the multiple basal rates are programed for tighter control - patient will receive a little bit of insulin throughout 24 hours, bolus dose are delivered delivered by the inputting grams of carbohydrates, explained that patient will be counting carbohydrates and blood glucose from Dexcom will auto populate in pump before each meal, discussed when to change the reservoir and tubing, demonstrated how to fill the reservoir with insulin, how to apply infusion set. Educated on putting Dexcom G6 sensor information into the t-slim. Patient states understanding. Explained control IQ, exercise and sleep settings. Patient starting in Control IQ. Patient will follow-up in 1 week. Patient provided with written literature and CDE contact information          Goal: Patient will successfully use the omnipod 5 system to dose meal time insulin for the next 4 weeks    Start Date: 4/29/2024   Expected End Date: 5/27/2024   This Visit's Progress: On track   Priority: High   Barriers: Knowledge deficit          Follow Up Plan     Follow up in about 1 week (around 5/6/2024) for Insulin Pump Upload, Personal CGM Upload.    Today's care plan and follow up schedule was discussed with patient.  Etta verbalized understanding of the care plan, goals, and agrees to follow up plan.        The patient was encouraged to communicate with his/her health care provider/physician and care team regarding his/her condition(s) and treatment.  I provided the patient with my contact information today and encouraged to contact me via phone or [facility] Patient Portal as needed.     Length of Visit   Total Time: 120 Minutes

## 2024-05-08 ENCOUNTER — PATIENT MESSAGE (OUTPATIENT)
Dept: DIABETES | Facility: CLINIC | Age: 34
End: 2024-05-08

## 2024-05-08 ENCOUNTER — PATIENT OUTREACH (OUTPATIENT)
Dept: DIABETES | Facility: CLINIC | Age: 34
End: 2024-05-08
Payer: COMMERCIAL

## 2024-05-08 DIAGNOSIS — Z46.81 INSULIN PUMP FITTING OR ADJUSTMENT: ICD-10-CM

## 2024-05-08 DIAGNOSIS — Z96.41 INSULIN PUMP IN PLACE: ICD-10-CM

## 2024-05-08 DIAGNOSIS — E10.65 TYPE 1 DIABETES MELLITUS WITH HYPERGLYCEMIA: Primary | ICD-10-CM

## 2024-05-08 DIAGNOSIS — E10.649 TYPE 1 DIABETES MELLITUS WITH HYPOGLYCEMIA AND WITHOUT COMA: ICD-10-CM

## 2024-05-08 PROCEDURE — 95251 CONT GLUC MNTR ANALYSIS I&R: CPT | Mod: S$GLB,,, | Performed by: NURSE PRACTITIONER

## 2024-05-08 NOTE — Clinical Note
Please let patient know that I reviewed her Omnipod download from [employer].  I will send her a portal message on some changes I want to make to her correction factor also known as the sensitivity factor If she does not feel comfortable making the changes herself she can come in and I can make the changes for her

## 2024-05-08 NOTE — PROGRESS NOTES
Continuous Glucose Sensor Report of Personal Device    Etta Bill is a 34 y.o.female with type 1 diabetes     Interpretation of data from Dexcom G---4/30/2024-5/6/2024    Reason for review: Currently on anti-hyperglycemic therapy and failing to achieve glycemic goals.    Lab Results   Component Value Date    HGBA1C 6.4 (H) 03/30/2024         Current diabetes medications and doses:     Omnipod 5   See doses below   ________________________________________________________________    SUMMARY of KEY FINDINGS:      Average glucose: 158  Above 180 mg/dL: 16%  (Above 250 mg/dL: 10%)  Within  mg/dL: 73%  Below 70 mg/dL: 1%  (Below 54 mg/dL: 0%)        CGM data reviewed and notable for the following trends: having some prandial excursions   Mostly after lunch     Also having some hypoglycemia after corrections.       Will make the following changes based on review of data:   Continue Omnipod 5   Cut back on ISF   MN- 8AM:-1:30  8AM- 5PM- 1:25  5Pm- MN: 1:25        Natalia Zuniga NP

## 2024-05-14 NOTE — PROGRESS NOTES
Diabetes Care Specialist Follow-up Note  Author: Aretha Chiu RD, CDE  Date: 5/14/2024    Program Intake  Reason for Diabetes Program Visit:: Intervention  Type of Intervention:: Individual  Individual: Device Training  Device Training: Insulin Pump Start  Current diabetes risk level:: low (HgbA1c 6.4%)  In the last 12 months, have you:: none  Permission to speak with others about care:: no  Continuous Glucose Monitoring  Patient has CGM: Yes  Personal CGM type:: Dexcom G6  GMI Date: 04/29/24  GMI Value: 7 %    Lab Results   Component Value Date    HGBA1C 6.4 (H) 03/30/2024     A1c Pre Diabetes Care Specialist Intervention:  6.4%    Clinical    Patient Health Rating  Compared to other people your age, how would you rate your health?: Excellent    Problem Review  Reviewed Problem List with Patient: yes  Active comorbidities affecting diabetes self-care.: no  Reviewed health maintenance: yes    Clinical Assessment  Current Diabetes Treatment: Oral Medication, Diet, Insulin pump, Insulin  Have you ever experienced hypoglycemia (low blood sugar)?: yes  In the last month, how often have you experienced low blood sugar?: once a week  Are you able to tell when your blood sugar is low?: Yes  What symptoms do you experience?: Dizzy/Light-headed, Shaky, Anxious/nervous  Have you ever been hospitalized because your blood sugar was too low?: no  How do you treat hypoglycemia (low blood sugar)?: 5-6 pieces of hard candy, 1/2 can soda/fruit juice  Have you ever experienced hyperglycemia (high blood sugar)?: yes  In the last month, how often have you experienced high blood sugar?: other (see comments)  Are you able to tell when your blood sugar is high?: No (comment)  Have you ever been hospitalized because your blood sugar was high?: yes (see comments)    Medication Information  Medication adherence impacting ability to self-manage diabetes?: No    Labs  Lab Compliance Barriers: No    Nutritional Status  Diet: Diabetic  diet  Meal Plan 24 Hour Recall: Breakfast, Lunch, Dinner, Snack  Meal Plan 24 Hour Recall - Breakfast: greek yogurt with strawberries  Meal Plan 24 Hour Recall - Lunch: salad with ranch/ greek yogurt-- turkey, cheese  Meal Plan 24 Hour Recall - Dinner: protein and vegetable, salad with ranch/ greek yogurt-- turkey, cheese or something like red beans and rice or cabbage with sausage  Meal Plan 24 Hour Recall - Snack: crackers and cheese and baby bell cheese or almonds and other nuts; Drinks: water  Change in appetite?: No  Dentation:: Intact  Recent Changes in Weight: No Recent Weight Change  Current nutritional status an area of need that is impacting patient's ability to self-manage diabetes?: No    Physical activity/Exercise:   No change    SMBG: using the dexcom G6                    Additional Social History    Support  Is Support an area impacting ability to self-manage diabetes?: No    Access to Mass Media & Technology  Media or technology needs impacting ability to self-manage diabetes?: No    Cognitive/Behavioral Health  Cognitive or behavioral barriers impacting ability to self-manage diabetes?: No    Culture/Mandaeism  Culture or Congregational beliefs that may impact ability to access healthcare: No    Communication  Communication needs impacting ability to self-manage diabetes?: No    Health Literacy  Health literacy needs impacting ability to self-manage diabetes?: No      Diabetes Self-Management Skills Assessment     Diabetes Disease Process/Treatment Options  Diabetes Disease Process/Treatment Options: Skills Assessment Completed: No  Assessment indicates:: Adequate understanding  Deferred due to:: Other (comment) (previously completed, there has been no change and patient has adequate understanding)  Area of need?: No    Nutrition/Healthy Eating  Nutrition/Healthy Eating Skills Assessment Completed:: No  Assessment indicates:: Adequate understanding  Deffered due to:: Other (comment) (previously  completed, there has been no change and patient has adequate understanding)  Area of need?: No    Physical Activity/Exercise  Physical Activity/Exercise Skills Assessment Completed: : No  Assessment indicates:: Adequate understanding  Deffered due to:: Other (comment) (previously completed, there has been no change and patient has adequate understanding)  Area of need?: No    Medications  Patient is able to describe current diabetes management routine.: yes  Diabetes management routine:: diet, insulin, insulin pump  Patient is able to identify current diabetes medications, dosages, and appropriate timing of medications.: yes  Patient understands the purpose of the medications taken for diabetes.: yes  Patient reports problems or concerns with current medication regimen.: no  Medication Skills Assessment Completed:: Yes  Assessment indicates:: Adequate understanding  Area of need?: No    Home Blood Glucose Monitoring  Personal CGM type:: Dexcom G6  Home Blood Glucose Monitoring Skills Assessment Completed: : No  Assessment indicates:: Adequate understanding  Deferred due to:: Other (comment) (previously completed, there has been no change and patient has adequate understanding)  Area of need?: No    Acute Complications  Acute Complications Skills Assessment Completed: : No  Assessment indicates:: Adequate understanding  Deffered due to:: Other (comment) (previously completed, there has been no change and patient has adequate understanding)  Area of need?: No    Chronic Complications  Patient can identify major chronic complications of diabetes.: yes  Stated chronic complications:: heart disease/heart attack, stroke, kidney disease, neuropathy/nerve damage, retinopathy, sexual dysfunction  Patient can identify ways to prevent or delay diabetes complications.: yes  Stated ways to prevent complications:: avoiding smoking and other types of tobacco, checking feet daily and having routine comprehensive foot exams,  controlling blood sugar, controlling cholesterol and triglycerides, having regular diabetic eye exams, healthy eating and regular activity, maintaining optimal blood glucose control  Patient is aware that having diabetes increases risk of heart disease?: Yes  Patient is aware that heart disease is the leading cause of death and disability in people with diabetes?: Yes  Patient able to state risk factors for heart disease?: Yes  Patient stated risk factors for heart disease:: High blood pressure, High cholesterol, Diet, Medication non-adherance, Having diabetes  Patient is taking statin?: Yes  Chronic Complications Skills Assessment Completed: : Yes  Assessment indicates:: Adequate understanding  Area of need?: No    Psychosocial/Coping  Patient can identify ways of coping with chronic disease.: yes  Patient-stated ways of coping with chronic disease:: support from loved ones  Psychosocial/Coping Skills Assessment Completed: : Yes  Assessment indicates:: Adequate understanding  Area of need?: No      During today's follow-up visit,  the following areas required further assessment and content was provided/reviewed.    Based on today's diabetes care assessment, the following areas of need were identified:          5/6/2024    12:01 AM   Social   Support No   Access to Mass Media/Tech No   Cognitive/Behavioral Health No   Culture/Buddhism No   Communication No   Health Literacy No            5/6/2024    12:01 AM   Clinical   Medication Adherence No   Lab Compliance No   Nutritional Status No            5/6/2024    12:01 AM   Diabetes Self-Management Skills   Diabetes Disease Process/Treatment Options No   Nutrition/Healthy Eating No   Physical Activity/Exercise No   Medication No, patient loves the omnipod 5, grateful to have switched from the t-slim and not having to worry about messing with the tubing   Home Blood Glucose Monitoring No, Comparison of previous CGM data 4/29/2024 with T-Slim to current with Omnipod  5    Average Glucose 157 increased slightly from 156  Standard Deviation 61 decreased from 63  GMI NA % from 7.0 %    Time in Range  9% of time patient was in Very High Range no change from 9%  15% of time patient was in High Range improved from 20%  74% of time patient was in Range improved from 68%  1% of time patient was in Low Range improved from 2%  <1% of time patient was in Very Low Range no change from <1%    Some slight improvement seen in the past week of being on the omnipod 5   Acute Complications No   Chronic Complications No   Psychosocial/Coping No        Today's interventions were provided through individual discussion, instruction, and written materials were provided.    Patient verbalized understanding of instruction and written materials.  Pt was able to return back demonstration of instructions today. Patient understood key points, needs reinforcement and further instruction.     Diabetes Self-Management Care Plan Review and Evaluation of Progress:    During today's follow-up Aayush Diabetes Self-Management Care Plan progress was reviewed and progress was evaluated including his/her input. Etta has agreed to continue his/her journey to improve/maintain overall diabetes control by continuing to set health goals. See care plan progress below.      Care Plan: Diabetes Management   Updates made since 4/14/2024 12:00 AM        Problem: Medications         Goal: Patient Agrees to take Diabetes Medication(s) insulin via Tandem Insulin Pump as prescribed. Completed 4/29/2024   Start Date: 2/15/2021   Priority: High   Barriers: No Barriers Identified   Note:    Discussed and started Tandem T-Slim and Control IQ.   Patient educated on insulin pump therapy, the purpose of insulin pump therapy, advantages and disadvantages of insulin pump therapy and/vs multiple daily injections, what a basal rate and bolus dose are, when to change infusion set and reservoir , demonstrated how to prepare the reservoir  and infusion set for use with the pump, discussed ease of usage, basal and bolus, carbohydrate to insulin ratio, correction factors, BG target 120, approved areas to insert set, carbohydrate counting, error messages, explained the multiple basal rates are programed for tighter control - patient will receive a little bit of insulin throughout 24 hours, bolus dose are delivered delivered by the inputting grams of carbohydrates, explained that patient will be counting carbohydrates and blood glucose from Dexcom will auto populate in pump before each meal, discussed when to change the reservoir and tubing, demonstrated how to fill the reservoir with insulin, how to apply infusion set. Educated on putting Dexcom G6 sensor information into the t-slim. Patient states understanding. Explained control IQ, exercise and sleep settings. Patient starting in Control IQ. Patient will follow-up in 1 week. Patient provided with written literature and CDE contact information          Goal: Patient will successfully use the omnipod 5 system to dose meal time insulin for the next 4 weeks    Start Date: 4/29/2024   Expected End Date: 5/27/2024   This Visit's Progress: On track   Recent Progress: On track   Priority: High   Barriers: No Barriers Identified          Follow Up Plan     Follow up in about 4 weeks (around 6/3/2024) for Insulin Pump Upload, Personal CGM Upload.    Today's care plan and follow up schedule was discussed with patient.  Etta verbalized understanding of the care plan, goals, and agrees to follow up plan.        The patient was encouraged to communicate with his/her health care provider/physician and care team regarding his/her condition(s) and treatment.  I provided the patient with my contact information today and encouraged to contact me via phone or [facility]'s Patient Portal as needed.     Length of Visit   Total Time: 30 Minutes

## 2024-05-27 DIAGNOSIS — E10.65 TYPE 1 DIABETES MELLITUS WITH HYPERGLYCEMIA: ICD-10-CM

## 2024-05-27 RX ORDER — INSULIN ASPART 100 [IU]/ML
INJECTION, SOLUTION INTRAVENOUS; SUBCUTANEOUS
Qty: 40 ML | Refills: 11 | Status: SHIPPED | OUTPATIENT
Start: 2024-05-27

## 2024-05-30 ENCOUNTER — TELEPHONE (OUTPATIENT)
Dept: DIABETES | Facility: CLINIC | Age: 34
End: 2024-05-30
Payer: COMMERCIAL

## 2024-05-30 NOTE — TELEPHONE ENCOUNTER
Spoke to rep over at [facility], stated they faxed us documents, stated we have not received documents, provided rep with correct fax number , stated we should receive documents within 15 minutes

## 2024-06-14 ENCOUNTER — TELEPHONE (OUTPATIENT)
Dept: DIABETES | Facility: CLINIC | Age: 34
End: 2024-06-14
Payer: COMMERCIAL

## 2024-06-21 ENCOUNTER — TELEPHONE (OUTPATIENT)
Dept: DIABETES | Facility: CLINIC | Age: 34
End: 2024-06-21
Payer: COMMERCIAL

## 2024-07-09 ENCOUNTER — PATIENT MESSAGE (OUTPATIENT)
Dept: DIABETES | Facility: CLINIC | Age: 34
End: 2024-07-09
Payer: COMMERCIAL

## 2024-07-09 ENCOUNTER — TELEPHONE (OUTPATIENT)
Dept: DIABETES | Facility: CLINIC | Age: 34
End: 2024-07-09
Payer: COMMERCIAL

## 2024-07-10 ENCOUNTER — TELEPHONE (OUTPATIENT)
Dept: DIABETES | Facility: CLINIC | Age: 34
End: 2024-07-10

## 2024-07-10 ENCOUNTER — OFFICE VISIT (OUTPATIENT)
Dept: DIABETES | Facility: CLINIC | Age: 34
End: 2024-07-10
Payer: COMMERCIAL

## 2024-07-10 VITALS — HEIGHT: 61 IN | WEIGHT: 185 LBS | BODY MASS INDEX: 34.93 KG/M2

## 2024-07-10 DIAGNOSIS — E10.649 TYPE 1 DIABETES MELLITUS WITH HYPOGLYCEMIA AND WITHOUT COMA: ICD-10-CM

## 2024-07-10 DIAGNOSIS — E78.5 DYSLIPIDEMIA, GOAL LDL BELOW 100: ICD-10-CM

## 2024-07-10 DIAGNOSIS — T75.3XXD MOTION SICKNESS, SUBSEQUENT ENCOUNTER: ICD-10-CM

## 2024-07-10 DIAGNOSIS — Z71.9 HEALTH EDUCATION/COUNSELING: ICD-10-CM

## 2024-07-10 DIAGNOSIS — E55.9 VITAMIN D DEFICIENCY: ICD-10-CM

## 2024-07-10 DIAGNOSIS — E03.8 HYPOTHYROIDISM DUE TO HASHIMOTO'S THYROIDITIS: ICD-10-CM

## 2024-07-10 DIAGNOSIS — E06.3 HYPOTHYROIDISM DUE TO HASHIMOTO'S THYROIDITIS: ICD-10-CM

## 2024-07-10 DIAGNOSIS — E66.09 CLASS 1 OBESITY DUE TO EXCESS CALORIES WITH SERIOUS COMORBIDITY AND BODY MASS INDEX (BMI) OF 34.0 TO 34.9 IN ADULT: ICD-10-CM

## 2024-07-10 DIAGNOSIS — Z96.41 INSULIN PUMP IN PLACE: ICD-10-CM

## 2024-07-10 DIAGNOSIS — E10.65 TYPE 1 DIABETES MELLITUS WITH HYPERGLYCEMIA: Primary | ICD-10-CM

## 2024-07-10 PROBLEM — E66.811 CLASS 1 OBESITY DUE TO EXCESS CALORIES WITH SERIOUS COMORBIDITY AND BODY MASS INDEX (BMI) OF 34.0 TO 34.9 IN ADULT: Status: ACTIVE | Noted: 2020-10-27

## 2024-07-10 RX ORDER — ATORVASTATIN CALCIUM 10 MG/1
10 TABLET, FILM COATED ORAL NIGHTLY
Qty: 90 TABLET | Refills: 3 | Status: SHIPPED | OUTPATIENT
Start: 2024-07-10

## 2024-07-10 RX ORDER — INSULIN ASPART INJECTION 100 [IU]/ML
INJECTION, SOLUTION SUBCUTANEOUS
Qty: 40 ML | Refills: 11 | Status: SHIPPED | OUTPATIENT
Start: 2024-07-10

## 2024-07-10 RX ORDER — METFORMIN HYDROCHLORIDE 500 MG/1
500 TABLET, EXTENDED RELEASE ORAL 2 TIMES DAILY WITH MEALS
Qty: 180 TABLET | Refills: 2 | Status: SHIPPED | OUTPATIENT
Start: 2024-07-10

## 2024-07-10 RX ORDER — SCOLOPAMINE TRANSDERMAL SYSTEM 1 MG/1
1 PATCH, EXTENDED RELEASE TRANSDERMAL
Qty: 3 PATCH | Refills: 0 | Status: SHIPPED | OUTPATIENT
Start: 2024-07-10

## 2024-07-10 RX ORDER — ERGOCALCIFEROL 1.25 MG/1
50000 CAPSULE ORAL
COMMUNITY
End: 2024-07-10 | Stop reason: SDUPTHER

## 2024-07-10 RX ORDER — ERGOCALCIFEROL 1.25 MG/1
50000 CAPSULE ORAL
Qty: 12 CAPSULE | Refills: 3 | Status: SHIPPED | OUTPATIENT
Start: 2024-07-10

## 2024-07-10 RX ORDER — INSULIN PMP CART,AUT,G6/7,CNTR
1 EACH SUBCUTANEOUS
Qty: 3 EACH | Refills: 11 | Status: SHIPPED | OUTPATIENT
Start: 2024-07-10

## 2024-07-10 NOTE — PROGRESS NOTES
The patient location is: work- [place]   The chief complaint leading to consultation is: Diabetes management - follow up     Visit type: audiovisual    Face to Face time with patient: 23 minutes   30  minutes of total time spent on the encounter, which includes face to face time and non-face to face time preparing to see the patient (eg, review of tests), Obtaining and/or reviewing separately obtained history, Documenting clinical information in the electronic or other health record, Independently interpreting results (not separately reported) and communicating results to the patient/family/caregiver, or Care coordination (not separately reported).         Each patient to whom he or she provides medical services by telemedicine is:  (1) informed of the relationship between the physician and patient and the respective role of any other health care provider with respect to management of the patient; and (2) notified that he or she may decline to receive medical services by telemedicine and may withdraw from such care at any time.    Notes:         CC:   Chief Complaint   Patient presents with    Diabetes Mellitus       HPI: Etta Bill is a 34 y.o. female presents for a follow up visit today for the management of T1DM.      She was diagnosed with Type 1 diabetes in September 2020 when she went into DKA and was hospitalized. She presented with fatigue, polyuria, polydipsia, nausea and vomiting. BG was 330 on chemistry panel. She was discharged home on insulin therapy ( [facility]).   9/30/2020--LYDIA and anti islet cell antibodies--both positive.  C-peptide low at 0.53 with a fasting glucose of 243--confirms the diagnosis of type 1 diabetes      Family hx of diabetes: grandmother   Hospitalized for diabetes: DKA at diagnosis.     No personal or FH of thyroid cancer or personal of pancreatic cancer or pancreatitis.       She started the insulin pump ( Tandem  T-Slim) on 2/15/2021.  Started Omnipod 5 in April 2024   She still  feels confident with her carbohydrate counting      Our last visit was in March 2024   Following that visit she met with diabetes education and started the Omnipod 5 at the end of April 2024   At last visit we adjusted the Metformin to 500 mg BID   Changed her insulin to Fiasp with the Omnipod 5   Tightened her correction factor   Continued the dexcom G6   A1c is 6.3% - fructosamine level higher than A1c and Fructosamine level correlates to dexcom downloads   See attached download   She never changed to Fiasp -- still taking the Novolog   Only been taking the metformin once a day   Having issues with staying in auto mode on the omnipod 5. Reports that the pump is kicking her out of auto mode   Post prandial excursions following dinner noted on download       Hypothyroidism due to Hashimoto's-   Started on levothyroxine 50 mcg daily after + antibodies an elevated TSH . Her TSH level was low so we cut back on her LT4 to 50 mcg daily except 1/2 tablet 2 days per week. - however she was taking it incorrectly   Now on LT4 25 mcg daily -- 1 whole tablet- except 1.5 tablets on Saturdays and Wednesdays ---      Vitamin-D  ergo 2 times per week -- was takign OTC supplement then went back to erg0                         DIABETES COMPLICATIONS: none      Diabetes Management Status    ASA:  No    Statin: taking Lipitor 10 mg nightly   ACE/ARB: Not taking     Screening or Prevention Patient's value Goal Complete/Controlled?   HgA1C Testing and Control   Lab Results   Component Value Date    HGBA1C 6.3 (H) 07/03/2024      Annually/Less than 8% No   Lipid profile : 03/30/2024 Annually Yes   LDL control Lab Results   Component Value Date    LDLCALC 94.2 03/30/2024    Annually/Less than 100 mg/dl  No   Nephropathy screening Lab Results   Component Value Date    LABMICR <5.0 03/30/2024     Lab Results   Component Value Date    PROTEINUA Negative 09/12/2020    Annually Yes   Blood pressure BP Readings from Last 1 Encounters:    03/25/24 119/61    Less than 140/90 Yes   Dilated retinal exam : 12/10/2020 Annually No   Foot exam   : 11/16/2022 Annually No       CURRENT A1C:    Hemoglobin A1C   Date Value Ref Range Status   07/03/2024 6.3 (H) 4.0 - 5.6 % Final     Comment:     ADA Screening Guidelines:  5.7-6.4%  Consistent with prediabetes  >or=6.5%  Consistent with diabetes    High levels of fetal hemoglobin interfere with the HbA1C  assay. Heterozygous hemoglobin variants (HbS, HgC, etc)do  not significantly interfere with this assay.   However, presence of multiple variants may affect accuracy.     03/30/2024 6.4 (H) 4.0 - 5.6 % Final     Comment:     ADA Screening Guidelines:  5.7-6.4%  Consistent with prediabetes  >or=6.5%  Consistent with diabetes    High levels of fetal hemoglobin interfere with the HbA1C  assay. Heterozygous hemoglobin variants (HbS, HgC, etc)do  not significantly interfere with this assay.   However, presence of multiple variants may affect accuracy.     07/08/2023 6.2 (H) 4.0 - 5.6 % Final     Comment:     ADA Screening Guidelines:  5.7-6.4%  Consistent with prediabetes  >or=6.5%  Consistent with diabetes    High levels of fetal hemoglobin interfere with the HbA1C  assay. Heterozygous hemoglobin variants (HbS, HgC, etc)do  not significantly interfere with this assay.   However, presence of multiple variants may affect accuracy.         GOAL A1C: 6.5% without hypoglycemia         DM MEDICATIONS USED IN THE PAST: Levemir   Novolog   Tresiba   Fiasp   Tandem TSLim   Metformin       CURRENT DIABETES MEDICATIONS: Metformin  mg with dinner     Insulin Pump: Omnipod 5 with Novolog  insulin     Basal:  Continue current basal settings   MN-3AM:   2 units/hr  3AM- 8AM: 1.5 units/hr   8AM- MN: 1.4 units/hr      ICR   MN- 5 PM: 1:5  5PM- MN: 1:4--     ISF:   MN- 3AM:1:25- tightened    3AM-8AM: 1:25-- tightened   8AM- 5PM: 1:20  5PM- MN: 1:17      Target- 100-120      IOB: 3 hours           Pump site change: q 2-3 days    Cartridge change: q 2-3 days  Insulin TDD: 64.7  units/day     Basal 51 %   Bolus 49%      Back up Lantus/Levemir: back up Tresiba at home       Patient's pump was downloaded in clinic today and reviewed with patient.   Please see attached documents for pump download.     Pump supplies come from: [employer]          BLOOD GLUCOSE MONITORING:    Sensor type: Dexcom G6   Average BG readin  Estimated A1c:  7.3%  Time in range:  64%  Site change:q10 days      Dexcom supplies from- [employer]    Sensor was downloaded in clinic today and reviewed with patient.   Please see attached document for download.         HYPOGLYCEMIA: 3% low    0%very low   + possible hypoglycemia unawareness on the past before she started the Dexcom.       MEALS: eating 3 meals per day    She is CHO counting   She feels confident with CHO counting   Snack: crackers and cheese and baby bell cheese or almonds and other nuts   Drinks: water   No sugary beverages          CURRENT EXERCISE:  No        Review of Systems  Review of Systems   Constitutional:  Negative for appetite change, fatigue and unexpected weight change.   HENT:  Negative for trouble swallowing.    Eyes:  Negative for visual disturbance.   Respiratory:  Negative for shortness of breath.    Cardiovascular:  Negative for chest pain.   Gastrointestinal:  Negative for abdominal distention, abdominal pain, constipation and nausea.   Endocrine: Negative for polydipsia, polyphagia and polyuria.   Genitourinary:         No Nocturia    Skin:  Negative for wound.   Neurological:  Negative for numbness.         Physical Exam   Physical Exam  Vitals and nursing note reviewed.   Constitutional:       General: She is not in acute distress.     Appearance: She is obese. She is not ill-appearing.   HENT:      Head: Normocephalic.   Pulmonary:      Effort: Pulmonary effort is normal.   Neurological:      General: No focal deficit present.      Mental Status: She is alert and oriented to person,  place, and time.   Psychiatric:         Mood and Affect: Mood normal.         Behavior: Behavior normal.         Thought Content: Thought content normal.         Judgment: Judgment normal.           FOOT EXAMINATION: Deferred due to virtual visit         Lab Results   Component Value Date    TSH 3.436 03/30/2024           Type 1 diabetes mellitus with hyperglycemia  Uncontrolled based on Dexcom download and pump download--Fructosamine level correlates to dexcom/pump download   Having post prandial excursions following dinner that last into the night / early AM   Needs to work on staying in auto mode- on download only in auto mode 80% -- want 100%   Let us know if having issues with getting kicked out of auto mode- may need to see education / call omnipod for help   She likes the omnipod much better   Tried Ozempic in the past but insurance denied because she is T1DM -- she deferred retrying at this visit         -- Medication Changes:      Adjust Metformin to 500 mg BID -- discussed this today again -- can take both at night together or take 1 tablet BID       Continue Omnipod 5   Recommend changing the insulin to Fiasp-- sent insulin to [facility] [place]       Insulin pump settings: stay in auto mode 100%! Discussed troubleshooting the pump   Hit the use sensor button more often to correct the hyperglycemia     Basal:  Continue current basal settings   MN-3AM:   2 units/hr  3AM- 8AM: 1.5 units/hr   8AM- MN: 1.4 units/hr     ICR   MN- 5 PM: 1:5  5PM- MN: 1:4--    ISF:   MN- 3AM:1:25-  3AM-8AM: 1:25--   8AM- 5PM: 1:20  5PM- MN: 1:17     Target- 100-120     IOB: 3 hours       -- Reviewed goals of therapy are to get the best control we can without hypoglycemia.  -- Reviewed patient's current insulin regimen. Clarified proper insulin dose and timing in relation to meals, etc. Insulin injection sites and proper rotation instructed.    -- Advised frequent self blood glucose monitoring.  Patient encouraged to  document glucose results and bring them to every clinic visit. Continue to use Dexcom g6   -- Hypoglycemia precautions discussed. Instructed on precautions before driving.    -- Call for Bg repeatedly < 70 or > 180.   -- Close adherence to lifestyle changes recommended.   -- Periodic follow ups for eye evaluations, foot care and dental care suggested.  -- follow up with diabetes education as needed     Patient has diabetes mellitus and manages diabetes with intensive insulin regimen and uses prandial and basal insulin daily-- via pump   Patient requires a therapeutic CGM and is willing to use therapeutic CGM for the necessary frequent adjustments of insulin therapy.  I have completed an in-person visit during the previous 6 months and will continue to have in-person visits every 6 months to assess adherence to their CGM regimen and diabetes treatment plan.  Due to COVID pandemic and need for remote monitoring this tool is medically necessary        Type 1 diabetes mellitus with hypoglycemia and without coma  Reviewed hypoglycemia management: Treat with 1/2 glass of juice, 1/2 can regular coke, or 4 glucose tablets.   Monitor and repeat treatment every 15 minutes until BG is >70   Then have a snack, which includes a complex carbohydrate and protein.    - has Baqsimi     -better when in auto mode     Reviewed proper treatment and what to do following hypoglycemia before a meal     Class 1 obesity due to excess calories with serious comorbidity and body mass index (BMI) of 34.0 to 34.9 in adult  Body mass index is 34.96 kg/m².  Increases insulin resistance.   Discussed DM diet and exercise.       Vitamin D deficiency  Back on ergo   Try taking it once a week     Hypothyroidism due to Hashimoto's thyroiditis  TPO +   Confirms hypothyroidism     On levothyroxine 25 mcg daily and 1.5 tablets on Saturday and Wednesdays    reinforced take by itself on empty stomach, first thing in the morning and wait at least 30-60 minutes  to eat, drink, or take other medications.    Last TSH WNL     Insulin pump in place  See above     Dyslipidemia, goal LDL below 100  On statin per ADA recommendations  LDL goal < 100. LDL at goal. LFTs WNL. Continue statin.           Pump backup plan    If the insulin pump is non functional and discontinued for anticipated more than 20 hours, please give daily injections of:   Long acting insulin Tresiba 36 units daily   Short acting insulin NOvolog  for meals according to carb ratios and sensitivity factor in the pump. Insulin to carbohdrate ratio 1:5     When the insulin pump is restarted, do not restart basal rates until at least 22 hours after the last long acting insulin injection. You can set a 0% temporary basal setting that will last until this time and use your pump to bolus for meals and correction.     For any technical insulin pump issues, please contact the insulin pump company; the toll free number is printed on the label on the back of the insulin pump.       If your sugar is running high for a few hours and does not respond to two correction doses from the insulin pump, it may mean that you have a bad pump site and the site should be changed         Follow up in about 4 months (around 11/10/2024).  Labs prior to follow up       Orders Placed This Encounter   Procedures    Hemoglobin A1C     Standing Status:   Future     Standing Expiration Date:   1/10/2026    Fructosamine     Standing Status:   Future     Standing Expiration Date:   1/10/2026    Basic Metabolic Panel     Standing Status:   Future     Standing Expiration Date:   1/10/2026    Vitamin D     Standing Status:   Future     Standing Expiration Date:   1/10/2026    TSH     Standing Status:   Future     Standing Expiration Date:   1/10/2026         Recommendations were discussed with the patient in detail  The patient verbalized understanding and agrees with the plan outlined as above.     This note was partly generated with [employer] voice  recognition software. I apologize for any possible typographical errors.

## 2024-07-10 NOTE — ASSESSMENT & PLAN NOTE
TPO +   Confirms hypothyroidism     On levothyroxine 25 mcg daily and 1.5 tablets on Saturday and Wednesdays    reinforced take by itself on empty stomach, first thing in the morning and wait at least 30-60 minutes to eat, drink, or take other medications.    Last TSH WNL

## 2024-07-10 NOTE — ASSESSMENT & PLAN NOTE
Uncontrolled based on Dexcom download and pump download--Fructosamine level correlates to dexcom/pump download   Having post prandial excursions following dinner that last into the night / early AM   Needs to work on staying in auto mode- on download only in auto mode 80% -- want 100%   Let us know if having issues with getting kicked out of auto mode- may need to see education / call omnipod for help   She likes the omnipod much better   Tried Ozempic in the past but insurance denied because she is T1DM -- she deferred retrying at this visit         -- Medication Changes:      Adjust Metformin to 500 mg BID -- discussed this today again -- can take both at night together or take 1 tablet BID       Continue Omnipod 5   Recommend changing the insulin to Fiasp-- sent insulin to [facility] [place]       Insulin pump settings: stay in auto mode 100%! Discussed troubleshooting the pump   Hit the use sensor button more often to correct the hyperglycemia     Basal:  Continue current basal settings   MN-3AM:   2 units/hr  3AM- 8AM: 1.5 units/hr   8AM- MN: 1.4 units/hr     ICR   MN- 5 PM: 1:5  5PM- MN: 1:4--    ISF:   MN- 3AM:1:25-  3AM-8AM: 1:25--   8AM- 5PM: 1:20  5PM- MN: 1:17     Target- 100-120     IOB: 3 hours       -- Reviewed goals of therapy are to get the best control we can without hypoglycemia.  -- Reviewed patient's current insulin regimen. Clarified proper insulin dose and timing in relation to meals, etc. Insulin injection sites and proper rotation instructed.    -- Advised frequent self blood glucose monitoring.  Patient encouraged to document glucose results and bring them to every clinic visit. Continue to use Dexcom g6   -- Hypoglycemia precautions discussed. Instructed on precautions before driving.    -- Call for Bg repeatedly < 70 or > 180.   -- Close adherence to lifestyle changes recommended.   -- Periodic follow ups for eye evaluations, foot care and dental care suggested.  -- follow up  with diabetes education as needed     Patient has diabetes mellitus and manages diabetes with intensive insulin regimen and uses prandial and basal insulin daily-- via pump   Patient requires a therapeutic CGM and is willing to use therapeutic CGM for the necessary frequent adjustments of insulin therapy.  I have completed an in-person visit during the previous 6 months and will continue to have in-person visits every 6 months to assess adherence to their CGM regimen and diabetes treatment plan.  Due to COVID pandemic and need for remote monitoring this tool is medically necessary

## 2024-07-10 NOTE — ASSESSMENT & PLAN NOTE
Reviewed hypoglycemia management: Treat with 1/2 glass of juice, 1/2 can regular coke, or 4 glucose tablets.   Monitor and repeat treatment every 15 minutes until BG is >70   Then have a snack, which includes a complex carbohydrate and protein.    - has Baqsimi     -better when in auto mode     Reviewed proper treatment and what to do following hypoglycemia before a meal

## 2024-08-28 ENCOUNTER — PATIENT MESSAGE (OUTPATIENT)
Dept: DIABETES | Facility: CLINIC | Age: 34
End: 2024-08-28
Payer: COMMERCIAL

## 2024-08-29 ENCOUNTER — PATIENT MESSAGE (OUTPATIENT)
Dept: DIABETES | Facility: CLINIC | Age: 34
End: 2024-08-29
Payer: COMMERCIAL

## 2024-08-29 ENCOUNTER — TELEPHONE (OUTPATIENT)
Dept: DIABETES | Facility: CLINIC | Age: 34
End: 2024-08-29
Payer: COMMERCIAL

## 2024-09-25 ENCOUNTER — PATIENT MESSAGE (OUTPATIENT)
Dept: DIABETES | Facility: CLINIC | Age: 34
End: 2024-09-25
Payer: COMMERCIAL

## 2024-10-15 ENCOUNTER — PATIENT MESSAGE (OUTPATIENT)
Dept: DIABETES | Facility: CLINIC | Age: 34
End: 2024-10-15
Payer: COMMERCIAL

## 2024-10-28 ENCOUNTER — PATIENT MESSAGE (OUTPATIENT)
Dept: PRIMARY CARE CLINIC | Facility: CLINIC | Age: 34
End: 2024-10-28
Payer: COMMERCIAL

## 2024-11-11 ENCOUNTER — OFFICE VISIT (OUTPATIENT)
Dept: DIABETES | Facility: CLINIC | Age: 34
End: 2024-11-11
Payer: COMMERCIAL

## 2024-11-11 VITALS — WEIGHT: 184.94 LBS | BODY MASS INDEX: 34.95 KG/M2

## 2024-11-11 DIAGNOSIS — E66.09 CLASS 1 OBESITY DUE TO EXCESS CALORIES WITH SERIOUS COMORBIDITY AND BODY MASS INDEX (BMI) OF 34.0 TO 34.9 IN ADULT: ICD-10-CM

## 2024-11-11 DIAGNOSIS — E10.649 TYPE 1 DIABETES MELLITUS WITH HYPOGLYCEMIA AND WITHOUT COMA: ICD-10-CM

## 2024-11-11 DIAGNOSIS — Z71.9 HEALTH EDUCATION/COUNSELING: ICD-10-CM

## 2024-11-11 DIAGNOSIS — E06.3 HYPOTHYROIDISM DUE TO HASHIMOTO'S THYROIDITIS: ICD-10-CM

## 2024-11-11 DIAGNOSIS — E66.811 CLASS 1 OBESITY DUE TO EXCESS CALORIES WITH SERIOUS COMORBIDITY AND BODY MASS INDEX (BMI) OF 34.0 TO 34.9 IN ADULT: ICD-10-CM

## 2024-11-11 DIAGNOSIS — Z96.41 INSULIN PUMP IN PLACE: ICD-10-CM

## 2024-11-11 DIAGNOSIS — E10.65 TYPE 1 DIABETES MELLITUS WITH HYPERGLYCEMIA: Primary | ICD-10-CM

## 2024-11-11 DIAGNOSIS — E78.5 DYSLIPIDEMIA, GOAL LDL BELOW 100: ICD-10-CM

## 2024-11-11 DIAGNOSIS — E55.9 VITAMIN D DEFICIENCY: ICD-10-CM

## 2024-11-11 PROCEDURE — 3008F BODY MASS INDEX DOCD: CPT | Mod: CPTII,95,, | Performed by: NURSE PRACTITIONER

## 2024-11-11 PROCEDURE — 3066F NEPHROPATHY DOC TX: CPT | Mod: CPTII,95,, | Performed by: NURSE PRACTITIONER

## 2024-11-11 PROCEDURE — 99214 OFFICE O/P EST MOD 30 MIN: CPT | Mod: 95,,, | Performed by: NURSE PRACTITIONER

## 2024-11-11 PROCEDURE — 1159F MED LIST DOCD IN RCRD: CPT | Mod: CPTII,95,, | Performed by: NURSE PRACTITIONER

## 2024-11-11 PROCEDURE — 95251 CONT GLUC MNTR ANALYSIS I&R: CPT | Mod: NDTC,,, | Performed by: NURSE PRACTITIONER

## 2024-11-11 PROCEDURE — 1160F RVW MEDS BY RX/DR IN RCRD: CPT | Mod: CPTII,95,, | Performed by: NURSE PRACTITIONER

## 2024-11-11 PROCEDURE — 3044F HG A1C LEVEL LT 7.0%: CPT | Mod: CPTII,95,, | Performed by: NURSE PRACTITIONER

## 2024-11-11 PROCEDURE — 3061F NEG MICROALBUMINURIA REV: CPT | Mod: CPTII,95,, | Performed by: NURSE PRACTITIONER

## 2024-11-11 RX ORDER — LEVOTHYROXINE SODIUM 25 UG/1
25 TABLET ORAL
Qty: 102 TABLET | Refills: 2 | Status: SHIPPED | OUTPATIENT
Start: 2024-11-11

## 2024-11-11 RX ORDER — ATORVASTATIN CALCIUM 10 MG/1
10 TABLET, FILM COATED ORAL NIGHTLY
Qty: 90 TABLET | Refills: 3 | Status: SHIPPED | OUTPATIENT
Start: 2024-11-11

## 2024-11-11 RX ORDER — ERGOCALCIFEROL 1.25 MG/1
50000 CAPSULE ORAL
Qty: 12 CAPSULE | Refills: 3 | Status: SHIPPED | OUTPATIENT
Start: 2024-11-11

## 2024-11-11 RX ORDER — INSULIN PMP CART,AUT,G6/7,CNTR
1 EACH SUBCUTANEOUS
Qty: 3 EACH | Refills: 11 | Status: SHIPPED | OUTPATIENT
Start: 2024-11-11 | End: 2024-11-13 | Stop reason: SDUPTHER

## 2024-11-11 RX ORDER — METFORMIN HYDROCHLORIDE 500 MG/1
500 TABLET, EXTENDED RELEASE ORAL 2 TIMES DAILY WITH MEALS
Qty: 180 TABLET | Refills: 2 | Status: SHIPPED | OUTPATIENT
Start: 2024-11-11

## 2024-11-11 NOTE — ASSESSMENT & PLAN NOTE
On Ergo     Vit D, 25-Hydroxy   Date Value Ref Range Status   11/02/2024 39 30 - 96 ng/mL Final     Comment:     Vitamin D deficiency.........<10 ng/mL                              Vitamin D insufficiency......10-29 ng/mL       Vitamin D sufficiency........> or equal to 30 ng/mL  Vitamin D toxicity............>100 ng/mL

## 2024-11-11 NOTE — ASSESSMENT & PLAN NOTE
Uncontrolled based on Dexcom download and pump download--Fructosamine level correlates to dexcom/pump download   Having post prandial excursions following dinner that last into the night / early AM  still-- she believes this is from something that she is eating  She is doing a good job of staying in auto mode 100% of the time  We reviewed pump optimization of hitting the use sensor button throughout the day when her numbers are high    Tried Ozempic in the past but insurance denied because she is T1DM -- she deferred retrying at this visit         -- Medication Changes:      Continue Metformin 500 mg BID --      Continue Omnipod 5   With Fiasp insulin     Insulin pump settings: stay in auto mode 100%! Discussed troubleshooting the pump   Hit the use sensor button more often to correct the hyperglycemia     Basal:  Continue current basal settings   MN-3AM:   2 units/hr  3AM- 8AM: 1.5 units/hr   8AM- MN: 1.4 units/hr     ICR   MN- 5 PM: 1:5  5PM- MN: 1:4--    ISF:   MN- 3AM:1:25-  3AM-8AM: 1:25--   8AM- 5PM: 1:20  5PM- MN: 1:17     Target- 100-120     IOB: 3 hours     We discussed the upgrade options and today she would like to move forward with the dasha on her phone  We discussed that she can download the Omnipod 5 dasha on her phone  She will need to transfer over the settings  I did discuss if she does not feel comfortable doing the transfer she can let us know and she could come here and we can but the settings in her phone for her  She deferred upgrading to the G7 at this time as she would prefer the dasha on her phone      -- Reviewed goals of therapy are to get the best control we can without hypoglycemia.  -- Reviewed patient's current insulin regimen. Clarified proper insulin dose and timing in relation to meals, etc. Insulin injection sites and proper rotation instructed.    -- Advised frequent self blood glucose monitoring.  Patient encouraged to document glucose results and bring them to every clinic visit.  Continue to use Dexcom g6   -- Hypoglycemia precautions discussed. Instructed on precautions before driving.    -- Call for Bg repeatedly < 70 or > 180.   -- Close adherence to lifestyle changes recommended.   -- Periodic follow ups for eye evaluations, foot care and dental care suggested.  -- follow up with diabetes education as needed       Patient has diabetes mellitus and manages diabetes with intensive insulin regimen and uses prandial and basal insulin daily-- via pump   Patient requires a therapeutic CGM and is willing to use therapeutic CGM for the necessary frequent adjustments of insulin therapy.  I have completed an in-person visit during the previous 6 months and will continue to have in-person visits every 6 months to assess adherence to their CGM regimen and diabetes treatment plan.  Due to COVID pandemic and need for remote monitoring this tool is medically necessary

## 2024-11-11 NOTE — ASSESSMENT & PLAN NOTE
Body mass index is 34.95 kg/m².  Increases insulin resistance.   Discussed DM diet and exercise.

## 2024-11-11 NOTE — ASSESSMENT & PLAN NOTE
TPO +   Confirms hypothyroidism     On levothyroxine 25 mcg daily and 1.5 tablets on Saturday and Wednesdays    reinforced take by itself on empty stomach, first thing in the morning and wait at least 30-60 minutes to eat, drink, or take other medications.    TSH WNL -- at goal

## 2024-11-11 NOTE — PROGRESS NOTES
The patient location is: work- [place]   The chief complaint leading to consultation is: Diabetes management - follow up     Visit type: audiovisual    Face to Face time with patient: 18 minutes   30  minutes of total time spent on the encounter, which includes face to face time and non-face to face time preparing to see the patient (eg, review of tests), Obtaining and/or reviewing separately obtained history, Documenting clinical information in the electronic or other health record, Independently interpreting results (not separately reported) and communicating results to the patient/family/caregiver, or Care coordination (not separately reported).         Each patient to whom he or she provides medical services by telemedicine is:  (1) informed of the relationship between the physician and patient and the respective role of any other health care provider with respect to management of the patient; and (2) notified that he or she may decline to receive medical services by telemedicine and may withdraw from such care at any time.    Notes:         CC:   Chief Complaint   Patient presents with    Diabetes Mellitus       HPI: Etta Bill is a 34 y.o. female presents for a follow up visit today for the management of T1DM.      She was diagnosed with Type 1 diabetes in September 2020 when she went into DKA and was hospitalized. She presented with fatigue, polyuria, polydipsia, nausea and vomiting. BG was 330 on chemistry panel. She was discharged home on insulin therapy ( [facility]).   9/30/2020--LYDIA and anti islet cell antibodies--both positive.  C-peptide low at 0.53 with a fasting glucose of 243--confirms the diagnosis of type 1 diabetes      Family hx of diabetes: grandmother   Hospitalized for diabetes: DKA at diagnosis.     No personal or FH of thyroid cancer or personal of pancreatic cancer or pancreatitis.       She started the insulin pump ( Tandem  T-Slim) on 2/15/2021.  Started Omnipod 5 in April 2024   She still  feels confident with her carbohydrate counting        Our last visit was a virtual visit in July of 2024  At that visit we adjusted her metformin to 500 mg twice a day---we did discuss that she could take both at night together or do 1 tablet twice a day  Continue the Omnipod 5-  Recommended changing the insulin to Fiasp   Discussed that she needs to stay in auto mode 100% of the time  Reviewed pump troubleshooting  Hit the sensor button more often to correct hyperglycemia  We did continue her pump settings  We continued the Dexcom G6  See attached downloads  Recent A1c of 6.3%  Her fructosamine level does read higher--308--this is a 2 week average that correlates between a 7 and 8%  Still loves the omnipod 5 better than the Tandem       Hypothyroidism due to Hashimoto's-   Started on levothyroxine 50 mcg daily after + antibodies an elevated TSH . Her TSH level was low so we cut back on her LT4 to 50 mcg daily except 1/2 tablet 2 days per week. - however she was taking it incorrectly   Now on LT4 25 mcg daily -- 1 whole tablet- except 1.5 tablets on Saturdays and Wednesdays ---      Vitamin-D  on ergo                                           DIABETES COMPLICATIONS: none      Diabetes Management Status    ASA:  No    Statin: taking Lipitor 10 mg nightly   ACE/ARB: Not taking     Screening or Prevention Patient's value Goal Complete/Controlled?   HgA1C Testing and Control   Lab Results   Component Value Date    HGBA1C 6.3 (H) 11/02/2024      Annually/Less than 8% No   Lipid profile : 03/30/2024 Annually Yes   LDL control Lab Results   Component Value Date    LDLCALC 94.2 03/30/2024    Annually/Less than 100 mg/dl  No   Nephropathy screening Lab Results   Component Value Date    LABMICR <5.0 03/30/2024     Lab Results   Component Value Date    PROTEINUA Negative 09/12/2020    Annually Yes   Blood pressure BP Readings from Last 1 Encounters:   03/25/24 119/61    Less than 140/90 Yes   Dilated retinal exam : 12/10/2020  Annually No   Foot exam   : 11/16/2022 Annually No       CURRENT A1C:    Hemoglobin A1C   Date Value Ref Range Status   11/02/2024 6.3 (H) 4.0 - 5.6 % Final     Comment:     ADA Screening Guidelines:  5.7-6.4%  Consistent with prediabetes  >or=6.5%  Consistent with diabetes    High levels of fetal hemoglobin interfere with the HbA1C  assay. Heterozygous hemoglobin variants (HbS, HgC, etc)do  not significantly interfere with this assay.   However, presence of multiple variants may affect accuracy.     07/03/2024 6.3 (H) 4.0 - 5.6 % Final     Comment:     ADA Screening Guidelines:  5.7-6.4%  Consistent with prediabetes  >or=6.5%  Consistent with diabetes    High levels of fetal hemoglobin interfere with the HbA1C  assay. Heterozygous hemoglobin variants (HbS, HgC, etc)do  not significantly interfere with this assay.   However, presence of multiple variants may affect accuracy.     03/30/2024 6.4 (H) 4.0 - 5.6 % Final     Comment:     ADA Screening Guidelines:  5.7-6.4%  Consistent with prediabetes  >or=6.5%  Consistent with diabetes    High levels of fetal hemoglobin interfere with the HbA1C  assay. Heterozygous hemoglobin variants (HbS, HgC, etc)do  not significantly interfere with this assay.   However, presence of multiple variants may affect accuracy.         GOAL A1C: 6.5% without hypoglycemia         DM MEDICATIONS USED IN THE PAST: Levemir   Novolog   Tresiba   Fiasp   Tandem TSLim   Metformin   Omnipod 5   Fiasp         CURRENT DIABETES MEDICATIONS: Metformin  mg BID     Insulin Pump: Omnipod 5 with Fiasp insulin     Basal:  Continue current basal settings   MN-3AM:   2 units/hr  3AM- 8AM: 1.5 units/hr   8AM- MN: 1.4 units/hr      ICR   MN- 5 PM: 1:5  5PM- MN: 1:4--     ISF:   MN- 3AM:1:25-  3AM-8AM: 1:25--   8AM- 5PM: 1:20  5PM- MN: 1:17      Target- 100-120      IOB: 3 hours           Pump site change: q 2-3 days   Cartridge change: q 2-3 days  Insulin TDD: 74.1  units/day     Basal 52 %   Bolus  48%  100% in auto mode   2.5 boluses per day       Back up Lantus/Levemir: back up Tresiba at home       Patient's pump was downloaded in clinic today and reviewed with patient.   Please see attached documents for pump download.     Pump supplies come from: pharmacy         BLOOD GLUCOSE MONITORING:    Sensor type: Dexcom G6   Average BG readin  Estimated A1c:  7.1%  Time in range:  72%  Site change:q10 days    Two weeks prior average blood sugar 161 in in target range 72% of the time with an estimated A1c of 7.2%      Dexcom supplies from- [employer]    Sensor was downloaded in clinic today and reviewed with patient.   Please see attached document for download.         HYPOGLYCEMIA: 3% low    0%very low   + possible hypoglycemia unawareness on the past before she started the Dexcom.       MEALS: eating 3 meals per day    She is CHO counting   She feels confident with CHO counting   Snack: crackers and cheese and baby bell cheese or almonds and other nuts   Drinks: water   No sugary beverages          CURRENT EXERCISE:  No        Review of Systems  Review of Systems   Constitutional:  Negative for appetite change, fatigue and unexpected weight change.   HENT:  Negative for trouble swallowing.    Eyes:  Negative for visual disturbance.   Respiratory:  Negative for shortness of breath.    Cardiovascular:  Negative for chest pain.   Gastrointestinal:  Negative for abdominal distention, abdominal pain, constipation and nausea.   Endocrine: Negative for polydipsia, polyphagia and polyuria.   Genitourinary:         No Nocturia    Skin:  Negative for wound.   Neurological:  Negative for numbness.         Physical Exam   Physical Exam  Vitals and nursing note reviewed.   Constitutional:       General: She is not in acute distress.     Appearance: She is obese. She is not ill-appearing.   HENT:      Head: Normocephalic.   Pulmonary:      Effort: Pulmonary effort is normal.   Neurological:      General: No focal deficit  present.      Mental Status: She is alert and oriented to person, place, and time.   Psychiatric:         Mood and Affect: Mood normal.         Behavior: Behavior normal.         Thought Content: Thought content normal.         Judgment: Judgment normal.           FOOT EXAMINATION: Deferred due to virtual visit         Lab Results   Component Value Date    TSH 1.827 11/02/2024           Type 1 diabetes mellitus with hyperglycemia  Uncontrolled based on Dexcom download and pump download--Fructosamine level correlates to dexcom/pump download   Having post prandial excursions following dinner that last into the night / early AM  still-- she believes this is from something that she is eating  She is doing a good job of staying in auto mode 100% of the time  We reviewed pump optimization of hitting the use sensor button throughout the day when her numbers are high    Tried Ozempic in the past but insurance denied because she is T1DM -- she deferred retrying at this visit         -- Medication Changes:      Continue Metformin 500 mg BID --      Continue Omnipod 5   With Fiasp insulin     Insulin pump settings: stay in auto mode 100%! Discussed troubleshooting the pump   Hit the use sensor button more often to correct the hyperglycemia     Basal:  Continue current basal settings   MN-3AM:   2 units/hr  3AM- 8AM: 1.5 units/hr   8AM- MN: 1.4 units/hr     ICR   MN- 5 PM: 1:5  5PM- MN: 1:4--    ISF:   MN- 3AM:1:25-  3AM-8AM: 1:25--   8AM- 5PM: 1:20  5PM- MN: 1:17     Target- 100-120     IOB: 3 hours     We discussed the upgrade options and today she would like to move forward with the dasha on her phone  We discussed that she can download the Omnipod 5 dasha on her phone  She will need to transfer over the settings  I did discuss if she does not feel comfortable doing the transfer she can let us know and she could come here and we can but the settings in her phone for her  She deferred upgrading to the G7 at this time as she would  prefer the dasha on her phone      -- Reviewed goals of therapy are to get the best control we can without hypoglycemia.  -- Reviewed patient's current insulin regimen. Clarified proper insulin dose and timing in relation to meals, etc. Insulin injection sites and proper rotation instructed.    -- Advised frequent self blood glucose monitoring.  Patient encouraged to document glucose results and bring them to every clinic visit. Continue to use Dexcom g6   -- Hypoglycemia precautions discussed. Instructed on precautions before driving.    -- Call for Bg repeatedly < 70 or > 180.   -- Close adherence to lifestyle changes recommended.   -- Periodic follow ups for eye evaluations, foot care and dental care suggested.  -- follow up with diabetes education as needed       Patient has diabetes mellitus and manages diabetes with intensive insulin regimen and uses prandial and basal insulin daily-- via pump   Patient requires a therapeutic CGM and is willing to use therapeutic CGM for the necessary frequent adjustments of insulin therapy.  I have completed an in-person visit during the previous 6 months and will continue to have in-person visits every 6 months to assess adherence to their CGM regimen and diabetes treatment plan.  Due to COVID pandemic and need for remote monitoring this tool is medically necessary        Type 1 diabetes mellitus with hypoglycemia and without coma  Reviewed hypoglycemia management: Treat with 1/2 glass of juice, 1/2 can regular coke, or 4 glucose tablets.   Monitor and repeat treatment every 15 minutes until BG is >70   Then have a snack, which includes a complex carbohydrate and protein.    - has Baqsimi     -better when in auto mode     Reviewed proper treatment and what to do following hypoglycemia before a meal     Class 1 obesity due to excess calories with serious comorbidity and body mass index (BMI) of 34.0 to 34.9 in adult  Body mass index is 34.95 kg/m².  Increases insulin  resistance.   Discussed DM diet and exercise.       Vitamin D deficiency  On Ergo     Vit D, 25-Hydroxy   Date Value Ref Range Status   11/02/2024 39 30 - 96 ng/mL Final     Comment:     Vitamin D deficiency.........<10 ng/mL                              Vitamin D insufficiency......10-29 ng/mL       Vitamin D sufficiency........> or equal to 30 ng/mL  Vitamin D toxicity............>100 ng/mL             Hypothyroidism due to Hashimoto's thyroiditis  TPO +   Confirms hypothyroidism     On levothyroxine 25 mcg daily and 1.5 tablets on Saturday and Wednesdays    reinforced take by itself on empty stomach, first thing in the morning and wait at least 30-60 minutes to eat, drink, or take other medications.    TSH WNL -- at goal     Insulin pump in place  See above     Dyslipidemia, goal LDL below 100  On statin per ADA recommendations  LDL goal < 100. LDL at goal. LFTs WNL. Continue statin.           Pump backup plan    If the insulin pump is non functional and discontinued for anticipated more than 20 hours, please give daily injections of:   Long acting insulin Tresiba 36 units daily   Short acting insulin NOvolog  for meals according to carb ratios and sensitivity factor in the pump. Insulin to carbohdrate ratio 1:5     When the insulin pump is restarted, do not restart basal rates until at least 22 hours after the last long acting insulin injection. You can set a 0% temporary basal setting that will last until this time and use your pump to bolus for meals and correction.     For any technical insulin pump issues, please contact the insulin pump company; the toll free number is printed on the label on the back of the insulin pump.       If your sugar is running high for a few hours and does not respond to two correction doses from the insulin pump, it may mean that you have a bad pump site and the site should be changed         Follow up in about 6 months (around 5/11/2025).  Labs prior to follow up       Orders  Placed This Encounter   Procedures    Hemoglobin A1C     Standing Status:   Future     Standing Expiration Date:   5/11/2026    Comprehensive Metabolic Panel     Standing Status:   Future     Standing Expiration Date:   5/11/2026    CBC Auto Differential     Standing Status:   Future     Standing Expiration Date:   5/11/2026    Lipid Panel     Standing Status:   Future     Standing Expiration Date:   5/11/2026    Microalbumin/Creatinine Ratio, Urine     Standing Status:   Future     Standing Expiration Date:   5/11/2026     Order Specific Question:   Specimen Source     Answer:   Urine    TSH     Standing Status:   Future     Standing Expiration Date:   5/11/2026    Fructosamine     Standing Status:   Future     Standing Expiration Date:   5/11/2026         Recommendations were discussed with the patient in detail  The patient verbalized understanding and agrees with the plan outlined as above.     This note was partly generated with [employer] voice recognition software. I apologize for any possible typographical errors.

## 2024-11-12 ENCOUNTER — PATIENT MESSAGE (OUTPATIENT)
Dept: DIABETES | Facility: CLINIC | Age: 34
End: 2024-11-12
Payer: COMMERCIAL

## 2024-11-12 DIAGNOSIS — E10.65 TYPE 1 DIABETES MELLITUS WITH HYPERGLYCEMIA: ICD-10-CM

## 2024-11-13 RX ORDER — INSULIN PMP CART,AUT,G6/7,CNTR
1 EACH SUBCUTANEOUS
Qty: 3 EACH | Refills: 11 | Status: SHIPPED | OUTPATIENT
Start: 2024-11-13

## 2024-11-13 RX ORDER — INSULIN PMP CART,AUT,G6/7,CNTR
1 EACH SUBCUTANEOUS
Qty: 3 EACH | Refills: 11 | Status: SHIPPED | OUTPATIENT
Start: 2024-11-13 | End: 2024-11-13 | Stop reason: SDUPTHER

## 2024-12-26 ENCOUNTER — TELEPHONE (OUTPATIENT)
Dept: DIABETES | Facility: CLINIC | Age: 34
End: 2024-12-26
Payer: COMMERCIAL

## 2025-01-02 ENCOUNTER — TELEPHONE (OUTPATIENT)
Dept: DIABETES | Facility: CLINIC | Age: 35
End: 2025-01-02
Payer: COMMERCIAL

## 2025-01-02 DIAGNOSIS — E10.65 TYPE 1 DIABETES MELLITUS WITH HYPERGLYCEMIA: ICD-10-CM

## 2025-01-02 RX ORDER — INSULIN PMP CART,AUT,G6/7,CNTR
1 EACH SUBCUTANEOUS
Qty: 3 EACH | Refills: 11 | Status: SHIPPED | OUTPATIENT
Start: 2025-01-02

## 2025-01-02 NOTE — TELEPHONE ENCOUNTER
----- Message from Nurse Durant sent at 12/31/2024 11:48 AM CST -----  June Aguiar Staff  Caller: [facility] [phone] (Today, 10:59 AM)  Pharmacy is calling to clarify an RX.    RX name:  insulin pump cart,auto,BT,G6/7 (OMNIPOD 5 G6-G7 PODS, GEN 5,) Crtg    What do they need to clarify:  for changing every 72 hours quantity would need to be 10 for 30 day supply or 30 for 90 day supply. Please re sent to the pharmacy with corrected quantity. Thanks    Comments:      They are saying that they now have an fax rx for pt to change out every 72 hours, so which should it be ? Every 48 hours or 72 hours ?

## 2025-05-05 ENCOUNTER — RESULTS FOLLOW-UP (OUTPATIENT)
Dept: DIABETES | Facility: CLINIC | Age: 35
End: 2025-05-05

## 2025-05-12 ENCOUNTER — OFFICE VISIT (OUTPATIENT)
Dept: DIABETES | Facility: CLINIC | Age: 35
End: 2025-05-12
Payer: COMMERCIAL

## 2025-05-12 VITALS
SYSTOLIC BLOOD PRESSURE: 104 MMHG | OXYGEN SATURATION: 97 % | WEIGHT: 192 LBS | DIASTOLIC BLOOD PRESSURE: 71 MMHG | HEART RATE: 82 BPM | HEIGHT: 61 IN | BODY MASS INDEX: 36.25 KG/M2

## 2025-05-12 DIAGNOSIS — Z71.9 HEALTH EDUCATION/COUNSELING: ICD-10-CM

## 2025-05-12 DIAGNOSIS — E10.649 TYPE 1 DIABETES MELLITUS WITH HYPOGLYCEMIA UNAWARENESS: ICD-10-CM

## 2025-05-12 DIAGNOSIS — E66.01 CLASS 2 SEVERE OBESITY DUE TO EXCESS CALORIES WITH SERIOUS COMORBIDITY AND BODY MASS INDEX (BMI) OF 36.0 TO 36.9 IN ADULT: ICD-10-CM

## 2025-05-12 DIAGNOSIS — T75.3XXD MOTION SICKNESS, SUBSEQUENT ENCOUNTER: ICD-10-CM

## 2025-05-12 DIAGNOSIS — E78.5 DYSLIPIDEMIA, GOAL LDL BELOW 100: ICD-10-CM

## 2025-05-12 DIAGNOSIS — E10.649 TYPE 1 DIABETES MELLITUS WITH HYPOGLYCEMIA AND WITHOUT COMA: ICD-10-CM

## 2025-05-12 DIAGNOSIS — E06.3 HYPOTHYROIDISM DUE TO HASHIMOTO'S THYROIDITIS: ICD-10-CM

## 2025-05-12 DIAGNOSIS — E55.9 VITAMIN D DEFICIENCY: ICD-10-CM

## 2025-05-12 DIAGNOSIS — E10.65 UNCONTROLLED TYPE 1 DIABETES MELLITUS WITH HYPERGLYCEMIA: ICD-10-CM

## 2025-05-12 DIAGNOSIS — Z96.41 INSULIN PUMP IN PLACE: ICD-10-CM

## 2025-05-12 DIAGNOSIS — E66.812 CLASS 2 SEVERE OBESITY DUE TO EXCESS CALORIES WITH SERIOUS COMORBIDITY AND BODY MASS INDEX (BMI) OF 36.0 TO 36.9 IN ADULT: ICD-10-CM

## 2025-05-12 DIAGNOSIS — E10.65 TYPE 1 DIABETES MELLITUS WITH HYPERGLYCEMIA: Primary | ICD-10-CM

## 2025-05-12 PROCEDURE — 95251 CONT GLUC MNTR ANALYSIS I&R: CPT | Mod: S$GLB,,, | Performed by: NURSE PRACTITIONER

## 2025-05-12 PROCEDURE — 3061F NEG MICROALBUMINURIA REV: CPT | Mod: CPTII,S$GLB,, | Performed by: NURSE PRACTITIONER

## 2025-05-12 PROCEDURE — 3008F BODY MASS INDEX DOCD: CPT | Mod: CPTII,S$GLB,, | Performed by: NURSE PRACTITIONER

## 2025-05-12 PROCEDURE — 3074F SYST BP LT 130 MM HG: CPT | Mod: CPTII,S$GLB,, | Performed by: NURSE PRACTITIONER

## 2025-05-12 PROCEDURE — 1159F MED LIST DOCD IN RCRD: CPT | Mod: CPTII,S$GLB,, | Performed by: NURSE PRACTITIONER

## 2025-05-12 PROCEDURE — 1160F RVW MEDS BY RX/DR IN RCRD: CPT | Mod: CPTII,S$GLB,, | Performed by: NURSE PRACTITIONER

## 2025-05-12 PROCEDURE — 99214 OFFICE O/P EST MOD 30 MIN: CPT | Mod: S$GLB,,, | Performed by: NURSE PRACTITIONER

## 2025-05-12 PROCEDURE — 3078F DIAST BP <80 MM HG: CPT | Mod: CPTII,S$GLB,, | Performed by: NURSE PRACTITIONER

## 2025-05-12 PROCEDURE — 99999 PR PBB SHADOW E&M-EST. PATIENT-LVL IV: CPT | Mod: PBBFAC,,, | Performed by: NURSE PRACTITIONER

## 2025-05-12 PROCEDURE — 3066F NEPHROPATHY DOC TX: CPT | Mod: CPTII,S$GLB,, | Performed by: NURSE PRACTITIONER

## 2025-05-12 PROCEDURE — 3044F HG A1C LEVEL LT 7.0%: CPT | Mod: CPTII,S$GLB,, | Performed by: NURSE PRACTITIONER

## 2025-05-12 RX ORDER — INSULIN DEGLUDEC 200 U/ML
36 INJECTION, SOLUTION SUBCUTANEOUS NIGHTLY
Qty: 1 PEN | Refills: 6 | Status: SHIPPED | OUTPATIENT
Start: 2025-05-12

## 2025-05-12 RX ORDER — INSULIN PMP CART,AUT,G6/7,CNTR
1 EACH SUBCUTANEOUS
Qty: 3 EACH | Refills: 11 | Status: SHIPPED | OUTPATIENT
Start: 2025-05-12

## 2025-05-12 RX ORDER — GLUCAGON 3 MG/1
POWDER NASAL
Qty: 2 EACH | Refills: 1 | Status: SHIPPED | OUTPATIENT
Start: 2025-05-12

## 2025-05-12 RX ORDER — ATORVASTATIN CALCIUM 10 MG/1
10 TABLET, FILM COATED ORAL NIGHTLY
Qty: 90 TABLET | Refills: 3 | Status: SHIPPED | OUTPATIENT
Start: 2025-05-12

## 2025-05-12 RX ORDER — SCOPOLAMINE 1 MG/3D
1 PATCH, EXTENDED RELEASE TRANSDERMAL
Qty: 4 PATCH | Refills: 1 | Status: SHIPPED | OUTPATIENT
Start: 2025-05-12

## 2025-05-12 RX ORDER — ERGOCALCIFEROL 1.25 MG/1
50000 CAPSULE ORAL
Qty: 12 CAPSULE | Refills: 3 | Status: SHIPPED | OUTPATIENT
Start: 2025-05-12

## 2025-05-12 RX ORDER — METFORMIN HYDROCHLORIDE 500 MG/1
500 TABLET, EXTENDED RELEASE ORAL 2 TIMES DAILY WITH MEALS
Qty: 180 TABLET | Refills: 2 | Status: SHIPPED | OUTPATIENT
Start: 2025-05-12

## 2025-05-12 RX ORDER — LEVOTHYROXINE SODIUM 25 UG/1
25 TABLET ORAL
Qty: 102 TABLET | Refills: 2 | Status: SHIPPED | OUTPATIENT
Start: 2025-05-12

## 2025-05-12 RX ORDER — BLOOD-GLUCOSE SENSOR
EACH MISCELLANEOUS
Qty: 3 EACH | Refills: 11 | Status: SHIPPED | OUTPATIENT
Start: 2025-05-12

## 2025-05-12 RX ORDER — BLOOD-GLUCOSE TRANSMITTER
EACH MISCELLANEOUS
Qty: 1 EACH | Refills: 4 | Status: SHIPPED | OUTPATIENT
Start: 2025-05-12

## 2025-05-12 RX ORDER — INSULIN ASPART INJECTION 100 [IU]/ML
INJECTION, SOLUTION SUBCUTANEOUS
Qty: 40 ML | Refills: 11 | Status: SHIPPED | OUTPATIENT
Start: 2025-05-12

## 2025-05-12 NOTE — ASSESSMENT & PLAN NOTE
A1c well controlled   Readings running a little above goal on Omnipod and Dexocm download   Discussed tightening insulin to carbohydrate ratio with lunch----deferred at this time---will let me know if the readings are staying high      Tried Ozempic in the past but insurance denied because she is T1DM -- she deferred retrying at this visit         -- Medication Changes:      Continue Metformin 500 mg BID --has been missing doses---he is going to work on taking this consistently      Continue Omnipod 5   With Fiasp insulin     Insulin pump settings: stay in auto mode 100%! Discussed troubleshooting the pump   Hit the use sensor button more often to correct the hyperglycemia   No pump setting changes today    Basal:  Continue current basal settings   MN-3AM:   2 units/hr  3AM- 8AM: 1.5 units/hr   8AM- MN: 1.4 units/hr     ICR consider tightening her insulin to carbohydrate ratio at lunch if she continues to have postprandial excursions  MN- 5 PM: 1:5  5PM- MN: 1:4--    ISF:   MN- 3AM:1:25-  3AM-8AM: 1:25--   8AM- 5PM: 1:20  5PM- MN: 1:17     Target- 100-120     IOB: 3 hours       She will let us know when she gets a new phone and is able to get the Omnipod 5 dasha on her phone       -- Reviewed goals of therapy are to get the best control we can without hypoglycemia.  -- Reviewed patient's current insulin regimen. Clarified proper insulin dose and timing in relation to meals, etc. Insulin injection sites and proper rotation instructed.    -- Advised frequent self blood glucose monitoring.  Patient encouraged to document glucose results and bring them to every clinic visit. Continue to use Dexcom g6 -- supplies from pharmacy   -- Hypoglycemia precautions discussed. Instructed on precautions before driving.    -- Call for Bg repeatedly < 70 or > 180.   -- Close adherence to lifestyle changes recommended.   -- Periodic follow ups for eye evaluations, foot care and dental care suggested.  -- follow up with diabetes  education as needed       Patient has diabetes mellitus and manages diabetes with intensive insulin regimen and uses prandial and basal insulin daily-- via pump   Patient requires a therapeutic CGM and is willing to use therapeutic CGM for the necessary frequent adjustments of insulin therapy.  I have completed an in-person visit during the previous 6 months and will continue to have in-person visits every 6 months to assess adherence to their CGM regimen and diabetes treatment plan.  Due to COVID pandemic and need for remote monitoring this tool is medically necessary

## 2025-05-12 NOTE — ASSESSMENT & PLAN NOTE
Reviewed hypoglycemia management: Treat with 1/2 glass of juice, 1/2 can regular coke, or 4 glucose tablets.   Monitor and repeat treatment every 15 minutes until BG is >70   Then have a snack, which includes a complex carbohydrate and protein.    - RX for new  Baqsimi     -better when in auto mode     Reviewed proper treatment and what to do following hypoglycemia before a meal

## 2025-05-12 NOTE — ASSESSMENT & PLAN NOTE
On statin per ADA recommendations  LDL goal < 100. LDL slightly above goal. LFTs WNL. Continue statin.

## 2025-05-12 NOTE — ASSESSMENT & PLAN NOTE
Body mass index is 36.28 kg/m².  Increases insulin resistance.   Discussed DM diet and exercise.

## 2025-05-12 NOTE — PROGRESS NOTES
CC:  Chief Complaint   Patient presents with    Diabetes Mellitus       HPI: Etta Bill is a 35 y.o. female presents for a follow up visit today for the management of T1DM.      She was diagnosed with Type 1 diabetes in September 2020 when she went into DKA and was hospitalized. She presented with fatigue, polyuria, polydipsia, nausea and vomiting. BG was 330 on chemistry panel. She was discharged home on insulin therapy ( [facility]).   9/30/2020--LYDIA and anti islet cell antibodies--both positive.  C-peptide low at 0.53 with a fasting glucose of 243--confirms the diagnosis of type 1 diabetes      Family hx of diabetes: grandmother   Hospitalized for diabetes: DKA at diagnosis.     No personal or FH of thyroid cancer or personal of pancreatic cancer or pancreatitis.       She started the insulin pump ( Tandem  T-Slim) on 2/15/2021.  Started Omnipod 5 in April 2024   She still feels confident with her carbohydrate counting          Our last visit was in November of 2024  It was a virtual visit  At that visit we continued metformin 500 b.i.d.  Continue Omnipod 5 with Fiasp insulin   Continued her settings and just reviewed that she needed to hit the use sensor button  Discussed pump optimization  She wanted to get the Omnipod 5 dasha on her phone  Her recent A1c is 6.4%  See attached downloads    Unfortunately her phone does not have enough storage for her to get the dasha on her phone so she has still been using the Omnipod 5                               DIABETES COMPLICATIONS: none      Diabetes Management Status    ASA:  No    Statin: taking Lipitor 10 mg nightly   ACE/ARB: Not taking     Screening or Prevention Patient's value Goal Complete/Controlled?   HgA1C Testing and Control   Lab Results   Component Value Date    HGBA1C 6.4 (H) 05/03/2025      Annually/Less than 8% No   Lipid profile : 05/03/2025 Annually Yes   LDL control Lab Results   Component Value Date    LDLCALC 101.8 05/03/2025     Annually/Less than 100 mg/dl  No   Nephropathy screening Lab Results   Component Value Date    LABMICR 9.0 05/03/2025     Lab Results   Component Value Date    PROTEINUA Negative 09/12/2020    Annually Yes   Blood pressure BP Readings from Last 1 Encounters:   05/12/25 104/71    Less than 140/90 Yes   Dilated retinal exam : 12/10/2020 Annually No   Foot exam   : 05/12/2025 Annually No       CURRENT A1C:    Hemoglobin A1C   Date Value Ref Range Status   11/02/2024 6.3 (H) 4.0 - 5.6 % Final     Comment:     ADA Screening Guidelines:  5.7-6.4%  Consistent with prediabetes  >or=6.5%  Consistent with diabetes    High levels of fetal hemoglobin interfere with the HbA1C  assay. Heterozygous hemoglobin variants (HbS, HgC, etc)do  not significantly interfere with this assay.   However, presence of multiple variants may affect accuracy.     07/03/2024 6.3 (H) 4.0 - 5.6 % Final     Comment:     ADA Screening Guidelines:  5.7-6.4%  Consistent with prediabetes  >or=6.5%  Consistent with diabetes    High levels of fetal hemoglobin interfere with the HbA1C  assay. Heterozygous hemoglobin variants (HbS, HgC, etc)do  not significantly interfere with this assay.   However, presence of multiple variants may affect accuracy.     03/30/2024 6.4 (H) 4.0 - 5.6 % Final     Comment:     ADA Screening Guidelines:  5.7-6.4%  Consistent with prediabetes  >or=6.5%  Consistent with diabetes    High levels of fetal hemoglobin interfere with the HbA1C  assay. Heterozygous hemoglobin variants (HbS, HgC, etc)do  not significantly interfere with this assay.   However, presence of multiple variants may affect accuracy.       Hemoglobin A1c   Date Value Ref Range Status   05/03/2025 6.4 (H) 4.0 - 5.6 % Final     Comment:     ADA Screening Guidelines:  5.7-6.4%  Consistent with prediabetes  >=6.5%  Consistent with diabetes    High levels of fetal hemoglobin interfere with the HbA1C  assay. Heterozygous hemoglobin variants (HbS, HgC, etc)do  not  significantly interfere with this assay.   However, presence of multiple variants may affect accuracy.       GOAL A1C: 6.5% without hypoglycemia         DM MEDICATIONS USED IN THE PAST: Levemir   Novolog   Tresiba   Fiasp   Tandem TSLim   Metformin   Omnipod 5   Fiasp           CURRENT DIABETES MEDICATIONS: Metformin  mg BID -- missing doses -- just forgets     Insulin Pump: Omnipod 5 with Fiasp insulin     Basal:  Continue current basal settings   MN-3AM:   2 units/hr  3AM- 8AM: 1.5 units/hr   8AM- MN: 1.4 units/hr      ICR   MN- 5 PM: 1:5  5PM- MN: 1:4--     ISF:   MN- 3AM:1:25-  3AM-8AM: 1:25--   8AM- 5PM: 1:20  5PM- MN: 1:17      Target- 100-120      IOB: 3 hours           Pump site change: q 2-3 days   Cartridge change: q 2-3 days  Insulin TDD:   units/day     Basal  %   Bolus %  100% in auto mode   3% automated limited      Back up Lantus/Levemir: back up Tresiba at home       Patient's pump was downloaded in clinic today and reviewed with patient.   Please see attached documents for pump download.     Pump supplies come from: pharmacy         BLOOD GLUCOSE MONITORING:    Sensor type: Dexcom G6   Average BG readin  Estimated A1c:  7.1%  Time in range:  72%  Site change:q10 days    Two weeks prior average blood sugar 161 in in target range 72% of the time with an estimated A1c of 7.2%      Dexcom supplies from- [facility] [employer]    Sensor was downloaded in clinic today and reviewed with patient.   Please see attached document for download.         HYPOGLYCEMIA: 3% low    0%very low   + possible hypoglycemia unawareness on the past before she started the Dexcom.       MEALS: eating 3 meals per day    She is CHO counting   She feels confident with CHO counting   Snack: crackers and cheese and baby bell cheese or almonds and other nuts   Drinks: water   No sugary beverages          CURRENT EXERCISE:  No        Review of Systems  Review of Systems   Constitutional:  Negative for appetite change, fatigue and  unexpected weight change.   HENT:  Negative for trouble swallowing.    Eyes:  Negative for visual disturbance.   Respiratory:  Negative for shortness of breath.    Cardiovascular:  Negative for chest pain.   Gastrointestinal:  Negative for abdominal distention, abdominal pain, constipation and nausea.   Endocrine: Negative for polydipsia, polyphagia and polyuria.   Genitourinary:         No Nocturia    Skin:  Negative for wound.   Neurological:  Negative for numbness.         Physical Exam   Physical Exam  Vitals and nursing note reviewed.   Constitutional:       General: She is not in acute distress.     Appearance: She is well-developed. She is obese. She is not ill-appearing.   HENT:      Head: Normocephalic and atraumatic.      Right Ear: External ear normal.      Left Ear: External ear normal.      Nose: Nose normal.   Neck:      Thyroid: No thyromegaly.      Trachea: No tracheal deviation.   Cardiovascular:      Rate and Rhythm: Normal rate and regular rhythm.      Heart sounds: No murmur heard.  Pulmonary:      Effort: Pulmonary effort is normal. No respiratory distress.      Breath sounds: Normal breath sounds.   Abdominal:      Palpations: Abdomen is soft.      Tenderness: There is no abdominal tenderness.      Hernia: No hernia is present.   Musculoskeletal:      Cervical back: Normal range of motion and neck supple.   Skin:     General: Skin is warm and dry.      Capillary Refill: Capillary refill takes less than 2 seconds.      Findings: No rash.      Comments: Injection sites are normal appearing. No lipo hypertropthy or atrophy     Neurological:      General: No focal deficit present.      Mental Status: She is alert and oriented to person, place, and time.      Cranial Nerves: No cranial nerve deficit.   Psychiatric:         Mood and Affect: Mood normal.         Behavior: Behavior normal.         Thought Content: Thought content normal.         Judgment: Judgment normal.             FOOT EXAMINATION:  Appropriate footwear         Protective Sensation (w/ 10 gram monofilament):  Right: Intact  Left: Intact    Visual Inspection:  Normal -  Bilateral and Nails Intact - without Evidence of Foot Deformity- Bilateral    Pedal Pulses:   Right: Present  Left: Present    Posterior Tibialis Pulses:   Right:Present  Left: Present          Lab Results   Component Value Date    TSH 2.172 05/03/2025             Type 1 diabetes mellitus with hyperglycemia  A1c well controlled   Readings running a little above goal on Omnipod and Dexocm download   Discussed tightening insulin to carbohydrate ratio with lunch----deferred at this time---will let me know if the readings are staying high      Tried Ozempic in the past but insurance denied because she is T1DM -- she deferred retrying at this visit         -- Medication Changes:      Continue Metformin 500 mg BID --has been missing doses---he is going to work on taking this consistently      Continue Omnipod 5   With Fiasp insulin     Insulin pump settings: stay in auto mode 100%! Discussed troubleshooting the pump   Hit the use sensor button more often to correct the hyperglycemia   No pump setting changes today    Basal:  Continue current basal settings   MN-3AM:   2 units/hr  3AM- 8AM: 1.5 units/hr   8AM- MN: 1.4 units/hr     ICR consider tightening her insulin to carbohydrate ratio at lunch if she continues to have postprandial excursions  MN- 5 PM: 1:5  5PM- MN: 1:4--    ISF:   MN- 3AM:1:25-  3AM-8AM: 1:25--   8AM- 5PM: 1:20  5PM- MN: 1:17     Target- 100-120     IOB: 3 hours       She will let us know when she gets a new phone and is able to get the Omnipod 5 dasha on her phone       -- Reviewed goals of therapy are to get the best control we can without hypoglycemia.  -- Reviewed patient's current insulin regimen. Clarified proper insulin dose and timing in relation to meals, etc. Insulin injection sites and proper rotation instructed.    -- Advised frequent self blood glucose  monitoring.  Patient encouraged to document glucose results and bring them to every clinic visit. Continue to use Dexcom g6 -- supplies from pharmacy   -- Hypoglycemia precautions discussed. Instructed on precautions before driving.    -- Call for Bg repeatedly < 70 or > 180.   -- Close adherence to lifestyle changes recommended.   -- Periodic follow ups for eye evaluations, foot care and dental care suggested.  -- follow up with diabetes education as needed       Patient has diabetes mellitus and manages diabetes with intensive insulin regimen and uses prandial and basal insulin daily-- via pump   Patient requires a therapeutic CGM and is willing to use therapeutic CGM for the necessary frequent adjustments of insulin therapy.  I have completed an in-person visit during the previous 6 months and will continue to have in-person visits every 6 months to assess adherence to their CGM regimen and diabetes treatment plan.  Due to COVID pandemic and need for remote monitoring this tool is medically necessary        Type 1 diabetes mellitus with hypoglycemia and without coma  Reviewed hypoglycemia management: Treat with 1/2 glass of juice, 1/2 can regular coke, or 4 glucose tablets.   Monitor and repeat treatment every 15 minutes until BG is >70   Then have a snack, which includes a complex carbohydrate and protein.    - RX for new  Baqsimi     -better when in auto mode     Reviewed proper treatment and what to do following hypoglycemia before a meal     Class 2 severe obesity due to excess calories with serious comorbidity and body mass index (BMI) of 36.0 to 36.9 in adult  Body mass index is 36.28 kg/m².  Increases insulin resistance.   Discussed DM diet and exercise.       Vitamin D deficiency  On Ergo     Vit D, 25-Hydroxy   Date Value Ref Range Status   11/02/2024 39 30 - 96 ng/mL Final     Comment:     Vitamin D deficiency.........<10 ng/mL                              Vitamin D insufficiency......10-29 ng/mL        Vitamin D sufficiency........> or equal to 30 ng/mL  Vitamin D toxicity............>100 ng/mL             Hypothyroidism due to Hashimoto's thyroiditis  TPO +   Confirms hypothyroidism     On levothyroxine 25 mcg daily and 1.5 tablets on Saturday and Wednesdays    reinforced take by itself on empty stomach, first thing in the morning and wait at least 30-60 minutes to eat, drink, or take other medications.    TSH WNL -- at goal     Insulin pump in place  See above     Dyslipidemia, goal LDL below 100  On statin per ADA recommendations  LDL goal < 100. LDL slightly above goal. LFTs WNL. Continue statin.               Pump backup plan    If the insulin pump is non functional and discontinued for anticipated more than 20 hours, please give daily injections of:   Long acting insulin Tresiba 36 units daily   Short acting insulin NOvolog  for meals according to carb ratios and sensitivity factor in the pump. Insulin to carbohdrate ratio 1:5     When the insulin pump is restarted, do not restart basal rates until at least 22 hours after the last long acting insulin injection. You can set a 0% temporary basal setting that will last until this time and use your pump to bolus for meals and correction.     For any technical insulin pump issues, please contact the insulin pump company; the toll free number is printed on the label on the back of the insulin pump.       If your sugar is running high for a few hours and does not respond to two correction doses from the insulin pump, it may mean that you have a bad pump site and the site should be changed           I spent a total of 30 minutes on the day of the visit.This includes face to face time and non-face to face time preparing to see the patient (eg, review of tests), obtaining and/or reviewing separately obtained history, documenting clinical information in the electronic or other health record, independently interpreting results and communicating results to the  patient/family/caregiver, or care coordinator.          Follow up in about 6 months (around 11/12/2025).  Follow up with me in 6 months with labs prior   Schedule with Dr. Scarlett Alfaro - at [facility]           Orders Placed This Encounter   Procedures    Hemoglobin A1C     Standing Status:   Future     Expected Date:   11/12/2025     Expiration Date:   11/12/2026    TSH     Standing Status:   Future     Expected Date:   11/12/2025     Expiration Date:   11/12/2026    Lipid Panel     Standing Status:   Future     Expected Date:   11/12/2025     Expiration Date:   11/12/2026    Basic Metabolic Panel     Standing Status:   Future     Expected Date:   11/12/2025     Expiration Date:   11/12/2026    Vitamin D     Standing Status:   Future     Expected Date:   11/12/2025     Expiration Date:   11/12/2026         Recommendations were discussed with the patient in detail  The patient verbalized understanding and agrees with the plan outlined as above.     This note was partly generated with [employer] voice recognition software. I apologize for any possible typographical errors.